# Patient Record
Sex: FEMALE | Race: WHITE | NOT HISPANIC OR LATINO | Employment: FULL TIME | URBAN - METROPOLITAN AREA
[De-identification: names, ages, dates, MRNs, and addresses within clinical notes are randomized per-mention and may not be internally consistent; named-entity substitution may affect disease eponyms.]

---

## 2017-01-24 RX ORDER — OSELTAMIVIR PHOSPHATE 75 MG/1
CAPSULE ORAL
Qty: 10 CAPSULE | Refills: 0 | Status: SHIPPED | OUTPATIENT
Start: 2017-01-24 | End: 2017-04-07

## 2017-01-24 NOTE — PROGRESS NOTES
Patient's here with her daughter  Her daughter has temperature 101 today, positive influenza A  Patient with long history of tobacco abuse worried she may have COPD?  Prophylaxis Tamiflu 75 mg daily ×10 days  If fever chest pain shortness of breath seek urgent treatment    She'll return office soon to discuss chronic dyspnea    Thank You,      James Epley,  NP

## 2017-04-07 ENCOUNTER — OFFICE VISIT (OUTPATIENT)
Dept: FAMILY MEDICINE CLINIC | Facility: CLINIC | Age: 57
End: 2017-04-07

## 2017-04-07 VITALS
HEIGHT: 67 IN | BODY MASS INDEX: 29.98 KG/M2 | OXYGEN SATURATION: 94 % | SYSTOLIC BLOOD PRESSURE: 140 MMHG | HEART RATE: 102 BPM | DIASTOLIC BLOOD PRESSURE: 78 MMHG | WEIGHT: 191 LBS

## 2017-04-07 DIAGNOSIS — R03.0 ELEVATED BLOOD PRESSURE (NOT HYPERTENSION): ICD-10-CM

## 2017-04-07 DIAGNOSIS — F41.9 ANXIETY: ICD-10-CM

## 2017-04-07 DIAGNOSIS — Z13.9 SCREENING: ICD-10-CM

## 2017-04-07 DIAGNOSIS — Z12.11 COLON CANCER SCREENING: ICD-10-CM

## 2017-04-07 DIAGNOSIS — E55.9 VITAMIN D DEFICIENCY: ICD-10-CM

## 2017-04-07 DIAGNOSIS — F41.8 MIXED ANXIETY DEPRESSIVE DISORDER: Primary | ICD-10-CM

## 2017-04-07 PROBLEM — E78.5 HYPERLIPIDEMIA: Status: ACTIVE | Noted: 2017-04-07

## 2017-04-07 PROCEDURE — 99213 OFFICE O/P EST LOW 20 MIN: CPT | Performed by: NURSE PRACTITIONER

## 2017-04-07 RX ORDER — ALPRAZOLAM 1 MG/1
TABLET ORAL
Qty: 30 TABLET | Refills: 0 | Status: SHIPPED | OUTPATIENT
Start: 2017-04-07 | End: 2017-06-26 | Stop reason: SDUPTHER

## 2017-04-07 RX ORDER — DULOXETIN HYDROCHLORIDE 60 MG/1
60 CAPSULE, DELAYED RELEASE ORAL 2 TIMES DAILY
Qty: 180 CAPSULE | Refills: 1 | Status: SHIPPED | OUTPATIENT
Start: 2017-04-07 | End: 2017-09-22 | Stop reason: SDUPTHER

## 2017-04-07 RX ORDER — MIRTAZAPINE 15 MG/1
15 TABLET, FILM COATED ORAL NIGHTLY
Qty: 90 TABLET | Refills: 1 | Status: SHIPPED | OUTPATIENT
Start: 2017-04-07 | End: 2017-09-12 | Stop reason: SDUPTHER

## 2017-04-07 NOTE — PATIENT INSTRUCTIONS
Discharge instructions    Recheck blood pressure weekly ×3 weeks  Call me the numbers    Consider trial decreasing generic Remeron one half tablet at bedtime    Continue appropriate use as needed Xanax with caution    Recheck in 6 months        Return office fasting labs    Recommend a statin, will await your cholesterol first    Thank You,      James Epley,  NP

## 2017-04-07 NOTE — PROGRESS NOTES
Subjective   Ro Perez is a 57 y.o. female.     HPI Comments: Follow-up  Depression    Stable  Cymbalta helps could be better but wants to continue Cymbalta helps her arthritis hips greatly    Hip arthritis controlled doesn't need anything else duloxetine very effective    Remeron has taken for years  Help sleep  She does get up and eat at night  I did discuss risk benefit Remeron  May increase appetite  Discussed discontinuing it slowly  She's been out for a week with poor sleep  Discussed dependency as well as sleep evaluation treatment options    Does not want colonoscopy I discussed risk and benefit  She will reconsider I referred her    Quite a bit of stress with her daughter who has depression and behavior problems  Otherwise doing well    Rarely takes Xanax  The last prescription lasted her 12 months  She is compliant  Appropriate use  Requested updated controlled substance agreement and Raghav  UDS appropriate  No need for confirmation as she rarely takes it would be impossible to obtain accurate results      Mammogram up-to-date    DEXA scan 2015 normal    Blood pressure 140/78 she'll recheck    10 year cardiovascular risk over 10% we discussed as well as statin       The following portions of the patient's history were reviewed and updated as appropriate: allergies, current medications, past family history, past medical history, past surgical history and problem list.    Review of Systems   Musculoskeletal: Positive for arthralgias.   Psychiatric/Behavioral: The patient is nervous/anxious.    All other systems reviewed and are negative.      Objective   Physical Exam   Constitutional: She is oriented to person, place, and time. She appears well-developed and well-nourished.   HENT:   Head: Normocephalic.   Nose: Nose normal.   Mouth/Throat: Oropharynx is clear and moist.   Tm clear laci no mass canal patent without d/c   Eyes: Conjunctivae are normal. Pupils are equal, round, and reactive to light. No  scleral icterus.   Neck: Neck supple. No JVD present. No thyromegaly present.   Cardiovascular: Normal rate, regular rhythm and normal heart sounds.  Exam reveals no gallop and no friction rub.    No murmur heard.  Pulmonary/Chest: Effort normal and breath sounds normal. No stridor. No respiratory distress. She has no wheezes. She has no rales.   Abdominal: Soft. Bowel sounds are normal. She exhibits no distension. There is no tenderness.   No hepatosplenomegaly, no ascites,   Musculoskeletal: She exhibits no edema or tenderness.   Lymphadenopathy:     She has no cervical adenopathy.   Neurological: She is alert and oriented to person, place, and time. She has normal reflexes.   Skin: Skin is warm and dry. No rash noted. No erythema.   Psychiatric: She has a normal mood and affect. Her behavior is normal. Judgment and thought content normal.   Vitals reviewed.      Assessment/Plan   Ro was seen today for follow-up and med refill.    Diagnoses and all orders for this visit:    Mixed anxiety depressive disorder  -     Comprehensive Metabolic Panel  -     CBC & Differential  -     Lipid Panel With LDL / HDL Ratio  -     TSH Rfx On Abnormal To Free T4  -     Vitamin D 25 Hydroxy  -     Urinalysis With / Microscopic If Indicated  -     Occult Blood, Fecal By Immunoassay    Elevated blood pressure (not hypertension)  -     Comprehensive Metabolic Panel  -     CBC & Differential  -     Lipid Panel With LDL / HDL Ratio  -     TSH Rfx On Abnormal To Free T4  -     Vitamin D 25 Hydroxy  -     Urinalysis With / Microscopic If Indicated  -     Occult Blood, Fecal By Immunoassay    Vitamin D deficiency  -     Comprehensive Metabolic Panel  -     CBC & Differential  -     Lipid Panel With LDL / HDL Ratio  -     TSH Rfx On Abnormal To Free T4  -     Vitamin D 25 Hydroxy  -     Urinalysis With / Microscopic If Indicated  -     Occult Blood, Fecal By Immunoassay    Screening  -     Comprehensive Metabolic Panel  -     CBC &  Differential  -     Lipid Panel With LDL / HDL Ratio  -     TSH Rfx On Abnormal To Free T4  -     Vitamin D 25 Hydroxy  -     Urinalysis With / Microscopic If Indicated  -     Occult Blood, Fecal By Immunoassay  -     Vascular Screening (Bundle) CAR    Anxiety  -     Comprehensive Metabolic Panel  -     CBC & Differential  -     Lipid Panel With LDL / HDL Ratio  -     TSH Rfx On Abnormal To Free T4  -     Vitamin D 25 Hydroxy  -     Urinalysis With / Microscopic If Indicated  -     Occult Blood, Fecal By Immunoassay    Colon cancer screening  -     Comprehensive Metabolic Panel  -     CBC & Differential  -     Lipid Panel With LDL / HDL Ratio  -     TSH Rfx On Abnormal To Free T4  -     Vitamin D 25 Hydroxy  -     Urinalysis With / Microscopic If Indicated  -     Occult Blood, Fecal By Immunoassay  -     Ambulatory Referral For Screening Colonoscopy    Other orders  -     ALPRAZolam (XANAX) 1 MG tablet; One half to one tablet daily as needed for anxiety  -     DULoxetine (CYMBALTA) 60 MG capsule; Take 1 capsule by mouth 2 (Two) Times a Day.  -     mirtazapine (REMERON) 15 MG tablet; Take 1 tablet by mouth Every Night.                  Discussed Remeron  Suggest trying to decrease one half tablet for couple months and possibly wean off    Proper use Xanax  Recheck 6 months  Fasting labs  Statin will be recommended discussed risk benefit as well as cardiovascular risk greater than 10% 10 year estimated  Must stop smoking    Recommend vascular screenings    Discussed aspirin next visit as well    Recommend more frequent follow-ups

## 2017-06-20 ENCOUNTER — TRANSCRIBE ORDERS (OUTPATIENT)
Dept: ADMINISTRATIVE | Facility: HOSPITAL | Age: 57
End: 2017-06-20

## 2017-06-20 DIAGNOSIS — Z12.31 VISIT FOR SCREENING MAMMOGRAM: Primary | ICD-10-CM

## 2017-06-29 ENCOUNTER — TELEPHONE (OUTPATIENT)
Dept: FAMILY MEDICINE CLINIC | Facility: CLINIC | Age: 57
End: 2017-06-29

## 2017-06-29 NOTE — TELEPHONE ENCOUNTER
----- Message from Mary Lou Myers MA sent at 6/29/2017  3:56 PM EDT -----  I did not refill this patient's medication, i looked through her chart some more and saw that it took 2 month rather than 12 months on her last prescription. But i will refill now that you have given me the ok

## 2017-06-29 NOTE — TELEPHONE ENCOUNTER
Okay to refill   Please make sure controlled substance agreement in her chart  Please updated Raghav    As well as Xanax is a controlled substance always will need approval from  me prior  Even if she is appropriate.   Is a little confusing  I don't believe these are included on our  Protocol refill  We can clarify when I come back from a trip    Thanks!

## 2017-06-29 NOTE — TELEPHONE ENCOUNTER
----- Message from Mary Lou Myers MA sent at 6/26/2017  2:40 PM EDT -----  A request for refill on patient's XANAX and i read through last office note and it stated no need for confirmation for refills on this medication due to the patient not using it regularly. Sending in refill today 6/26/2017 # 30 no refills

## 2017-06-30 RX ORDER — ALPRAZOLAM 1 MG/1
TABLET ORAL
Qty: 30 TABLET | Refills: 0 | OUTPATIENT
Start: 2017-06-30 | End: 2017-09-15 | Stop reason: SDUPTHER

## 2017-07-14 ENCOUNTER — HOSPITAL ENCOUNTER (OUTPATIENT)
Dept: CARDIOLOGY | Facility: HOSPITAL | Age: 57
Discharge: HOME | End: 2017-07-14

## 2017-07-14 ENCOUNTER — HOSPITAL ENCOUNTER (OUTPATIENT)
Dept: MAMMOGRAPHY | Facility: HOSPITAL | Age: 57
Discharge: HOME | End: 2017-07-14
Admitting: NURSE PRACTITIONER

## 2017-07-14 VITALS
HEART RATE: 90 BPM | BODY MASS INDEX: 31.23 KG/M2 | SYSTOLIC BLOOD PRESSURE: 146 MMHG | DIASTOLIC BLOOD PRESSURE: 78 MMHG | HEIGHT: 67 IN | WEIGHT: 199 LBS

## 2017-07-14 DIAGNOSIS — Z12.31 VISIT FOR SCREENING MAMMOGRAM: ICD-10-CM

## 2017-07-14 LAB
BH CV ECHO MEAS - DIST AO DIAM: 1.58 CM
BH CV VAS BP LEFT ARM: NORMAL MMHG
BH CV VAS BP RIGHT ARM: NORMAL MMHG
BH CV XLRA MEAS - MID AO DIAM: 1.81 CM
BH CV XLRA MEAS - PAD LEFT ABI DP: 1.02
BH CV XLRA MEAS - PAD LEFT ABI PT: 1.06
BH CV XLRA MEAS - PAD LEFT ARM: 145 MMHG
BH CV XLRA MEAS - PAD LEFT LEG DP: 150 MMHG
BH CV XLRA MEAS - PAD LEFT LEG PT: 156 MMHG
BH CV XLRA MEAS - PAD RIGHT ABI DP: 1.01
BH CV XLRA MEAS - PAD RIGHT ABI PT: 1.02
BH CV XLRA MEAS - PAD RIGHT ARM: 146 MMHG
BH CV XLRA MEAS - PAD RIGHT LEG DP: 148 MMHG
BH CV XLRA MEAS - PAD RIGHT LEG PT: 150 MMHG
BH CV XLRA MEAS - PROX AO DIAM: 1.85 CM
BH CV XLRA MEAS LEFT ICA/CCA RATIO: 1.42
BH CV XLRA MEAS LEFT MID CCA PSV: NORMAL CM/SEC
BH CV XLRA MEAS LEFT MID ICA PSV: NORMAL CM/SEC
BH CV XLRA MEAS LEFT PROX ECA PSV: NORMAL CM/SEC
BH CV XLRA MEAS RIGHT ICA/CCA RATIO: 0.96
BH CV XLRA MEAS RIGHT MID CCA PSV: NORMAL CM/SEC
BH CV XLRA MEAS RIGHT MID ICA PSV: NORMAL CM/SEC
BH CV XLRA MEAS RIGHT PROX ECA PSV: NORMAL CM/SEC

## 2017-07-14 PROCEDURE — 93799 UNLISTED CV SVC/PROCEDURE: CPT

## 2017-07-14 PROCEDURE — G0202 SCR MAMMO BI INCL CAD: HCPCS

## 2017-09-14 RX ORDER — MIRTAZAPINE 15 MG/1
TABLET, FILM COATED ORAL
Qty: 90 TABLET | Refills: 0 | Status: SHIPPED | OUTPATIENT
Start: 2017-09-14 | End: 2017-11-26 | Stop reason: SDUPTHER

## 2017-09-21 RX ORDER — ALPRAZOLAM 1 MG/1
TABLET ORAL
Qty: 30 TABLET | Refills: 0 | OUTPATIENT
Start: 2017-09-21 | End: 2018-04-18

## 2017-09-22 RX ORDER — DULOXETIN HYDROCHLORIDE 60 MG/1
CAPSULE, DELAYED RELEASE ORAL
Qty: 180 CAPSULE | Refills: 1 | Status: SHIPPED | OUTPATIENT
Start: 2017-09-22 | End: 2018-04-08 | Stop reason: SDUPTHER

## 2017-11-27 RX ORDER — MIRTAZAPINE 15 MG/1
TABLET, FILM COATED ORAL
Qty: 90 TABLET | Refills: 0 | Status: SHIPPED | OUTPATIENT
Start: 2017-11-27 | End: 2018-02-19 | Stop reason: SDUPTHER

## 2018-02-19 RX ORDER — MIRTAZAPINE 15 MG/1
TABLET, FILM COATED ORAL
Qty: 90 TABLET | Refills: 0 | Status: SHIPPED | OUTPATIENT
Start: 2018-02-19 | End: 2018-04-18 | Stop reason: SDUPTHER

## 2018-04-09 RX ORDER — DULOXETIN HYDROCHLORIDE 60 MG/1
CAPSULE, DELAYED RELEASE ORAL
Qty: 180 CAPSULE | Refills: 1 | Status: SHIPPED | OUTPATIENT
Start: 2018-04-09 | End: 2018-04-18 | Stop reason: SDUPTHER

## 2018-04-18 ENCOUNTER — OFFICE VISIT (OUTPATIENT)
Dept: FAMILY MEDICINE CLINIC | Facility: CLINIC | Age: 58
End: 2018-04-18

## 2018-04-18 ENCOUNTER — APPOINTMENT (OUTPATIENT)
Dept: LAB | Facility: HOSPITAL | Age: 58
End: 2018-04-18

## 2018-04-18 ENCOUNTER — HOSPITAL ENCOUNTER (OUTPATIENT)
Dept: GENERAL RADIOLOGY | Facility: HOSPITAL | Age: 58
Discharge: HOME | End: 2018-04-18
Admitting: NURSE PRACTITIONER

## 2018-04-18 VITALS
HEART RATE: 111 BPM | TEMPERATURE: 98.2 F | SYSTOLIC BLOOD PRESSURE: 142 MMHG | WEIGHT: 202.1 LBS | DIASTOLIC BLOOD PRESSURE: 74 MMHG | BODY MASS INDEX: 32.13 KG/M2 | OXYGEN SATURATION: 93 %

## 2018-04-18 DIAGNOSIS — M54.50 CHRONIC LEFT-SIDED LOW BACK PAIN WITHOUT SCIATICA: ICD-10-CM

## 2018-04-18 DIAGNOSIS — M16.12 PRIMARY OSTEOARTHRITIS OF LEFT HIP: ICD-10-CM

## 2018-04-18 DIAGNOSIS — G89.29 CHRONIC LEFT-SIDED LOW BACK PAIN WITHOUT SCIATICA: ICD-10-CM

## 2018-04-18 DIAGNOSIS — Z79.899 HIGH RISK MEDICATION USE: ICD-10-CM

## 2018-04-18 DIAGNOSIS — R06.09 DYSPNEA ON EXERTION: ICD-10-CM

## 2018-04-18 DIAGNOSIS — F41.8 MIXED ANXIETY DEPRESSIVE DISORDER: Primary | ICD-10-CM

## 2018-04-18 LAB
ALBUMIN SERPL-MCNC: 4.4 G/DL (ref 3.5–5.2)
ALBUMIN/GLOB SERPL: 1.4 G/DL
ALP SERPL-CCNC: 118 U/L (ref 39–117)
ALT SERPL W P-5'-P-CCNC: 17 U/L (ref 1–33)
ANION GAP SERPL CALCULATED.3IONS-SCNC: 13.3 MMOL/L
AST SERPL-CCNC: 17 U/L (ref 1–32)
BASOPHILS # BLD AUTO: 0.09 10*3/MM3 (ref 0–0.2)
BASOPHILS NFR BLD AUTO: 0.8 % (ref 0–1.5)
BILIRUB SERPL-MCNC: 0.2 MG/DL (ref 0.1–1.2)
BUN BLD-MCNC: 11 MG/DL (ref 6–20)
BUN/CREAT SERPL: 13.1 (ref 7–25)
CALCIUM SPEC-SCNC: 9.4 MG/DL (ref 8.6–10.5)
CHLORIDE SERPL-SCNC: 101 MMOL/L (ref 98–107)
CO2 SERPL-SCNC: 26.7 MMOL/L (ref 22–29)
CREAT BLD-MCNC: 0.84 MG/DL (ref 0.57–1)
DEPRECATED RDW RBC AUTO: 45.7 FL (ref 37–54)
EOSINOPHIL # BLD AUTO: 0.15 10*3/MM3 (ref 0–0.7)
EOSINOPHIL NFR BLD AUTO: 1.3 % (ref 0.3–6.2)
ERYTHROCYTE [DISTWIDTH] IN BLOOD BY AUTOMATED COUNT: 13.8 % (ref 11.7–13)
GFR SERPL CREATININE-BSD FRML MDRD: 69 ML/MIN/1.73
GLOBULIN UR ELPH-MCNC: 3.2 GM/DL
GLUCOSE BLD-MCNC: 78 MG/DL (ref 65–99)
HCT VFR BLD AUTO: 44.7 % (ref 35.6–45.5)
HGB BLD-MCNC: 14.5 G/DL (ref 11.9–15.5)
IMM GRANULOCYTES # BLD: 0.06 10*3/MM3 (ref 0–0.03)
IMM GRANULOCYTES NFR BLD: 0.5 % (ref 0–0.5)
LYMPHOCYTES # BLD AUTO: 2.78 10*3/MM3 (ref 0.9–4.8)
LYMPHOCYTES NFR BLD AUTO: 24.8 % (ref 19.6–45.3)
MCH RBC QN AUTO: 29.3 PG (ref 26.9–32)
MCHC RBC AUTO-ENTMCNC: 32.4 G/DL (ref 32.4–36.3)
MCV RBC AUTO: 90.3 FL (ref 80.5–98.2)
MONOCYTES # BLD AUTO: 0.72 10*3/MM3 (ref 0.2–1.2)
MONOCYTES NFR BLD AUTO: 6.4 % (ref 5–12)
NEUTROPHILS # BLD AUTO: 7.42 10*3/MM3 (ref 1.9–8.1)
NEUTROPHILS NFR BLD AUTO: 66.2 % (ref 42.7–76)
NRBC BLD MANUAL-RTO: 0 /100 WBC (ref 0–0)
PLATELET # BLD AUTO: 281 10*3/MM3 (ref 140–500)
PMV BLD AUTO: 10.8 FL (ref 6–12)
POTASSIUM BLD-SCNC: 4.2 MMOL/L (ref 3.5–5.2)
PROT SERPL-MCNC: 7.6 G/DL (ref 6–8.5)
RBC # BLD AUTO: 4.95 10*6/MM3 (ref 3.9–5.2)
SODIUM BLD-SCNC: 141 MMOL/L (ref 136–145)
TSH SERPL DL<=0.05 MIU/L-ACNC: 2.5 MIU/ML (ref 0.27–4.2)
WBC NRBC COR # BLD: 11.22 10*3/MM3 (ref 4.5–10.7)

## 2018-04-18 PROCEDURE — 99213 OFFICE O/P EST LOW 20 MIN: CPT | Performed by: NURSE PRACTITIONER

## 2018-04-18 PROCEDURE — 72100 X-RAY EXAM L-S SPINE 2/3 VWS: CPT

## 2018-04-18 PROCEDURE — 85025 COMPLETE CBC W/AUTO DIFF WBC: CPT | Performed by: NURSE PRACTITIONER

## 2018-04-18 PROCEDURE — 36415 COLL VENOUS BLD VENIPUNCTURE: CPT | Performed by: NURSE PRACTITIONER

## 2018-04-18 PROCEDURE — 84443 ASSAY THYROID STIM HORMONE: CPT | Performed by: NURSE PRACTITIONER

## 2018-04-18 PROCEDURE — 80053 COMPREHEN METABOLIC PANEL: CPT | Performed by: NURSE PRACTITIONER

## 2018-04-18 RX ORDER — METHYLPREDNISOLONE 4 MG/1
TABLET ORAL
Qty: 21 TABLET | Refills: 0 | Status: SHIPPED | OUTPATIENT
Start: 2018-04-18 | End: 2019-05-22

## 2018-04-18 RX ORDER — ALPRAZOLAM 0.5 MG/1
TABLET ORAL
Qty: 30 TABLET | Refills: 0 | Status: SHIPPED | OUTPATIENT
Start: 2018-04-18 | End: 2019-05-28 | Stop reason: SDUPTHER

## 2018-04-18 RX ORDER — DULOXETIN HYDROCHLORIDE 60 MG/1
60 CAPSULE, DELAYED RELEASE ORAL 2 TIMES DAILY
Qty: 180 CAPSULE | Refills: 1 | Status: SHIPPED | OUTPATIENT
Start: 2018-04-18 | End: 2019-05-28 | Stop reason: SDUPTHER

## 2018-04-18 RX ORDER — MIRTAZAPINE 15 MG/1
15 TABLET, FILM COATED ORAL NIGHTLY
Qty: 90 TABLET | Refills: 1 | Status: SHIPPED | OUTPATIENT
Start: 2018-04-18 | End: 2018-10-19 | Stop reason: SDUPTHER

## 2018-04-18 NOTE — PROGRESS NOTES
Subjective   Ro Perez is a 58 y.o. female.     F/u doing well  chol  Daughter feeling better    Chronic anxiety depression more anxiety than depression  Occasional increased anxiety quite uncomfortable rarely takes low-dose Xanax  We discuss Xanax patient rarely takes she is compliant with discussed and reviewed again today risk and benefit        1 prescription last patient over 6 months  She is already taking duloxetine as well as Remeron she is appropriate  And this helps quite a bit anxiety depression as well as chronic pain    We discussed an accident decided to decrease the dose to 0.5 mg to minimize risk  Patient is agreeable and she understands I will not refill this over the next 6 months as well as conditions are prescribing circumstances could change at any time    Remeron at nighttime she does not want to change any medications thinks this is a good combination    He's had quite a bit of problems with her daughter with depression anxiety  She is doing better  Single mom  No drug or alcohol abuse or history requested Raghav reviewed appropriate  Controlled substance agreement on hand  UDS appropriate previously    Chronic hip pain  But actually she showed me her left lower back  Does not radiate no bowel or bladder changes had this for quite a few years  But she does not have a lateral hip or groin pain  She feels much better when taking duloxetine much less stiffness  Sits long hours at her job and works on weekends at [employer]  Chronic back pain improved with her medication but still has daily pain      Patient reschedule colonoscopy  Follow-up GYN    Decreased calories increased vegetables  Highly recommend a standing desk                Hyperlipidemia          The following portions of the patient's history were reviewed and updated as appropriate: allergies, current medications, past medical history, past social history, past surgical history and problem list.    Review of Systems   Musculoskeletal:  Positive for back pain.   Psychiatric/Behavioral: The patient is nervous/anxious.        Objective   Physical Exam   Constitutional: She is oriented to person, place, and time. She appears well-developed and well-nourished.   HENT:   Head: Normocephalic.   Mouth/Throat: Oropharynx is clear and moist.   Eyes: Conjunctivae are normal. Pupils are equal, round, and reactive to light.   Neck: Neck supple.   Cardiovascular: Normal rate, regular rhythm and normal heart sounds.  Exam reveals no friction rub.    No murmur heard.  Pulmonary/Chest: Effort normal and breath sounds normal. No respiratory distress. She has no wheezes.   Musculoskeletal: She exhibits tenderness. She exhibits no edema.   Left low back tenderness  Left sacral  Otherwise no lumbar sacral tenderness  Negative straight leg raise plantar flexion dorsal flexion normal    Left hip normal     Neurological: She is alert and oriented to person, place, and time.   Skin: Skin is warm and dry.   Psychiatric: She has a normal mood and affect. Her behavior is normal. Judgment and thought content normal.   Vitals reviewed.        Assessment/Plan   Ro was seen today for hyperlipidemia.    Diagnoses and all orders for this visit:    Mixed anxiety depressive disorder  -     TSH Rfx On Abnormal To Free T4  -     Comprehensive Metabolic Panel  -     CBC & Differential  -     CBC Auto Differential    Primary osteoarthritis of left hip  -     TSH Rfx On Abnormal To Free T4  -     Comprehensive Metabolic Panel  -     CBC & Differential  -     CBC Auto Differential    Dyspnea on exertion  -     TSH Rfx On Abnormal To Free T4  -     Comprehensive Metabolic Panel  -     CBC & Differential  -     CBC Auto Differential    Chronic left-sided low back pain without sciatica  -     TSH Rfx On Abnormal To Free T4  -     Comprehensive Metabolic Panel  -     CBC & Differential  -     XR Spine Lumbar 2 or 3 View  -     CBC Auto Differential    High risk medication use  -     TSH Rfx  On Abnormal To Free T4  -     Comprehensive Metabolic Panel  -     CBC & Differential  -     CBC Auto Differential    Other orders  -     DULoxetine (CYMBALTA) 60 MG capsule; Take 1 capsule by mouth 2 (Two) Times a Day.  -     mirtazapine (REMERON) 15 MG tablet; Take 1 tablet by mouth Every Night.  -     ALPRAZolam (XANAX) 0.5 MG tablet; One half to one tablet daily as needed for anxiety, caution sedation do not drive.  -     MethylPREDNISolone (MEDROL, MARY,) 4 MG tablet; follow package directions                  Decreased Xanax from 1 mg to 0.5 mg  Risk-benefit try to minimize use  50% reduction  Rare use only  Do not drive caution sedation  Storage disposal  Back exercises daily  Provider supervised 20 minutes at least 5 days a week  Stretching before and after work  Recommend standing desk to alternate  Patient doesn't think for will go for this  Offered to write Rx  Otherwise patient consider generic desk  To move up and down    She does take Aleve 2-3 per day  Lowest effective dose shortest time possible  Reschedule colonoscopy  Follow-up GYN she says she's up-to-date mammogram    Follow-up 6 weeks  She declines physical therapy but see above    MRI of improving    Medrol Dosepak one standby I wanted this available to her if she needs it  Discussed appropriate use    Office visit 30 minutes

## 2018-04-18 NOTE — PATIENT INSTRUCTIONS
Back Pain, Adult  Back pain is very common. The pain often gets better over time. The cause of back pain is usually not dangerous. Most people can learn to manage their back pain on their own.  Follow these instructions at home:  Watch your back pain for any changes. The following actions may help to lessen any pain you are feeling:  · Stay active. Start with short walks on flat ground if you can. Try to walk farther each day.  · Exercise regularly as told by your doctor. Exercise helps your back heal faster. It also helps avoid future injury by keeping your muscles strong and flexible.  · Do not sit, drive, or  one place for more than 30 minutes.  · Do not stay in bed. Resting more than 1-2 days can slow down your recovery.  · Be careful when you bend or lift an object. Use good form when lifting:  ¨ Bend at your knees.  ¨ Keep the object close to your body.  ¨ Do not twist.  · Sleep on a firm mattress. Lie on your side, and bend your knees. If you lie on your back, put a pillow under your knees.  · Take medicines only as told by your doctor.  · Put ice on the injured area.  ¨ Put ice in a plastic bag.  ¨ Place a towel between your skin and the bag.  ¨ Leave the ice on for 20 minutes, 2-3 times a day for the first 2-3 days. After that, you can switch between ice and heat packs.  · Avoid feeling anxious or stressed. Find good ways to deal with stress, such as exercise.  · Maintain a healthy weight. Extra weight puts stress on your back.  Contact a doctor if:  · You have pain that does not go away with rest or medicine.  · You have worsening pain that goes down into your legs or buttocks.  · You have pain that does not get better in one week.  · You have pain at night.  · You lose weight.  · You have a fever or chills.  Get help right away if:  · You cannot control when you poop (bowel movement) or pee (urinate).  · Your arms or legs feel weak.  · Your arms or legs lose feeling (numbness).  · You feel sick to  your stomach (nauseous) or throw up (vomit).  · You have belly (abdominal) pain.  · You feel like you may pass out (faint).  This information is not intended to replace advice given to you by your health care provider. Make sure you discuss any questions you have with your health care provider.  Document Released: 2009 Document Revised: 2017 Document Reviewed: 2015  [email or web address] Interactive Patient Education © 2017 [employer]    Back Exercises  If you have pain in your back, do these exercises 2-3 times each day or as told by your doctor. When the pain goes away, do the exercises once each day, but repeat the steps more times for each exercise (do more repetitions). If you do not have pain in your back, do these exercises once each day or as told by your doctor.  Exercises  Single Knee to Chest     Do these steps 3-5 times in a row for each le. Lie on your back on a firm bed or the floor with your legs stretched out.  2. Bring one knee to your chest.  3. Hold your knee to your chest by grabbing your knee or thigh.  4. Pull on your knee until you feel a gentle stretch in your lower back.  5. Keep doing the stretch for 10-30 seconds.  6. Slowly let go of your leg and straighten it.  Pelvic Tilt     Do these steps 5-10 times in a row:  1. Lie on your back on a firm bed or the floor with your legs stretched out.  2. Bend your knees so they point up to the ceiling. Your feet should be flat on the floor.  3. Tighten your lower belly (abdomen) muscles to press your lower back against the floor. This will make your tailbone point up to the ceiling instead of pointing down to your feet or the floor.  4. Stay in this position for 5-10 seconds while you gently tighten your muscles and breathe evenly.  Cat-Cow     Do these steps until your lower back bends more easily:  1. Get on your hands and knees on a firm surface. Keep your hands under your shoulders, and keep your knees under your hips. You may put  padding under your knees.  2. Let your head hang down, and make your tailbone point down to the floor so your lower back is round like the back of a cat.  3. Stay in this position for 5 seconds.  4. Slowly lift your head and make your tailbone point up to the ceiling so your back hangs low (sags) like the back of a cow.  5. Stay in this position for 5 seconds.  Press-Ups     Do these steps 5-10 times in a row:  1. Lie on your belly (face-down) on the floor.  2. Place your hands near your head, about shoulder-width apart.  3. While you keep your back relaxed and keep your hips on the floor, slowly straighten your arms to raise the top half of your body and lift your shoulders. Do not use your back muscles. To make yourself more comfortable, you may change where you place your hands.  4. Stay in this position for 5 seconds.  5. Slowly return to lying flat on the floor.  Bridges     Do these steps 10 times in a row:  1. Lie on your back on a firm surface.  2. Bend your knees so they point up to the ceiling. Your feet should be flat on the floor.  3. Tighten your butt muscles and lift your butt off of the floor until your waist is almost as high as your knees. If you do not feel the muscles working in your butt and the back of your thighs, slide your feet 1-2 inches farther away from your butt.  4. Stay in this position for 3-5 seconds.  5. Slowly lower your butt to the floor, and let your butt muscles relax.  If this exercise is too easy, try doing it with your arms crossed over your chest.  Belly Crunches     Do these steps 5-10 times in a row:  1. Lie on your back on a firm bed or the floor with your legs stretched out.  2. Bend your knees so they point up to the ceiling. Your feet should be flat on the floor.  3. Cross your arms over your chest.  4. Tip your chin a little bit toward your chest but do not bend your neck.  5. Tighten your belly muscles and slowly raise your chest just enough to lift your shoulder  blades a tiny bit off of the floor.  6. Slowly lower your chest and your head to the floor.  Back Lifts   Do these steps 5-10 times in a row:  1. Lie on your belly (face-down) with your arms at your sides, and rest your forehead on the floor.  2. Tighten the muscles in your legs and your butt.  3. Slowly lift your chest off of the floor while you keep your hips on the floor. Keep the back of your head in line with the curve in your back. Look at the floor while you do this.  4. Stay in this position for 3-5 seconds.  5. Slowly lower your chest and your face to the floor.  Contact a doctor if:  · Your back pain gets a lot worse when you do an exercise.  · Your back pain does not lessen 2 hours after you exercise.  If you have any of these problems, stop doing the exercises. Do not do them again unless your doctor says it is okay.  Get help right away if:  · You have sudden, very bad back pain. If this happens, stop doing the exercises. Do not do them again unless your doctor says it is okay.  This information is not intended to replace advice given to you by your health care provider. Make sure you discuss any questions you have with your health care provider.  Document Released: 01/20/2012 Document Revised: 05/25/2017 Document Reviewed: 02/11/2016  [employer] Interactive Patient Education © 2017 [employer]

## 2018-04-24 ENCOUNTER — TELEPHONE (OUTPATIENT)
Dept: FAMILY MEDICINE CLINIC | Facility: CLINIC | Age: 58
End: 2018-04-24

## 2018-04-24 NOTE — TELEPHONE ENCOUNTER
----- Message from James Epley, APRN sent at 4/24/2018 10:15 AM EDT -----  Please tell patient her x-ray shows some mild scoliosis  Also has severe disc space narrowing L1-L2 and L2-L3  And degenerative changes in her spine    Follow instructions given the office

## 2018-05-07 ENCOUNTER — TELEPHONE (OUTPATIENT)
Dept: FAMILY MEDICINE CLINIC | Facility: CLINIC | Age: 58
End: 2018-05-07

## 2018-05-07 NOTE — TELEPHONE ENCOUNTER
----- Message from James Epley, APRN sent at 5/2/2018  5:40 PM EDT -----  Please call patient  CBC borderline but normal  Not worrisome  White count borderline high 11.2 to  Just should repeat this in 6 months  Liver kidney function normal  Thyroid normal

## 2018-05-31 ENCOUNTER — TRANSCRIBE ORDERS (OUTPATIENT)
Dept: ADMINISTRATIVE | Facility: HOSPITAL | Age: 58
End: 2018-05-31

## 2018-05-31 DIAGNOSIS — Z12.31 VISIT FOR SCREENING MAMMOGRAM: Primary | ICD-10-CM

## 2018-07-18 ENCOUNTER — HOSPITAL ENCOUNTER (OUTPATIENT)
Dept: MAMMOGRAPHY | Facility: HOSPITAL | Age: 58
Discharge: HOME | End: 2018-07-18
Admitting: NURSE PRACTITIONER

## 2018-07-18 DIAGNOSIS — Z12.31 VISIT FOR SCREENING MAMMOGRAM: ICD-10-CM

## 2018-07-18 PROCEDURE — 77067 SCR MAMMO BI INCL CAD: CPT

## 2018-08-07 RX ORDER — ALPRAZOLAM 0.5 MG/1
TABLET ORAL
Qty: 30 TABLET | Refills: 0 | OUTPATIENT
Start: 2018-08-07

## 2018-08-07 NOTE — TELEPHONE ENCOUNTER
Please tell patient  To soon to refill as we discussed    If her anxiety is uncontrolled we can consider as needed BuSpar  Let me know her situation  Thank you

## 2018-10-19 RX ORDER — MIRTAZAPINE 15 MG/1
15 TABLET, FILM COATED ORAL NIGHTLY
Qty: 90 TABLET | Refills: 0 | Status: SHIPPED | OUTPATIENT
Start: 2018-10-19 | End: 2019-05-28 | Stop reason: SDUPTHER

## 2019-05-22 ENCOUNTER — OFFICE VISIT (OUTPATIENT)
Dept: FAMILY MEDICINE CLINIC | Facility: CLINIC | Age: 59
End: 2019-05-22

## 2019-05-22 VITALS
OXYGEN SATURATION: 95 % | WEIGHT: 189 LBS | HEIGHT: 67 IN | DIASTOLIC BLOOD PRESSURE: 86 MMHG | HEART RATE: 89 BPM | BODY MASS INDEX: 29.66 KG/M2 | SYSTOLIC BLOOD PRESSURE: 140 MMHG

## 2019-05-22 DIAGNOSIS — G89.29 CHRONIC RIGHT SHOULDER PAIN: ICD-10-CM

## 2019-05-22 DIAGNOSIS — Z12.11 SCREEN FOR COLON CANCER: ICD-10-CM

## 2019-05-22 DIAGNOSIS — F41.8 MIXED ANXIETY DEPRESSIVE DISORDER: ICD-10-CM

## 2019-05-22 DIAGNOSIS — F41.9 ANXIETY: Primary | ICD-10-CM

## 2019-05-22 DIAGNOSIS — M25.511 CHRONIC RIGHT SHOULDER PAIN: ICD-10-CM

## 2019-05-22 DIAGNOSIS — E78.2 MIXED HYPERLIPIDEMIA: ICD-10-CM

## 2019-05-22 PROCEDURE — 99213 OFFICE O/P EST LOW 20 MIN: CPT | Performed by: NURSE PRACTITIONER

## 2019-05-22 NOTE — PROGRESS NOTES
Fell on rt shoulder 18 mos ago pain hs    cscop      duloxatine      Doing well          Quit smoking 2 mos      Needs outpt lasting

## 2019-05-28 RX ORDER — ALPRAZOLAM 0.5 MG/1
TABLET ORAL
Qty: 30 TABLET | Refills: 0 | Status: SHIPPED | OUTPATIENT
Start: 2019-05-28 | End: 2019-05-28 | Stop reason: SDUPTHER

## 2019-05-28 RX ORDER — DULOXETIN HYDROCHLORIDE 60 MG/1
60 CAPSULE, DELAYED RELEASE ORAL 2 TIMES DAILY
Qty: 180 CAPSULE | Refills: 1 | Status: SHIPPED | OUTPATIENT
Start: 2019-05-28 | End: 2019-11-21 | Stop reason: SDUPTHER

## 2019-05-28 RX ORDER — MIRTAZAPINE 15 MG/1
15 TABLET, FILM COATED ORAL NIGHTLY
Qty: 90 TABLET | Refills: 1 | Status: SHIPPED | OUTPATIENT
Start: 2019-05-28 | End: 2019-11-21 | Stop reason: SDUPTHER

## 2019-05-28 RX ORDER — ALPRAZOLAM 0.5 MG/1
TABLET ORAL
Qty: 30 TABLET | Refills: 0 | Status: SHIPPED | OUTPATIENT
Start: 2019-05-28 | End: 2020-05-18 | Stop reason: SDUPTHER

## 2019-05-31 NOTE — PROGRESS NOTES
Subjective   Ro Perez is a 59 y.o. female.       Here for follow-up chronic anxiety takes Cymbalta helps depression controlled  Rarely takes Xanax for breakthrough anxiety  Last prescription Xanax over 1 year ago and takes for acute exacerbation only  Chronic anxiety controlled by Cymbalta  Controlled substance agreement in place requested updated Raghav  Previous urine drug screen appropriate  Reviewed appropriate storage disposal appropriate use cautions      Fell on rt shoulder 18 mos ago pain hs  Increased at night has not had any work-up  Would like to see Ortho  No weakness range of motion with pain    cscop  Needs screening  No abdominal complaints            Doing well          Quit smoking 2 mos      Needs outpt lasting        Anxiety   Symptoms include nervous/anxious behavior. Patient reports no chest pain, confusion, dizziness, palpitations or shortness of breath.            The following portions of the patient's history were reviewed and updated as appropriate: allergies, current medications, past family history, past medical history, past social history, past surgical history and problem list.    Review of Systems   Constitutional: Negative for chills, fatigue, fever and unexpected weight loss.   HENT: Negative.  Negative for trouble swallowing.    Eyes: Negative.    Respiratory: Negative for cough and shortness of breath.    Cardiovascular: Negative for chest pain, palpitations and leg swelling.   Gastrointestinal: Negative for abdominal pain and blood in stool.   Genitourinary: Negative.  Negative for pelvic pain.   Musculoskeletal: Negative.    Skin: Negative.    Neurological: Negative.  Negative for dizziness, speech difficulty, weakness and confusion.   Psychiatric/Behavioral: The patient is nervous/anxious.    All other systems reviewed and are negative.      Objective   Physical Exam   Constitutional: She is oriented to person, place, and time. She appears well-developed and well-nourished.    HENT:   Head: Normocephalic.   Mouth/Throat: Oropharynx is clear and moist. No oropharyngeal exudate.   Eyes: Conjunctivae are normal. Pupils are equal, round, and reactive to light.   Neck: Neck supple. No JVD present.   Cardiovascular: Normal rate, regular rhythm and normal heart sounds. Exam reveals no friction rub.   No murmur heard.  Carotids clear   Pulmonary/Chest: Effort normal and breath sounds normal. No respiratory distress. She has no wheezes.   Abdominal: Soft. Bowel sounds are normal. She exhibits no distension and no mass. There is no tenderness. There is no guarding. No hernia.   Musculoskeletal: She exhibits no edema or tenderness.   Lymphadenopathy:     She has no cervical adenopathy.   Neurological: She is alert and oriented to person, place, and time.   Skin: Skin is warm and dry.   Psychiatric: She has a normal mood and affect. Her behavior is normal. Judgment and thought content normal.   Vitals reviewed.         Assessment/Plan   Ro was seen today for anxiety.    Diagnoses and all orders for this visit:    Chronic right shoulder pain  -     Ambulatory Referral to Orthopedic Surgery    Screen for colon cancer  -     Ambulatory Referral For Screening Colonoscopy    Mixed hyperlipidemia    Mixed anxiety depressive disorder    Anxiety      Continue present medication diet exercise  Daily stretching exercise and see Ortho to evaluate shoulder  Continue duloxetine  Recheck in 6 months  Reviewed Raghav  Continue mirtazapine discussed risk and benefit patient wants to continue  Continue follow-up Pap smear mammograms  Preventative medicine decreased caloric intake              Patient Instructions   [email or web address]

## 2019-06-19 ENCOUNTER — OFFICE VISIT (OUTPATIENT)
Dept: ORTHOPEDIC SURGERY | Facility: CLINIC | Age: 59
End: 2019-06-19

## 2019-06-19 DIAGNOSIS — IMO0002 BURSITIS/TENDONITIS, SHOULDER: ICD-10-CM

## 2019-06-19 DIAGNOSIS — M25.511 ACUTE PAIN OF RIGHT SHOULDER: Primary | ICD-10-CM

## 2019-06-19 PROCEDURE — 73030 X-RAY EXAM OF SHOULDER: CPT | Performed by: NURSE PRACTITIONER

## 2019-06-19 PROCEDURE — 99203 OFFICE O/P NEW LOW 30 MIN: CPT | Performed by: NURSE PRACTITIONER

## 2019-06-19 PROCEDURE — 20610 DRAIN/INJ JOINT/BURSA W/O US: CPT | Performed by: NURSE PRACTITIONER

## 2019-06-19 RX ORDER — NAPROXEN SODIUM 220 MG
220 TABLET ORAL 2 TIMES DAILY PRN
COMMUNITY

## 2019-06-19 RX ADMIN — METHYLPREDNISOLONE ACETATE 80 MG: 80 INJECTION, SUSPENSION INTRA-ARTICULAR; INTRALESIONAL; INTRAMUSCULAR; SOFT TISSUE at 15:18

## 2019-06-19 NOTE — PROGRESS NOTES
Patient: Ro Perez    YOB: 1960    Medical Record Number: [id number]    Chief Complaints: Right shoulder pain    History of Present Illness:     59 y.o. female patient who presents for her right shoulder.  She fell about 15 months ago and has had problems with the shoulder ever since.  She reports lateral and anterior pain.  She also gets some occasional pain in the back of her scapula.  She describes the pain is a moderate, constant dull ache.  She reports intermittent stabbing pains with certain movements.  Pain is worse with sitting, standing, driving and especially with laying down.  Rest, ice and anti-inflammatories have all helped modestly.  Denies any mechanical symptoms or instability.  Denies any radicular symptoms down the arm.    Allergies:   Allergies   Allergen Reactions   • Codeine Hives   • Sulfa Antibiotics        Home Medications:      Current Outpatient Medications:   •  ALPRAZolam (XANAX) 0.5 MG tablet, One half to one tablet daily as needed for anxiety, caution sedation do not drive., Disp: 30 tablet, Rfl: 0  •  DULoxetine (CYMBALTA) 60 MG capsule, Take 1 capsule by mouth 2 (Two) Times a Day., Disp: 180 capsule, Rfl: 1  •  mirtazapine (REMERON) 15 MG tablet, Take 1 tablet by mouth Every Night., Disp: 90 tablet, Rfl: 1  •  naproxen sodium (ALEVE) 220 MG tablet, Take 220 mg by mouth 2 (Two) Times a Day As Needed., Disp: , Rfl:   •  Cholecalciferol (VITAMIN D) 1000 UNITS tablet, Take  by mouth., Disp: , Rfl:     Past Medical History:   Diagnosis Date   • Anemia    • Benign breast lumps    • Bloating    • Depression    • Fatigue    • Hyperlipidemia    • Insomnia    • Shortness of breath    • Urgency incontinence    • Weight gain        Past Surgical History:   Procedure Laterality Date   •  SECTION     • LASER ABLATION         Social History     Occupational History   • Not on file   Tobacco Use   • Smoking status: Current Every Day Smoker     Packs/day: 0.50     Years:  20.00     Pack years: 10.00     Types: Cigarettes   • Smokeless tobacco: Never Used   Substance and Sexual Activity   • Alcohol use: No   • Drug use: No   • Sexual activity: No      Social History     Social History Narrative   • Not on file   She is right-hand dominant.  Works as a .    Family History   Problem Relation Age of Onset   • Osteoporosis Mother    • Osteoporosis Maternal Grandmother    • No Known Problems Father    • No Known Problems Sister    • No Known Problems Daughter    • No Known Problems Daughter        Review of Systems:      Constitutional: Denies fever, shaking or chills   Eyes: Denies change in visual acuity   HEENT: Denies nasal congestion or sore throat   Respiratory: Denies cough or shortness of breath   Cardiovascular: Denies chest pain or edema  Endocrine: Denies tremors, palpitations, intolerance of heat or cold, polyuria, polydipsia.  GI: Denies abdominal pain, nausea, vomiting, bloody stools or diarrhea  : Denies frequency, urgency, incontinence, retention, or nocturia.  Musculoskeletal: Denies numbness, tingling or loss of motor function except as above  Integument: Denies rash, lesion or ulceration   Neurologic: Denies headache or focal weakness, deficits  Heme: Denies spontaneous or excessive bleeding, epistaxis, hematuria, melena, fatigue, enlarged or tender lymph nodes.      All other pertinent positives and negatives as noted above in HPI.    Physical Exam: 59 y.o. female    There were no vitals filed for this visit.    General:  Patient is awake and alert.  Appears in no acute distress or discomfort.    Psych:  Affect and demeanor are appropriate.    Eyes:  Conjunctiva and sclera appear grossly normal.  Eyes track well and EOM seem to be intact.    Ears:  No gross abnormalities.  Hearing adequate for the exam.    Cardiovascular:  Regular rate and rhythm.    Lungs:  Good chest expansion.  Breathing unlabored.    Lymph:  No palpable masses or adenopathy  in the affected extremity    Neck: Skin is benign.  No tenderness.  No step-offs.  Good motion.  Spurling's does reproduce some upper trapezius pain but no shoulder or arm symptoms.    Extremities:  Right shoulder is examined.  Skin is benign.  No gross abnormalities on inspection including any atrophy, swellings, or masses.  No palpable masses or adenopathy. No focal tenderness.  Full shoulder motion.  No evident instability or apprehension.  Positive Neer and Little manuevers.  Negative active compression maneuver.  Negative Speeds and Yergasons manuevers.  Rotator cuff strength is difficult to assess due to discomfort but she has at least 5- out of 5 strength with elevation in the scapular plane.  Internal and external rotation are both 5 out of 5.  5 out of 5 strength with abduction as well.  Biceps, triceps, and .  Intact sensation throughout the arm.  Palpable radial pulse.  Good skin turgor.  Brisk cap refill.         Radiology:   AP, scapular Y, and axillary views of the right shoulder are ordered by myself and reviewed to evaluate the patient's complaint.  No comparison films are immediately available.  The x-rays show no obvious acute abnormalities, lesions, masses, significant degenerative changes, or other concerning findings.  The acromiohumeral interval is normal.  Glenoid version appears normal as well.    Assessment/Plan: Right shoulder subacromial/subdeltoid bursitis, possible small or partial thickness rotator cuff tear    We discussed options for her.  I recommend that we try managing this conservatively.  I suggested an injection and some therapy.  The risk, benefits and alternatives to the injection were discussed.  She consented.  The injection was performed as described below.  I have given her a referral to physical therapy.  Want to see her back in 4 to 6 weeks if no better.    Blayne Miguel MD    06/19/2019    CC to Epley, James, APRN    Large Joint Arthrocentesis: R subacromial  bursa  Date/Time: 6/19/2019 3:18 PM  Consent given by: patient  Site marked: site marked  Timeout: Immediately prior to procedure a time out was called to verify the correct patient, procedure, equipment, support staff and site/side marked as required   Supporting Documentation  Indications: pain   Procedure Details  Location: shoulder - R subacromial bursa  Preparation: Patient was prepped and draped in the usual sterile fashion  Needle gauge: 21 G.  Approach: posterior  Medications administered: 80 mg methylPREDNISolone acetate 80 MG/ML (50 ml/MD-2 CC 2% Lidocaine HCI NDC 1453-3693-12 lot -DK ex 09/01/2020)  Patient tolerance: patient tolerated the procedure well with no immediate complications

## 2019-06-20 RX ORDER — METHYLPREDNISOLONE ACETATE 80 MG/ML
80 INJECTION, SUSPENSION INTRA-ARTICULAR; INTRALESIONAL; INTRAMUSCULAR; SOFT TISSUE
Status: COMPLETED | OUTPATIENT
Start: 2019-06-19 | End: 2019-06-19

## 2019-07-05 ENCOUNTER — HOSPITAL ENCOUNTER (OUTPATIENT)
Dept: PHYSICAL THERAPY | Facility: HOSPITAL | Age: 59
Setting detail: THERAPIES SERIES
Discharge: HOME | End: 2019-07-05

## 2019-07-05 DIAGNOSIS — M25.511 CHRONIC RIGHT SHOULDER PAIN: Primary | ICD-10-CM

## 2019-07-05 DIAGNOSIS — R29.898 WEAKNESS OF SHOULDER: ICD-10-CM

## 2019-07-05 DIAGNOSIS — G89.29 CHRONIC RIGHT SHOULDER PAIN: Primary | ICD-10-CM

## 2019-07-05 DIAGNOSIS — M75.51 BURSITIS OF RIGHT SHOULDER: ICD-10-CM

## 2019-07-05 PROCEDURE — 97110 THERAPEUTIC EXERCISES: CPT

## 2019-07-05 PROCEDURE — 97161 PT EVAL LOW COMPLEX 20 MIN: CPT

## 2019-07-05 NOTE — THERAPY EVALUATION
Outpatient Physical Therapy Ortho Initial Evaluation  [facility]     Patient Name: Ro Perez  : 1960  MRN: [id number]  Today's Date: 2019      Visit Date: 2019    Patient Active Problem List   Diagnosis   • Anxiety   • Abnormal finding on mammography   • Mixed anxiety depressive disorder   • Osteoarthritis of hip   • Osteopenia   • Vitamin D deficiency   • Hyperlipidemia   • Elevated blood pressure (not hypertension)   • Screening   • Chronic left-sided low back pain without sciatica   • High risk medication use        Past Medical History:   Diagnosis Date   • Anemia    • Benign breast lumps    • Bloating    • Depression    • Fatigue    • Hyperlipidemia    • Insomnia    • Shortness of breath    • Urgency incontinence    • Weight gain         Past Surgical History:   Procedure Laterality Date   •  SECTION     • LASER ABLATION         Visit Dx:     ICD-10-CM ICD-9-CM   1. Chronic right shoulder pain M25.511 719.41    G89.29 338.29   2. Bursitis of right shoulder M75.51 726.10   3. Weakness of shoulder R29.898 719.61         Patient History     Row Name 19 1400             History    Chief Complaint  Difficulty with daily activities;Muscle weakness;Pain  -CJ      Type of Pain  Shoulder pain  -CJ      Date Current Problem(s) Began  19  -RS      Brief Description of Current Complaint  The pt reports she fell in 2018 which led to R shoulder pain. She reports her pain was constant in her RUE, specifically driving, reaching away from her body, and sleeping. She reports they did x-rays and said she had an inflamed tendon and possibly a pinched nerve in her neck. Since receiving a cortisone injection on 19 and currently has had no pain. 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 She denies numbness or tingling.  -RS      Previous treatment for THIS PROBLEM  Injections  -RS       Patient/Caregiver Goals  Relieve pain;Return to prior level of function;Improve strength  -RS      Hand Dominance  right-handed  -RS         Pain     Pain Location  Shoulder  -RS      Pain at Present  0  -RS      Pain at Best  0  -RS      Pain at Worst  9  -RS      Is your sleep disturbed?  No  -RS         Daily Activities    Primary Language  English  -RS      How does patient learn best?  Listening;Reading  -RS      Pt Participated in POC and Goals  Yes  -RS         Safety    Are you being hurt, hit, or frightened by anyone at home or in your life?  No  -RS      Are you being neglected by a caregiver  No  -RS        User Key  (r) = Recorded By, (t) = Taken By, (c) = Cosigned By    Initials Name Provider Type    Jerry Soto, PT Physical Therapist    RS Samina Finney PT Physical Therapist          PT Ortho     Row Name 07/05/19 1500       Posture/Observations    Alignment Options  Rounded shoulders  -RS    Rounded Shoulders  Moderate  -RS       Special Tests/Palpation    Special Tests/Palpation  Shoulder  -RS       Shoulder Impingement/Rotator Cuff Special Tests    Little-Cresencio Test (RC Lesion vs. Bursitis)  Right:;Positive slight pain  -RS    Neer Impingement Test (RC Lesion vs. Bursitis)  Right:;Negative  -RS       Shoulder Girdle Palpation    Shoulder Girdle Palpation?  Yes  -RS    Supraspinatus Insertion  Right:;Tender  -RS    Upper Trap  Right:;Tender  -RS    Levator Scapula  Right:;Tender  -RS       General ROM    RT Upper Ext  Rt Shoulder ABduction;Rt Shoulder Flexion;Rt Shoulder External Rotation;Rt Shoulder Internal Rotation  -RS       Right Upper Ext    Rt Shoulder Abduction AROM  0-170  -RS    Rt Shoulder Flexion AROM  0-170  -RS    Rt Shoulder External Rotation AROM  0-90  -RS    Rt Shoulder Internal Rotation AROM  0-65  -RS       MMT (Manual Muscle Testing)    Rt Upper Ext  Rt Shoulder Flexion;Rt Shoulder ABduction;Rt Shoulder Internal Rotation;Rt Shoulder External Rotation;Rt  Elbow/Forearm WNL;Rt Elbow/Forearm WFL  -RS    Lt Upper Ext  Lt Elbow/Forearm WNL;Lt Shoulder Flexion;Lt Shoulder ABduction;Lt Shoulder Internal Rotation;Lt Shoulder External Rotation  -RS       MMT Right Upper Ext    Rt Shoulder Flexion MMT, Gross Movement  (4/5) good  -RS    Rt Shoulder ABduction MMT, Gross Movement  (4-/5) good minus  -RS    Rt Shoulder Internal Rotation MMT, Gross Movement  (4/5) good  -RS    Rt Shoulder External Rotation MMT, Gross Movement  (4-/5) good minus  -RS       MMT Left Upper Ext    Lt Shoulder Flexion MMT, Gross Movement  (4+/5) good plus  -RS    Lt Shoulder ABduction MMT, Gross Movement  (4/5) good  -RS    Lt Shoulder Internal Rotation MMT, Gross Movement  (4+/5) good plus  -RS    Lt Shoulder External Rotation MMT, Gross Movement  (4/5) good  -RS       Sensation    Sensation WNL?  WNL  -RS      User Key  (r) = Recorded By, (t) = Taken By, (c) = Cosigned By    Initials Name Provider Type    RS Samina Finney, PT Physical Therapist                      Therapy Education  Education Details: Role of outpatient PT, POC, differential diagnosis, initial HEP, expectations, goals, RC anatomy  Given: HEP  Program: New  How Provided: Verbal, Demonstration, Written  Provided to: Patient  Level of Understanding: Verbalized     PT OP Goals     Row Name 07/05/19 1500          PT Short Term Goals    STG Date to Achieve  07/20/19  -RS     STG 1  Patient will be independent with initial HEP.  -RS     STG 1 Progress  New  -RS     STG 1 Progress Comments  The pt will demonstrate good understanding of proper body mechanics while at work (desk job) for long term condition management.  -RS     STG 2  ne  -RS        Long Term Goals    LTG Date to Achieve  08/29/19  -RS     LTG 1  Patient will be independent with progressive HEP for long term management of current condition.  -RS     LTG 1 Progress  New  -RS     LTG 2  Patient will score </=30% disability on the DASH to indicate improved perceived  performance with ADLs.  -RS     LTG 2 Progress  New  -RS     LTG 3  Patient will demonstrates improved strength of R shoulder (RC) to at least 4+/5 for improved functional strength.  -RS     LTG 3 Progress  New  -RS        Time Calculation    PT Goal Re-Cert Due Date  10/03/19  -RS       User Key  (r) = Recorded By, (t) = Taken By, (c) = Cosigned By    Initials Name Provider Type    RS Samina Finney, PT Physical Therapist          PT Assessment/Plan     Row Name 07/05/19 1500          PT Assessment    Functional Limitations  Performance in work activities;Performance in self-care ADL;Performance in leisure activities  -RS     Impairments  Posture;Poor body mechanics;Pain;Muscle strength  -RS     Assessment Comments  Ro Perez is a 59 y.o. female referred to physical therapy for R shoulder pain. She presents with a stable clinical presentation, along with no remarkable comorbidities and personal factors of chronicity of condition that may impact her progress in the plan of care. Pt presents today with decreased R rotator cuff strength,  Impaired scapulothoracic mobility, and increased tone R upper trap. . her signs and symptoms are consistent with referring diagnosis. The previous impairments limit her ability to perform household chores, left, reach. She scores 42.5 on the DASH. Pt will benefit from skilled PT to address the previous impairments and return to PLOF.  -RS     Please refer to paper survey for additional self-reported information  Yes  -RS     Rehab Potential  Excellent  -RS     Patient/caregiver participated in establishment of treatment plan and goals  Yes  -RS     Patient would benefit from skilled therapy intervention  Yes  -RS        PT Plan    PT Frequency  2x/week;1x/week  -RS     Predicted Duration of Therapy Intervention (Therapy Eval)  6 weeks  -RS     Planned CPT's?  PT EVAL LOW COMPLEXITY: 51060;PT RE-EVAL: 13254;PT THER PROC EA 15 MIN: 42903;PT THER ACT EA 15 MIN: 46464;PT MANUAL THERAPY  EA 15 MIN: 37735;PT NEUROMUSC RE-EDUCATION EA 15 MIN: 79093;PT SELF CARE/HOME MGMT/TRAIN EA 15: 44661;PT HOT OR COLD PACK TREAT MCARE;PT ELECTRICAL STIM UNATTEND: ;PT ULTRASOUND EA 15 MIN: 41022  -RS     Physical Therapy Interventions (Optional Details)  stretching;strengthening;patient/family education;postural re-education;modalities;manual therapy techniques  -RS     PT Plan Comments  Assess tolerance for initial HEP,  progress as tolerated focused on improved rotator cuff strength and improved scapulothoracic mechanics.  -RS       User Key  (r) = Recorded By, (t) = Taken By, (c) = Cosigned By    Initials Name Provider Type    RS Samina Finney PT Physical Therapist            Exercises     Row Name 07/05/19 1400             Total Minutes    20600 - PT Therapeutic Exercise Minutes  12  -RS         Exercise 1    Exercise Name 1  sidelying R shoulder ER  -RS      Cueing 1  Verbal  -RS      Sets 1  2  -RS      Reps 1  15  -RS         Exercise 2    Exercise Name 2  seated upper trap stretch  -RS      Cueing 2  Verbal;Demo  -RS      Reps 2  3  -RS      Time 2  20s  -RS         Exercise 3    Exercise Name 3  supine HA  -RS      Cueing 3  Verbal  -RS      Sets 3  2  -RS      Reps 3  10  -RS      Additional Comments  RTb  -RS         Exercise 4    Exercise Name 4  supine B soulder ER  -RS      Cueing 4  Verbal  -RS      Sets 4  2  -RS      Reps 4  10  -RS      Additional Comments  RTB  -RS         Exercise 5    Exercise Name 5  standing row  -RS      Cueing 5  Verbal;Demo  -RS      Sets 5  2  -RS      Reps 5  10  -RS         Exercise 6    Exercise Name 6  consider addition of arm bike (posterior), PNF star in supine, shoulder extension, serratus punch as tolerated  -RS        User Key  (r) = Recorded By, (t) = Taken By, (c) = Cosigned By    Initials Name Provider Type    RS Samina Finney, LEONIE Physical Therapist                        Outcome Measure Options: Disabilities of the Arm, Shoulder, and Hand  (DASH)  DASH  Open a tight or new jar.: Mild Difficulty  Write: No Difficulty  Turn a key: No Difficulty  Prepare a meal: Moderate Difficulty  Push open a heavy door: Moderate Difficulty  Place an object on a shelf above your head: Moderate Difficulty  Do heavy household chores (e.g., wash walls, wash floors): Unable  Garden or do yard work: Moderate Difficulty  Make a bed: No Difficulty  Carry a shopping bag or briefcase: Severe Difficulty  Carry a heavy object (over 10 lbs): Severe Difficulty  Change a lightbulb overhead: Moderate Difficulty  Wash or blow dry your hair: Severe Difficulty  Wash your back: Severe Difficulty  Put on a pullover sweater: Mild Difficulty  Use a knife to cut food: Mild Difficulty  Recreational activities in which require little effort (e.g., cardplaying, knitting, etc.): No Difficulty  Recreational activities in which you take some force or impact through your arm, should or hand (e.g. golf, hammering, tennis, etc.): Severe Difficulty  Recreational Activities in which you move your arm freely (e.g., frisbee, badminton, etc.): Moderate Difficulty  Manage transportation needs (getting from one place to another): No Difficulty  Sexual Activities: No Difficulty  During the past week, to what extent has your arm, shoulder, or hand problem interfered with your normal social activites with family, friends, neighbors or groups?: Not at all  During the past week, were you limited in your work or other regular daily activities as a result of your arm, shoulder or hand problem?: Very limited  Arm, Shoulder, or hand pain: Moderate  Arm, shoulder or hand pain when you performed any specific activity: Moderate  Tingling (pins and needles) in your arm, shoulder, or hand: None  Weakness in your arm, shoulder or hand: Moderate  Stiffness in your arm, shoulder or hand: Moderate  During the past week, how much difficulty have you had sleeping because of the pain in your arm, shoulder or hand?: Moderate  Difficiculty  I feel less capable, less confident or less useful because of my arm, shoulder or hand problem: Strongly Agree  DASH Sum : 81  Number of Questions Answered: 30  DASH Score: 42.5         Time Calculation:     Start Time: 1415  Stop Time: 1445  Time Calculation (min): 30 min     Therapy Charges for Today     Code Description Service Date Service Provider Modifiers Qty    94841300198 HC PT THER PROC EA 15 MIN 7/5/2019 Samina Finney, PT GP 1    [id number]  PT EVAL LOW COMPLEXITY 1 7/5/2019 Samina Finney, PT GP 1          PT G-Codes  Outcome Measure Options: Disabilities of the Arm, Shoulder, and Hand (DASH)         Samina Finney, LEONIE  7/5/2019

## 2019-07-12 ENCOUNTER — HOSPITAL ENCOUNTER (OUTPATIENT)
Dept: PHYSICAL THERAPY | Facility: HOSPITAL | Age: 59
Setting detail: THERAPIES SERIES
Discharge: HOME | End: 2019-07-12

## 2019-07-12 DIAGNOSIS — M75.51 BURSITIS OF RIGHT SHOULDER: ICD-10-CM

## 2019-07-12 DIAGNOSIS — M25.511 CHRONIC RIGHT SHOULDER PAIN: Primary | ICD-10-CM

## 2019-07-12 DIAGNOSIS — G89.29 CHRONIC RIGHT SHOULDER PAIN: Primary | ICD-10-CM

## 2019-07-12 DIAGNOSIS — R29.898 WEAKNESS OF SHOULDER: ICD-10-CM

## 2019-07-12 PROCEDURE — 97110 THERAPEUTIC EXERCISES: CPT

## 2019-07-12 NOTE — THERAPY TREATMENT NOTE
Outpatient Physical Therapy Ortho Treatment Note  [facility]     Patient Name: Ro Perez  : 1960  MRN: [id number]  Today's Date: 2019      Visit Date: 2019    Visit Dx:    ICD-10-CM ICD-9-CM   1. Chronic right shoulder pain M25.511 719.41    G89.29 338.29   2. Bursitis of right shoulder M75.51 726.10   3. Weakness of shoulder R29.898 719.61       Patient Active Problem List   Diagnosis   • Anxiety   • Abnormal finding on mammography   • Mixed anxiety depressive disorder   • Osteoarthritis of hip   • Osteopenia   • Vitamin D deficiency   • Hyperlipidemia   • Elevated blood pressure (not hypertension)   • Screening   • Chronic left-sided low back pain without sciatica   • High risk medication use        Past Medical History:   Diagnosis Date   • Anemia    • Benign breast lumps    • Bloating    • Depression    • Fatigue    • Hyperlipidemia    • Insomnia    • Shortness of breath    • Urgency incontinence    • Weight gain         Past Surgical History:   Procedure Laterality Date   •  SECTION     • LASER ABLATION                         PT Assessment/Plan     Row Name 19 1500          PT Assessment    Assessment Comments  Ms. Perez returns for first follow up after initial eval with reports of  no increased pain. She tolerates therex progression focused on improved rotator cuff strength and stability without reports of increased pain. She continues to be a good candidate for skilled PT.  -RS        PT Plan    PT Plan Comments  Assess tolerance for therex progression, update HEP, consider standing shoulder ER/ HA, row and press at tuff stuff  -RS       User Key  (r) = Recorded By, (t) = Taken By, (c) = Cosigned By    Initials Name Provider Type    Samina Ta, PT Physical Therapist            Exercises     Row Name 19 1400             Subjective Comments    Subjective Comments  Having no pain since that shot  -RS         Subjective Pain    Able to rate subjective  pain?  yes  -RS      Pre-Treatment Pain Level  0  -RS         Total Minutes    54867 - PT Therapeutic Exercise Minutes  38  -RS         Exercise 1    Exercise Name 1  sidelying R shoulder ER  -RS      Cueing 1  Verbal  -RS      Sets 1  3  -RS      Reps 1  10  -RS      Additional Comments  2#  -RS         Exercise 2    Exercise Name 2  seated upper trap stretch  -RS      Cueing 2  Verbal;Demo  -RS      Reps 2  3  -RS      Time 2  20s  -RS         Exercise 3    Exercise Name 3  supine HA  -RS      Cueing 3  Verbal  -RS      Sets 3  2  -RS      Reps 3  10  -RS      Additional Comments  RTB  -RS         Exercise 4    Exercise Name 4  supine B soulder ER  -RS      Cueing 4  Verbal  -RS      Sets 4  2  -RS      Reps 4  10  -RS      Additional Comments  RTB  -RS         Exercise 5    Exercise Name 5  standing row  -RS      Cueing 5  Verbal;Demo  -RS      Sets 5  2  -RS      Reps 5  10  -RS      Additional Comments  GTB  -RS         Exercise 6    Exercise Name 6  reverse arm bike  -RS      Cueing 6  Verbal  -RS      Time 6  4min  -RS         Exercise 7    Exercise Name 7  shoulder extension with TB  -RS      Cueing 7  Verbal  -RS      Sets 7  2  -RS      Reps 7  10  -RS      Time 7  GTB  -RS         Exercise 8    Exercise Name 8  supine serratus punch  -RS      Cueing 8  Verbal  -RS      Sets 8  2  -RS      Reps 8  10  -RS      Additional Comments  2#  -RS         Exercise 9    Exercise Name 9  prone I, T  -RS      Cueing 9  Verbal;Tactile  -RS      Reps 9  10 ea  -RS         Exercise 10    Exercise Name 10  supine PNF star- D@ flex/ext, D1 flex/ext  -RS      Cueing 10  Verbal  -RS      Reps 10  10 each way  -RS      Additional Comments  therapist assist with TB  -RS         Exercise 11    Exercise Name 11  ball roll up wall with small lift each UE at top  -RS      Cueing 11  Verbal;Demo  -RS      Reps 11  10  -RS      Additional Comments  for inf trap  -RS         Exercise 12    Exercise Name 12  PNF rhythmic stab at 90  supine  -RS      Cueing 12  Tactile  -RS      Reps 12  2  -RS      Time 12  30s  -RS      Additional Comments  rhythmic stab  -RS        User Key  (r) = Recorded By, (t) = Taken By, (c) = Cosigned By    Initials Name Provider Type    Samina Ta PT Physical Therapist                       PT OP Goals     Row Name 07/12/19 1400          PT Short Term Goals    STG Date to Achieve  07/20/19  -RS     STG 1  Patient will be independent with initial HEP.  -RS     STG 1 Progress  Ongoing  -RS        Long Term Goals    LTG Date to Achieve  08/29/19  -RS     LTG 1  Patient will be independent with progressive HEP for long term management of current condition.  -RS     LTG 1 Progress  Ongoing  -RS     LTG 2  Patient will score </=30% disability on the DASH to indicate improved perceived performance with ADLs.  -RS     LTG 2 Progress  Ongoing  -RS     LTG 3  Patient will demonstrates improved strength of R shoulder (RC) to at least 4+/5 for improved functional strength.  -RS     LTG 3 Progress  Ongoing  -RS       User Key  (r) = Recorded By, (t) = Taken By, (c) = Cosigned By    Initials Name Provider Type    Samina Ta PT Physical Therapist                         Time Calculation:   Start Time: 1442  Stop Time: 1522  Time Calculation (min): 40 min  Therapy Charges for Today     Code Description Service Date Service Provider Modifiers Qty    72023771866 HC PT THER PROC EA 15 MIN 7/12/2019 Samina Finney, PT GP 3                    Samina Finney PT  7/12/2019

## 2019-07-19 ENCOUNTER — HOSPITAL ENCOUNTER (OUTPATIENT)
Dept: PHYSICAL THERAPY | Facility: HOSPITAL | Age: 59
Setting detail: THERAPIES SERIES
Discharge: HOME | End: 2019-07-19

## 2019-07-19 DIAGNOSIS — M25.511 CHRONIC RIGHT SHOULDER PAIN: Primary | ICD-10-CM

## 2019-07-19 DIAGNOSIS — G89.29 CHRONIC RIGHT SHOULDER PAIN: Primary | ICD-10-CM

## 2019-07-19 DIAGNOSIS — R29.898 WEAKNESS OF SHOULDER: ICD-10-CM

## 2019-07-19 DIAGNOSIS — M75.51 BURSITIS OF RIGHT SHOULDER: ICD-10-CM

## 2019-07-19 PROCEDURE — 97110 THERAPEUTIC EXERCISES: CPT

## 2019-07-19 NOTE — THERAPY TREATMENT NOTE
Outpatient Physical Therapy Ortho Treatment Note  [facility]     Patient Name: Ro Perez  : 1960  MRN: [id number]  Today's Date: 2019      Visit Date: 2019    Visit Dx:    ICD-10-CM ICD-9-CM   1. Chronic right shoulder pain M25.511 719.41    G89.29 338.29   2. Bursitis of right shoulder M75.51 726.10   3. Weakness of shoulder R29.898 719.61       Patient Active Problem List   Diagnosis   • Anxiety   • Abnormal finding on mammography   • Mixed anxiety depressive disorder   • Osteoarthritis of hip   • Osteopenia   • Vitamin D deficiency   • Hyperlipidemia   • Elevated blood pressure (not hypertension)   • Screening   • Chronic left-sided low back pain without sciatica   • High risk medication use        Past Medical History:   Diagnosis Date   • Anemia    • Benign breast lumps    • Bloating    • Depression    • Fatigue    • Hyperlipidemia    • Insomnia    • Shortness of breath    • Urgency incontinence    • Weight gain         Past Surgical History:   Procedure Laterality Date   •  SECTION     • LASER ABLATION                         PT Assessment/Plan     Row Name 19 1500          PT Assessment    Assessment Comments  Increased resistance during supine/sidelying rotator cuff strengthening this date with good tolerance. Added press at tuff stuff with good tolerance. She continues to demonstrate decreased rotator cuff strength and requires tactile cues for provpper scap thoracic activation.  -RS        PT Plan    PT Plan Comments  Assess tolerance for therex progression, cnsider standing shoulder ER/CHESTER, prone Y.  -RS       User Key  (r) = Recorded By, (t) = Taken By, (c) = Cosigned By    Initials Name Provider Type    RS Samina Finney, PT Physical Therapist            Exercises     Row Name 19 1400             Subjective Comments    Subjective Comments  No pain, no soreness.  -RS         Subjective Pain    Able to rate subjective pain?  yes  -RS      Pre-Treatment  Pain Level  0  -RS         Total Minutes    18675 - PT Therapeutic Exercise Minutes  40  -RS         Exercise 1    Exercise Name 1  sidelying R shoulder ER  -RS      Cueing 1  Verbal  -RS      Sets 1  3  -RS      Reps 1  10  -RS      Additional Comments  3#  -RS         Exercise 2    Exercise Name 2  seated upper trap stretch  -RS      Cueing 2  Verbal;Demo  -RS      Reps 2  3  -RS      Time 2  20s  -RS         Exercise 3    Exercise Name 3  supine HA  -RS      Cueing 3  Verbal  -RS      Sets 3  2  -RS      Reps 3  10  -RS      Additional Comments  GTB  -RS         Exercise 4    Exercise Name 4  supine B soulder ER  -RS      Cueing 4  Verbal  -RS      Sets 4  2  -RS      Reps 4  10  -RS      Additional Comments  GTB  -RS         Exercise 5    Exercise Name 5  standing row  -RS      Cueing 5  Verbal;Demo  -RS      Sets 5  2  -RS      Reps 5  10  -RS      Additional Comments  GTB  -RS         Exercise 6    Exercise Name 6  reverse arm bike  -RS      Cueing 6  Verbal  -RS      Time 6  4min  -RS         Exercise 7    Exercise Name 7  shoulder extension with TB  -RS      Cueing 7  Verbal  -RS      Sets 7  2  -RS      Reps 7  10  -RS      Time 7  GTB  -RS         Exercise 8    Exercise Name 8  supine serratus punch  -RS      Cueing 8  Verbal  -RS      Sets 8  2  -RS      Reps 8  10  -RS      Additional Comments  3#  -RS         Exercise 9    Exercise Name 9  prone I, T  -RS      Cueing 9  Verbal;Tactile  -RS      Reps 9  10 ea  -RS      Additional Comments  cues for scap activation  -RS         Exercise 10    Exercise Name 10  supine PNF star- D@ flex/ext, D1 flex/ext  -RS      Cueing 10  Verbal  -RS      Reps 10  10 each way  -RS      Time 10  RTB  -RS      Additional Comments  therapist assist with TB  -RS         Exercise 11    Exercise Name 11  ball roll up wall with small lift each UE at top  -RS      Cueing 11  Verbal;Demo  -RS      Reps 11  10  -RS      Additional Comments  for inf trap  -RS         Exercise 12     Exercise Name 12  PNF rhythmic stab at 90 supine  -RS      Cueing 12  Tactile  -RS      Reps 12  2  -RS      Time 12  30s  -RS      Additional Comments  rhythmic stab  -RS         Exercise 13    Exercise Name 13  press with RUE at [facility]  -RS      Cueing 13  Verbal;Demo  -RS      Reps 13  15  -RS      Time 13  20#  -RS         Exercise 14    Exercise Name 14  ER/IR isometric walkout at [facility]  -RS      Cueing 14  Verbal;Demo  -RS      Reps 14  5 laps ea  -RS      Additional Comments  10#, towel under elbow  -RS        User Key  (r) = Recorded By, (t) = Taken By, (c) = Cosigned By    Initials Name Provider Type    RS Samina Finney, PT Physical Therapist                                          Time Calculation:   Start Time: 1440  Stop Time: 1522  Time Calculation (min): 42 min  Therapy Charges for Today     Code Description Service Date Service Provider Modifiers Qty    45641267751 HC PT THER PROC EA 15 MIN 7/19/2019 Samina Finney, PT GP 3                    Samina Finney PT  7/19/2019

## 2019-07-24 ENCOUNTER — HOSPITAL ENCOUNTER (OUTPATIENT)
Dept: PHYSICAL THERAPY | Facility: HOSPITAL | Age: 59
Setting detail: THERAPIES SERIES
Discharge: HOME | End: 2019-07-24

## 2019-07-24 DIAGNOSIS — M75.51 BURSITIS OF RIGHT SHOULDER: ICD-10-CM

## 2019-07-24 DIAGNOSIS — G89.29 CHRONIC RIGHT SHOULDER PAIN: Primary | ICD-10-CM

## 2019-07-24 DIAGNOSIS — R29.898 WEAKNESS OF SHOULDER: ICD-10-CM

## 2019-07-24 DIAGNOSIS — M25.511 CHRONIC RIGHT SHOULDER PAIN: Primary | ICD-10-CM

## 2019-07-24 PROCEDURE — 97110 THERAPEUTIC EXERCISES: CPT

## 2019-07-24 NOTE — THERAPY TREATMENT NOTE
Outpatient Physical Therapy Ortho Treatment Note  [facility]     Patient Name: Ro Perez  : 1960  MRN: [id number]  Today's Date: 2019      Visit Date: 2019    Visit Dx:    ICD-10-CM ICD-9-CM   1. Chronic right shoulder pain M25.511 719.41    G89.29 338.29   2. Bursitis of right shoulder M75.51 726.10   3. Weakness of shoulder R29.898 719.61       Patient Active Problem List   Diagnosis   • Anxiety   • Abnormal finding on mammography   • Mixed anxiety depressive disorder   • Osteoarthritis of hip   • Osteopenia   • Vitamin D deficiency   • Hyperlipidemia   • Elevated blood pressure (not hypertension)   • Screening   • Chronic left-sided low back pain without sciatica   • High risk medication use        Past Medical History:   Diagnosis Date   • Anemia    • Benign breast lumps    • Bloating    • Depression    • Fatigue    • Hyperlipidemia    • Insomnia    • Shortness of breath    • Urgency incontinence    • Weight gain         Past Surgical History:   Procedure Laterality Date   •  SECTION     • LASER ABLATION                         PT Assessment/Plan     Row Name 19 1600          PT Assessment    Assessment Comments  The pt tolerates therex progression to include increased rotator cuff strengthening, shoulder extension with dowel, alphabet at wall, prone Y/W, wal slide with band, well with reports of fatigue, no pain. Provided updated HEP and pt demonstrates good understanding. Progressing well toward goals.  -RS        PT Plan    PT Plan Comments  Assess tolerancec for therex and HEP progression, conntinue to progress RC strength as tolerated.  -RS       User Key  (r) = Recorded By, (t) = Taken By, (c) = Cosigned By    Initials Name Provider Type    RS Samina Finney, PT Physical Therapist            Exercises     Row Name 19 1600             Subjective Comments    Subjective Comments  I was a little sore but it went away after a couple of minutes.  -RS          Subjective Pain    Able to rate subjective pain?  yes  -RS      Pre-Treatment Pain Level  0  -RS         Total Minutes    08077 - PT Therapeutic Exercise Minutes  42  -RS         Exercise 1    Exercise Name 1  sidelying R shoulder ER  -RS      Cueing 1  Verbal  -RS      Sets 1  3  -RS      Reps 1  10  -RS      Additional Comments  3#  -RS         Exercise 2    Exercise Name 2  seated upper trap stretch  -RS      Cueing 2  Verbal;Demo  -RS      Reps 2  3  -RS      Time 2  20s  -RS         Exercise 3    Exercise Name 3  seated HA  -RS      Cueing 3  Verbal  -RS      Sets 3  2  -RS      Reps 3  10  -RS      Additional Comments  GTB  -RS         Exercise 4    Exercise Name 4  seated B soulder ER  -RS      Cueing 4  Verbal  -RS      Sets 4  2  -RS      Reps 4  10  -RS      Additional Comments  GTB  -RS         Exercise 5    Exercise Name 5  single arm row  -RS      Cueing 5  Verbal;Demo  -RS      Sets 5  2  -RS      Reps 5  10  -RS      Additional Comments  40#  -RS         Exercise 6    Exercise Name 6  reverse arm bike  -RS      Cueing 6  Verbal  -RS      Time 6  4min  -RS         Exercise 7    Exercise Name 7  shoulder extension with TB  -RS      Cueing 7  Verbal  -RS      Sets 7  2  -RS      Reps 7  10  -RS      Time 7  GTB  -RS         Exercise 8    Exercise Name 8  supine serratus punch  -RS      Cueing 8  Verbal  -RS      Sets 8  2  -RS      Reps 8  10  -RS      Additional Comments  3#  -RS         Exercise 9    Exercise Name 9  prone I, T  -RS      Cueing 9  Verbal;Tactile  -RS      Reps 9  10 ea  -RS      Additional Comments  1#  -RS         Exercise 10    Exercise Name 10  supine PNF star- D@ flex/ext, D1 flex/ext  -RS      Cueing 10  Verbal  -RS      Reps 10  10 each way  -RS      Time 10  RTB  -RS      Additional Comments  therapist assist with TB  -RS         Exercise 11    Exercise Name 11  wall slide with TB+ lift off  -RS      Cueing 11  Verbal;Demo  -RS      Reps 11  10  -RS      Time 11  RTB  -RS       Additional Comments  for inf trap  -RS         Exercise 12    Exercise Name 12  PNF rhythmic stab at 90 supine  -RS      Cueing 12  Tactile  -RS      Reps 12  2  -RS      Time 12  30s  -RS      Additional Comments  rhythmic stab  -RS         Exercise 13    Exercise Name 13  press with RUE at [facility] stuff  -RS      Cueing 13  Verbal;Demo  -RS      Reps 13  15  -RS      Time 13  30#  -RS         Exercise 14    Exercise Name 14  ER/IR isometric walkout at [facility] stuff  -RS      Cueing 14  Verbal;Demo  -RS      Reps 14  5 laps ea  -RS      Additional Comments  10#, towel under elbow  -RS         Exercise 15    Exercise Name 15  wall alphabet  -RS      Cueing 15  Verbal  -RS      Reps 15  1xA-Z  -RS      Additional Comments  level 1 med ball  -RS         Exercise 16    Exercise Name 16  prone W, Y  -RS      Cueing 16  Demo  -RS      Reps 16  10ea  -RS      Additional Comments  no weight  -RS         Exercise 17    Exercise Name 17  shoulder extension with dowel  -RS      Cueing 17  Verbal  -RS      Reps 17  10  -RS      Time 17  3s  -RS        User Key  (r) = Recorded By, (t) = Taken By, (c) = Cosigned By    Initials Name Provider Type    RS Samina Finney, PT Physical Therapist                       PT OP Goals     Row Name 07/24/19 1600          PT Short Term Goals    STG Date to Achieve  07/20/19  -RS     STG 1  Patient will be independent with initial HEP.  -RS     STG 1 Progress  Met  -RS        Long Term Goals    LTG Date to Achieve  08/29/19  -RS     LTG 1  Patient will be independent with progressive HEP for long term management of current condition.  -RS     LTG 1 Progress  Progressing  -RS     LTG 2  Patient will score </=30% disability on the DASH to indicate improved perceived performance with ADLs.  -RS     LTG 2 Progress  Ongoing  -RS     LTG 3  Patient will demonstrates improved strength of R shoulder (RC) to at least 4+/5 for improved functional strength.  -RS     LTG 3 Progress  Progressing  -RS        User Key  (r) = Recorded By, (t) = Taken By, (c) = Cosigned By    Initials Name Provider Type    RS Samina Finney, PT Physical Therapist                         Time Calculation:   Start Time: 1605  Stop Time: 1650  Time Calculation (min): 45 min  Therapy Charges for Today     Code Description Service Date Service Provider Modifiers Qty    32812392030  PT THER PROC EA 15 MIN 7/24/2019 Samina Finney, PT GP 3                    Samina Finney PT  7/24/2019

## 2019-07-31 ENCOUNTER — HOSPITAL ENCOUNTER (OUTPATIENT)
Dept: PHYSICAL THERAPY | Facility: HOSPITAL | Age: 59
Setting detail: THERAPIES SERIES
Discharge: HOME | End: 2019-07-31

## 2019-07-31 DIAGNOSIS — R29.898 WEAKNESS OF SHOULDER: ICD-10-CM

## 2019-07-31 DIAGNOSIS — G89.29 CHRONIC RIGHT SHOULDER PAIN: Primary | ICD-10-CM

## 2019-07-31 DIAGNOSIS — M25.511 CHRONIC RIGHT SHOULDER PAIN: Primary | ICD-10-CM

## 2019-07-31 DIAGNOSIS — M75.51 BURSITIS OF RIGHT SHOULDER: ICD-10-CM

## 2019-07-31 PROCEDURE — 97110 THERAPEUTIC EXERCISES: CPT

## 2019-08-02 ENCOUNTER — APPOINTMENT (OUTPATIENT)
Dept: PHYSICAL THERAPY | Facility: HOSPITAL | Age: 59
End: 2019-08-02

## 2019-08-06 ENCOUNTER — APPOINTMENT (OUTPATIENT)
Dept: PHYSICAL THERAPY | Facility: HOSPITAL | Age: 59
End: 2019-08-06

## 2019-08-06 ENCOUNTER — TELEPHONE (OUTPATIENT)
Dept: PHYSICAL THERAPY | Facility: HOSPITAL | Age: 59
End: 2019-08-06

## 2019-08-09 ENCOUNTER — APPOINTMENT (OUTPATIENT)
Dept: PHYSICAL THERAPY | Facility: HOSPITAL | Age: 59
End: 2019-08-09

## 2019-11-04 ENCOUNTER — DOCUMENTATION (OUTPATIENT)
Dept: PHYSICAL THERAPY | Facility: HOSPITAL | Age: 59
End: 2019-11-04

## 2019-11-04 NOTE — THERAPY DISCHARGE NOTE
Outpatient Physical Therapy Discharge Summary         Patient Name: Ro Perez  : 1960  MRN: [id number]    Today's Date: 2019    Visit Dx:  No diagnosis found.    PT OP Goals     Row Name 19 1500          PT Short Term Goals    STG Date to Achieve  19  -LB     STG 1  Patient will be independent with initial HEP.  -LB     STG 1 Progress  Met  -LB        Long Term Goals    LTG Date to Achieve  19  -LB     LTG 1  Patient will be independent with progressive HEP for long term management of current condition.  -LB     LTG 1 Progress  Not Met  -LB     LTG 2  Patient will score </=30% disability on the DASH to indicate improved perceived performance with ADLs.  -LB     LTG 2 Progress  Not Met  -LB     LTG 3  Patient will demonstrates improved strength of R shoulder (RC) to at least 4+/5 for improved functional strength.  -LB     LTG 3 Progress  Not Met  -LB       User Key  (r) = Recorded By, (t) = Taken By, (c) = Cosigned By    Initials Name Provider Type    Brenda Duff PT Physical Therapist          OP PT Discharge Summary  Date of Discharge: 10/05/19  Reason for Discharge: Non-compliant  Outcomes Achieved: Patient able to partially acheive established goals  Discharge Destination: Home with home program  Discharge Instructions/Additional Comments: Pt did not return after last attended visit. Unable to formally reassess progress.       Time Calculation:                    Brenda Burgos PT  2019

## 2019-11-21 RX ORDER — DULOXETIN HYDROCHLORIDE 60 MG/1
CAPSULE, DELAYED RELEASE ORAL
Qty: 180 CAPSULE | Refills: 1 | Status: SHIPPED | OUTPATIENT
Start: 2019-11-21 | End: 2020-05-15

## 2019-11-21 RX ORDER — MIRTAZAPINE 15 MG/1
TABLET, FILM COATED ORAL
Qty: 90 TABLET | Refills: 1 | Status: SHIPPED | OUTPATIENT
Start: 2019-11-21 | End: 2020-05-15

## 2020-02-07 DIAGNOSIS — Z12.31 ENCOUNTER FOR SCREENING MAMMOGRAM FOR BREAST CANCER: Primary | ICD-10-CM

## 2020-02-10 ENCOUNTER — TRANSCRIBE ORDERS (OUTPATIENT)
Dept: ADMINISTRATIVE | Facility: HOSPITAL | Age: 60
End: 2020-02-10

## 2020-02-10 DIAGNOSIS — Z12.31 VISIT FOR SCREENING MAMMOGRAM: Primary | ICD-10-CM

## 2020-02-17 ENCOUNTER — HOSPITAL ENCOUNTER (OUTPATIENT)
Dept: MAMMOGRAPHY | Facility: HOSPITAL | Age: 60
Discharge: HOME | End: 2020-02-17
Admitting: NURSE PRACTITIONER

## 2020-02-17 DIAGNOSIS — Z12.31 VISIT FOR SCREENING MAMMOGRAM: ICD-10-CM

## 2020-02-17 PROCEDURE — 77063 BREAST TOMOSYNTHESIS BI: CPT

## 2020-02-17 PROCEDURE — 77067 SCR MAMMO BI INCL CAD: CPT

## 2020-05-15 RX ORDER — DULOXETIN HYDROCHLORIDE 60 MG/1
CAPSULE, DELAYED RELEASE ORAL
Qty: 60 CAPSULE | Refills: 0 | Status: SHIPPED | OUTPATIENT
Start: 2020-05-15 | End: 2020-05-18 | Stop reason: SDUPTHER

## 2020-05-15 RX ORDER — MIRTAZAPINE 15 MG/1
TABLET, FILM COATED ORAL
Qty: 30 TABLET | Refills: 0 | Status: SHIPPED | OUTPATIENT
Start: 2020-05-15 | End: 2020-05-18 | Stop reason: SDUPTHER

## 2020-05-18 ENCOUNTER — TELEMEDICINE (OUTPATIENT)
Dept: FAMILY MEDICINE CLINIC | Facility: CLINIC | Age: 60
End: 2020-05-18

## 2020-05-18 DIAGNOSIS — R06.02 SHORTNESS OF BREATH: ICD-10-CM

## 2020-05-18 DIAGNOSIS — E78.2 MIXED HYPERLIPIDEMIA: ICD-10-CM

## 2020-05-18 DIAGNOSIS — F41.9 ANXIETY: Primary | ICD-10-CM

## 2020-05-18 DIAGNOSIS — M85.80 OSTEOPENIA, UNSPECIFIED LOCATION: ICD-10-CM

## 2020-05-18 DIAGNOSIS — E55.9 VITAMIN D DEFICIENCY: ICD-10-CM

## 2020-05-18 DIAGNOSIS — Z79.899 HIGH RISK MEDICATION USE: ICD-10-CM

## 2020-05-18 DIAGNOSIS — Z12.11 SCREEN FOR COLON CANCER: ICD-10-CM

## 2020-05-18 PROBLEM — Z80.3 FAMILY HISTORY OF BREAST CANCER: Status: ACTIVE | Noted: 2020-05-18

## 2020-05-18 PROCEDURE — 99213 OFFICE O/P EST LOW 20 MIN: CPT | Performed by: NURSE PRACTITIONER

## 2020-05-18 RX ORDER — DULOXETIN HYDROCHLORIDE 60 MG/1
60 CAPSULE, DELAYED RELEASE ORAL 2 TIMES DAILY
Qty: 60 CAPSULE | Refills: 6 | Status: SHIPPED | OUTPATIENT
Start: 2020-05-18 | End: 2020-07-09 | Stop reason: SDUPTHER

## 2020-05-18 RX ORDER — ALPRAZOLAM 0.5 MG/1
TABLET ORAL
Qty: 30 TABLET | Refills: 0 | Status: SHIPPED | OUTPATIENT
Start: 2020-05-18 | End: 2021-05-19 | Stop reason: SDUPTHER

## 2020-05-18 RX ORDER — MIRTAZAPINE 15 MG/1
15 TABLET, FILM COATED ORAL
Qty: 30 TABLET | Refills: 6 | Status: SHIPPED | OUTPATIENT
Start: 2020-05-18 | End: 2020-06-10 | Stop reason: SDUPTHER

## 2020-05-18 RX ORDER — ALBUTEROL SULFATE 90 UG/1
2 AEROSOL, METERED RESPIRATORY (INHALATION) EVERY 4 HOURS PRN
Qty: 1 INHALER | Refills: 5 | Status: SHIPPED | OUTPATIENT
Start: 2020-05-18 | End: 2021-05-14

## 2020-05-18 NOTE — PROGRESS NOTES
Subjective   Ro Perez is a 60 y.o. female.     Follows up depression anxiety stable patient is taking Cymbalta as well as mirtazapine 15 mg at bedtime which helps sleep for insomnia  Rarely takes low-dose benzodiazepine Xanax  Once months prescription will last her entire year takes it for increased anxiety despite conservative measures and appropriate medications  She is had no problems with Xanax previous Raghav's have been appropriate  Controlled steps agreement has been intact    Appropriate safety measures    She has no history of hypertension no diabetes  She does have mixed hyperlipidemia  Controls with diet    Pap up-to-date  Mammogram up-to-date  Needs colonoscopy is never had 1 willing to get  Discussed Cologuard colonoscopy        obgyn oct       There were no vitals taken for this visit.      The following portions of the patient's history were reviewed and updated as appropriate: allergies, current medications, past family history, past medical history, past social history, past surgical history and problem list.    Review of Systems   Constitutional: Negative for chills, fatigue, fever and unexpected weight loss.   HENT: Negative.  Negative for trouble swallowing.    Eyes: Negative.    Respiratory: Negative for cough and shortness of breath.    Cardiovascular: Negative for chest pain, palpitations and leg swelling.   Gastrointestinal: Negative for abdominal pain and blood in stool.   Genitourinary: Negative.  Negative for pelvic pain.   Musculoskeletal: Negative.    Skin: Negative.    Neurological: Negative.  Negative for dizziness, speech difficulty, weakness and confusion.   Psychiatric/Behavioral: Negative.        Objective   Physical Exam   Constitutional: She is oriented to person, place, and time. She appears well-developed and well-nourished. No distress.   Pleasant appears well   HENT:   Head: Normocephalic.   Eyes: Pupils are equal, round, and reactive to light. Conjunctivae are normal.    Neck: Neck supple.   Cardiovascular: Exam reveals no friction rub.   No murmur heard.  Pulmonary/Chest: Effort normal. No respiratory distress. She has no wheezes.   Unlabored no distress   Musculoskeletal: She exhibits no edema.   Neurological: She is alert and oriented to person, place, and time.   Skin: Skin is warm and dry.   Psychiatric: She has a normal mood and affect. Her behavior is normal. Judgment and thought content normal.   Vitals reviewed.        Assessment/Plan   Diagnoses and all orders for this visit:    Anxiety  -     CBC & Differential; Future  -     Comprehensive Metabolic Panel; Future  -     Lipid Panel With LDL / HDL Ratio; Future  -     TSH Rfx On Abnormal To Free T4; Future  -     Urinalysis With Microscopic If Indicated (No Culture) - Urine, Clean Catch; Future  -     Vitamin D 25 Hydroxy; Future    Osteopenia, unspecified location  -     CBC & Differential; Future  -     Comprehensive Metabolic Panel; Future  -     Lipid Panel With LDL / HDL Ratio; Future  -     TSH Rfx On Abnormal To Free T4; Future  -     Urinalysis With Microscopic If Indicated (No Culture) - Urine, Clean Catch; Future  -     Vitamin D 25 Hydroxy; Future    Vitamin D deficiency  -     CBC & Differential; Future  -     Comprehensive Metabolic Panel; Future  -     Lipid Panel With LDL / HDL Ratio; Future  -     TSH Rfx On Abnormal To Free T4; Future  -     Urinalysis With Microscopic If Indicated (No Culture) - Urine, Clean Catch; Future  -     Vitamin D 25 Hydroxy; Future    Mixed hyperlipidemia  -     CBC & Differential; Future  -     Comprehensive Metabolic Panel; Future  -     Lipid Panel With LDL / HDL Ratio; Future  -     TSH Rfx On Abnormal To Free T4; Future  -     Urinalysis With Microscopic If Indicated (No Culture) - Urine, Clean Catch; Future  -     Vitamin D 25 Hydroxy; Future    High risk medication use  -     CBC & Differential; Future  -     Comprehensive Metabolic Panel; Future  -     Lipid Panel With  LDL / HDL Ratio; Future  -     TSH Rfx On Abnormal To Free T4; Future  -     Urinalysis With Microscopic If Indicated (No Culture) - Urine, Clean Catch; Future  -     Vitamin D 25 Hydroxy; Future    Screen for colon cancer  -     Ambulatory Referral For Screening Colonoscopy    Shortness of breath  -     XR Chest PA & Lateral    Other orders  -     mirtazapine (REMERON) 15 MG tablet; Take 1 tablet by mouth every night at bedtime.  -     DULoxetine (CYMBALTA) 60 MG capsule; Take 1 capsule by mouth 2 (Two) Times a Day.  -     albuterol sulfate  (90 Base) MCG/ACT inhaler; Inhale 2 puffs Every 4 (Four) Hours As Needed for Wheezing.  -     ALPRAZolam (Xanax) 0.5 MG tablet; One half to one tablet daily as needed for anxiety, caution sedation do not drive.      Outpatient lab  Continue present medication  She wants to continue present doses duloxetine mirtazapine  She could try decreasing mirtazapine by 50%  Generally upper for every 6-month follow-up patient prefers yearly  She has been stable for quite some time is difficult time in the office  Any changes in her mood promptly urgent recheck    Alprazolam I would need to see her for any refills and this is a rare refill only  Precautions alternatives potential sedation  Do not drive with this medication never mix with alcohol only as prescribed    Update controlled substance agreement next in person office visit    Telehealth visit video  20-minute grade 50% counseling            There are no Patient Instructions on file for this visit.

## 2020-06-09 ENCOUNTER — TELEPHONE (OUTPATIENT)
Dept: GASTROENTEROLOGY | Facility: CLINIC | Age: 60
End: 2020-06-09

## 2020-06-10 ENCOUNTER — RESULTS ENCOUNTER (OUTPATIENT)
Dept: FAMILY MEDICINE CLINIC | Facility: CLINIC | Age: 60
End: 2020-06-10

## 2020-06-10 DIAGNOSIS — Z79.899 HIGH RISK MEDICATION USE: ICD-10-CM

## 2020-06-10 DIAGNOSIS — E55.9 VITAMIN D DEFICIENCY: ICD-10-CM

## 2020-06-10 DIAGNOSIS — M85.80 OSTEOPENIA, UNSPECIFIED LOCATION: ICD-10-CM

## 2020-06-10 DIAGNOSIS — E78.2 MIXED HYPERLIPIDEMIA: ICD-10-CM

## 2020-06-10 DIAGNOSIS — F41.9 ANXIETY: ICD-10-CM

## 2020-06-10 RX ORDER — MIRTAZAPINE 15 MG/1
15 TABLET, FILM COATED ORAL
Qty: 90 TABLET | Refills: 1 | Status: SHIPPED | OUTPATIENT
Start: 2020-06-10 | End: 2020-11-30

## 2020-06-12 DIAGNOSIS — Z83.71 FAMILY HISTORY OF POLYPS IN THE COLON: Primary | ICD-10-CM

## 2020-06-12 RX ORDER — SODIUM CHLORIDE, SODIUM LACTATE, POTASSIUM CHLORIDE, CALCIUM CHLORIDE 600; 310; 30; 20 MG/100ML; MG/100ML; MG/100ML; MG/100ML
30 INJECTION, SOLUTION INTRAVENOUS CONTINUOUS
Status: CANCELLED | OUTPATIENT
Start: 2020-07-20

## 2020-06-17 PROBLEM — Z83.71 FAMILY HISTORY OF POLYPS IN THE COLON: Status: ACTIVE | Noted: 2020-06-17

## 2020-06-17 PROBLEM — Z83.719 FAMILY HISTORY OF POLYPS IN THE COLON: Status: ACTIVE | Noted: 2020-06-17

## 2020-07-09 RX ORDER — DULOXETIN HYDROCHLORIDE 60 MG/1
60 CAPSULE, DELAYED RELEASE ORAL 2 TIMES DAILY
Qty: 60 CAPSULE | Refills: 5 | Status: SHIPPED | OUTPATIENT
Start: 2020-07-09 | End: 2021-02-02

## 2020-07-13 ENCOUNTER — TRANSCRIBE ORDERS (OUTPATIENT)
Dept: SLEEP MEDICINE | Facility: HOSPITAL | Age: 60
End: 2020-07-13

## 2020-07-13 DIAGNOSIS — Z01.818 OTHER SPECIFIED PRE-OPERATIVE EXAMINATION: Primary | ICD-10-CM

## 2020-07-17 ENCOUNTER — LAB (OUTPATIENT)
Dept: LAB | Facility: HOSPITAL | Age: 60
End: 2020-07-17

## 2020-07-17 DIAGNOSIS — Z01.818 OTHER SPECIFIED PRE-OPERATIVE EXAMINATION: ICD-10-CM

## 2020-07-17 PROCEDURE — U0004 COV-19 TEST NON-CDC HGH THRU: HCPCS

## 2020-07-17 PROCEDURE — C9803 HOPD COVID-19 SPEC COLLECT: HCPCS

## 2020-07-18 LAB
REF LAB TEST METHOD: NORMAL
SARS-COV-2 RNA RESP QL NAA+PROBE: NOT DETECTED

## 2020-07-20 ENCOUNTER — HOSPITAL ENCOUNTER (OUTPATIENT)
Facility: HOSPITAL | Age: 60
Setting detail: HOSPITAL OUTPATIENT SURGERY
Discharge: HOME | End: 2020-07-20
Attending: INTERNAL MEDICINE | Admitting: INTERNAL MEDICINE

## 2020-07-20 ENCOUNTER — ANESTHESIA EVENT (OUTPATIENT)
Dept: GASTROENTEROLOGY | Facility: HOSPITAL | Age: 60
End: 2020-07-20

## 2020-07-20 ENCOUNTER — ANESTHESIA (OUTPATIENT)
Dept: GASTROENTEROLOGY | Facility: HOSPITAL | Age: 60
End: 2020-07-20

## 2020-07-20 VITALS
WEIGHT: 209 LBS | DIASTOLIC BLOOD PRESSURE: 83 MMHG | BODY MASS INDEX: 33.59 KG/M2 | HEIGHT: 66 IN | OXYGEN SATURATION: 97 % | RESPIRATION RATE: 17 BRPM | SYSTOLIC BLOOD PRESSURE: 151 MMHG | HEART RATE: 78 BPM | TEMPERATURE: 98.2 F

## 2020-07-20 DIAGNOSIS — K62.9 ANAL LESION: Primary | ICD-10-CM

## 2020-07-20 DIAGNOSIS — Z83.71 FAMILY HISTORY OF POLYPS IN THE COLON: ICD-10-CM

## 2020-07-20 PROCEDURE — 45380 COLONOSCOPY AND BIOPSY: CPT | Performed by: INTERNAL MEDICINE

## 2020-07-20 PROCEDURE — 25010000002 PROPOFOL 10 MG/ML EMULSION: Performed by: ANESTHESIOLOGY

## 2020-07-20 PROCEDURE — S0260 H&P FOR SURGERY: HCPCS | Performed by: INTERNAL MEDICINE

## 2020-07-20 PROCEDURE — 88305 TISSUE EXAM BY PATHOLOGIST: CPT | Performed by: INTERNAL MEDICINE

## 2020-07-20 RX ORDER — ONDANSETRON 2 MG/ML
4 INJECTION INTRAMUSCULAR; INTRAVENOUS ONCE AS NEEDED
Status: DISCONTINUED | OUTPATIENT
Start: 2020-07-20 | End: 2020-07-20 | Stop reason: HOSPADM

## 2020-07-20 RX ORDER — SODIUM CHLORIDE, SODIUM LACTATE, POTASSIUM CHLORIDE, CALCIUM CHLORIDE 600; 310; 30; 20 MG/100ML; MG/100ML; MG/100ML; MG/100ML
INJECTION, SOLUTION INTRAVENOUS CONTINUOUS PRN
Status: DISCONTINUED | OUTPATIENT
Start: 2020-07-20 | End: 2020-07-20

## 2020-07-20 RX ORDER — PROPOFOL 10 MG/ML
VIAL (ML) INTRAVENOUS AS NEEDED
Status: DISCONTINUED | OUTPATIENT
Start: 2020-07-20 | End: 2020-07-20 | Stop reason: SURG

## 2020-07-20 RX ORDER — PROPOFOL 10 MG/ML
VIAL (ML) INTRAVENOUS CONTINUOUS PRN
Status: DISCONTINUED | OUTPATIENT
Start: 2020-07-20 | End: 2020-07-20 | Stop reason: SURG

## 2020-07-20 RX ORDER — SODIUM CHLORIDE, SODIUM LACTATE, POTASSIUM CHLORIDE, CALCIUM CHLORIDE 600; 310; 30; 20 MG/100ML; MG/100ML; MG/100ML; MG/100ML
INJECTION, SOLUTION INTRAVENOUS CONTINUOUS PRN
Status: DISCONTINUED | OUTPATIENT
Start: 2020-07-20 | End: 2020-07-20 | Stop reason: SURG

## 2020-07-20 RX ORDER — LIDOCAINE HYDROCHLORIDE 20 MG/ML
INJECTION, SOLUTION INFILTRATION; PERINEURAL AS NEEDED
Status: DISCONTINUED | OUTPATIENT
Start: 2020-07-20 | End: 2020-07-20 | Stop reason: SURG

## 2020-07-20 RX ADMIN — PROPOFOL 100 MG: 10 INJECTION, EMULSION INTRAVENOUS at 14:46

## 2020-07-20 RX ADMIN — PROPOFOL 200 MCG/KG/MIN: 10 INJECTION, EMULSION INTRAVENOUS at 14:46

## 2020-07-20 RX ADMIN — SODIUM CHLORIDE, POTASSIUM CHLORIDE, SODIUM LACTATE AND CALCIUM CHLORIDE: 600; 310; 30; 20 INJECTION, SOLUTION INTRAVENOUS at 14:08

## 2020-07-20 RX ADMIN — LIDOCAINE HYDROCHLORIDE 60 MG: 20 INJECTION, SOLUTION INFILTRATION; PERINEURAL at 14:45

## 2020-07-20 NOTE — H&P
[facility], [facility]  Pre Procedure History & Physical    Chief Complaint: Colon cancer screening, family history of colon polyps      HPI: 61yo W with PMH as below here for screening colonoscopy.  No family history of GI malignancies, her mother had colon polyps.  Denies change in bowel habits, abdominal pain.  Otherwise feels well.      Past Medical History:   Past Medical History:   Diagnosis Date   • Anemia    • Benign breast lumps    • Bloating    • Depression    • Fatigue    • Hyperlipidemia    • Insomnia    • Shortness of breath    • Urgency incontinence    • Weight gain        Family History:  Family History   Problem Relation Age of Onset   • Osteoporosis Mother    • Cancer Mother    • Osteoporosis Maternal Grandmother    • No Known Problems Father    • No Known Problems Sister    • No Known Problems Daughter    • No Known Problems Daughter        Social History:   reports that she has been smoking cigarettes. She has a 10.00 pack-year smoking history. She has never used smokeless tobacco. She reports that she does not drink alcohol or use drugs.    Medications:   No medications prior to admission.       Allergies:  Codeine and Sulfa antibiotics    ROS:    Pertinent items are noted in HPI     Objective     There were no vitals taken for this visit.    Physical Exam   Constitutional: Pt is oriented to person, place, and time and well-developed, well-nourished, and in no distress.   HENT:   Mouth/Throat: Oropharynx is clear and moist.   Neck: Normal range of motion. Neck supple.   Cardiovascular: Normal rate, regular rhythm and normal heart sounds.    Pulmonary/Chest: Effort normal and breath sounds normal. No respiratory distress. No  wheezes.   Abdominal: Soft. Bowel sounds are normal.   Skin: Skin is warm and dry.   Psychiatric: Mood, memory, affect and judgment normal.     Assessment/Plan     Diagnosis: Colon cancer screening, family history of colon polyps      Anticipated Surgical  Procedure:    Colonoscopy    The risks, benefits, and alternatives of this procedure have been discussed with the patient or the responsible party- the patient understands and agrees to proceed.

## 2020-07-20 NOTE — DISCHARGE INSTRUCTIONS
For the next 24 hours patient needs to be with a responsible adult.    For 24 hours DO NOT drive, operate machinery, appliances, drink alcohol, make important decisions or sign legal documents.    Start with a light or bland diet if you are feeling sick to your stomach otherwise advance to regular diet as tolerated.    Follow recommendations on procedure report if provided by your doctor.    Call Dr Ruggiero for problems [phone]    Problems may include but not limited to: large amounts of bleeding, trouble breathing, repeated vomiting, severe unrelieved pain, fever or chills.

## 2020-07-20 NOTE — ANESTHESIA PREPROCEDURE EVALUATION
Anesthesia Evaluation     Patient summary reviewed and Nursing notes reviewed                Airway   Mallampati: I  TM distance: >3 FB  Neck ROM: full  No difficulty expected  Dental - normal exam     Pulmonary - normal exam   (+) a smoker Current, shortness of breath,   Cardiovascular - normal exam    (+) hyperlipidemia,       Neuro/Psych- negative ROS  GI/Hepatic/Renal/Endo - negative ROS     Musculoskeletal     Abdominal  - normal exam    Bowel sounds: normal.   Substance History - negative use     OB/GYN negative ob/gyn ROS         Other   arthritis,                      Anesthesia Plan    ASA 3     MAC       Anesthetic plan, all risks, benefits, and alternatives have been provided, discussed and informed consent has been obtained with: patient.

## 2020-07-22 LAB
CYTO UR: NORMAL
LAB AP CASE REPORT: NORMAL
PATH REPORT.FINAL DX SPEC: NORMAL
PATH REPORT.GROSS SPEC: NORMAL

## 2020-07-22 NOTE — PROGRESS NOTES
The polyps removed from her colon were benign hyperplastic polyps.    Follow-up with Dr. Ball as scheduled    Please place in 5-year colonoscopy recall

## 2020-07-23 ENCOUNTER — TELEPHONE (OUTPATIENT)
Dept: GASTROENTEROLOGY | Facility: CLINIC | Age: 60
End: 2020-07-23

## 2020-07-23 NOTE — TELEPHONE ENCOUNTER
Called pt and advised of Dr Ruggiero note. Pt verb understanding.     C/s placed in recall for 07/20/2025.

## 2020-07-23 NOTE — TELEPHONE ENCOUNTER
----- Message from Ashlie Ruggiero MD sent at 7/22/2020  5:58 PM EDT -----  The polyps removed from her colon were benign hyperplastic polyps.    Follow-up with Dr. Ball as scheduled    Please place in 5-year colonoscopy recall

## 2020-08-28 ENCOUNTER — OFFICE VISIT (OUTPATIENT)
Dept: SURGERY | Facility: CLINIC | Age: 60
End: 2020-08-28

## 2020-08-28 VITALS
HEIGHT: 66 IN | WEIGHT: 200 LBS | HEART RATE: 91 BPM | OXYGEN SATURATION: 95 % | SYSTOLIC BLOOD PRESSURE: 150 MMHG | TEMPERATURE: 97.6 F | BODY MASS INDEX: 32.14 KG/M2 | DIASTOLIC BLOOD PRESSURE: 90 MMHG

## 2020-08-28 DIAGNOSIS — K64.4 ANAL SKIN TAG: Primary | ICD-10-CM

## 2020-08-28 PROCEDURE — 99243 OFF/OP CNSLTJ NEW/EST LOW 30: CPT | Performed by: COLON & RECTAL SURGERY

## 2020-08-28 RX ORDER — CEFAZOLIN SODIUM 1 G/50ML
1 INJECTION, SOLUTION INTRAVENOUS ONCE
Status: CANCELLED | OUTPATIENT
Start: 2020-09-17 | End: 2020-08-28

## 2020-08-28 RX ORDER — SODIUM, POTASSIUM,MAG SULFATES 17.5-3.13G
SOLUTION, RECONSTITUTED, ORAL ORAL SEE ADMIN INSTRUCTIONS
COMMUNITY
Start: 2020-07-14 | End: 2020-08-28

## 2020-09-15 ENCOUNTER — APPOINTMENT (OUTPATIENT)
Dept: PREADMISSION TESTING | Facility: HOSPITAL | Age: 60
End: 2020-09-15

## 2020-09-15 VITALS
TEMPERATURE: 97.8 F | OXYGEN SATURATION: 97 % | WEIGHT: 212 LBS | BODY MASS INDEX: 33.27 KG/M2 | RESPIRATION RATE: 20 BRPM | DIASTOLIC BLOOD PRESSURE: 82 MMHG | HEART RATE: 81 BPM | HEIGHT: 67 IN | SYSTOLIC BLOOD PRESSURE: 144 MMHG

## 2020-09-15 LAB
ANION GAP SERPL CALCULATED.3IONS-SCNC: 7.4 MMOL/L (ref 5–15)
BUN SERPL-MCNC: 13 MG/DL (ref 8–23)
BUN/CREAT SERPL: 14.6 (ref 7–25)
CALCIUM SPEC-SCNC: 9.6 MG/DL (ref 8.6–10.5)
CHLORIDE SERPL-SCNC: 99 MMOL/L (ref 98–107)
CO2 SERPL-SCNC: 30.6 MMOL/L (ref 22–29)
CREAT SERPL-MCNC: 0.89 MG/DL (ref 0.57–1)
DEPRECATED RDW RBC AUTO: 43.7 FL (ref 37–54)
ERYTHROCYTE [DISTWIDTH] IN BLOOD BY AUTOMATED COUNT: 13.5 % (ref 12.3–15.4)
GFR SERPL CREATININE-BSD FRML MDRD: 64 ML/MIN/1.73
GLUCOSE SERPL-MCNC: 74 MG/DL (ref 65–99)
HCT VFR BLD AUTO: 40.6 % (ref 34–46.6)
HGB BLD-MCNC: 13.1 G/DL (ref 12–15.9)
MCH RBC QN AUTO: 28.7 PG (ref 26.6–33)
MCHC RBC AUTO-ENTMCNC: 32.3 G/DL (ref 31.5–35.7)
MCV RBC AUTO: 88.8 FL (ref 79–97)
PLATELET # BLD AUTO: 297 10*3/MM3 (ref 140–450)
PMV BLD AUTO: 10.7 FL (ref 6–12)
POTASSIUM SERPL-SCNC: 4.3 MMOL/L (ref 3.5–5.2)
RBC # BLD AUTO: 4.57 10*6/MM3 (ref 3.77–5.28)
SODIUM SERPL-SCNC: 137 MMOL/L (ref 136–145)
WBC # BLD AUTO: 10.4 10*3/MM3 (ref 3.4–10.8)

## 2020-09-15 PROCEDURE — 80048 BASIC METABOLIC PNL TOTAL CA: CPT | Performed by: COLON & RECTAL SURGERY

## 2020-09-15 PROCEDURE — 36415 COLL VENOUS BLD VENIPUNCTURE: CPT

## 2020-09-15 PROCEDURE — C9803 HOPD COVID-19 SPEC COLLECT: HCPCS | Performed by: COLON & RECTAL SURGERY

## 2020-09-15 PROCEDURE — 85027 COMPLETE CBC AUTOMATED: CPT | Performed by: COLON & RECTAL SURGERY

## 2020-09-15 PROCEDURE — U0004 COV-19 TEST NON-CDC HGH THRU: HCPCS | Performed by: COLON & RECTAL SURGERY

## 2020-09-15 PROCEDURE — 93010 ELECTROCARDIOGRAM REPORT: CPT | Performed by: INTERNAL MEDICINE

## 2020-09-15 PROCEDURE — 93005 ELECTROCARDIOGRAM TRACING: CPT

## 2020-09-15 RX ORDER — MELATONIN
1000 DAILY
COMMUNITY

## 2020-09-15 NOTE — DISCHARGE INSTRUCTIONS
Take the following medications the morning of surgery:    INHALER, XANAX, DULOXETINE    ARRIVE TO MAIN SURGERY AT 8:30 AM ON 9/17/2020    If you are on prescription narcotic pain medication to control your pain you may also take that medication the morning of surgery.    General Instructions:  • Do not eat solid food after midnight the night before surgery.  • You may drink clear liquids day of surgery but must stop at least one hour before your hospital arrival time.  • It is beneficial for you to have a clear drink that contains carbohydrates the day of surgery.  We suggest a 12 to 20 ounce bottle of Gatorade or Powerade for non-diabetic patients or a 12 to 20 ounce bottle of G2 or Powerade Zero for diabetic patients. (Pediatric patients, are not advised to drink a 12 to 20 ounce carbohydrate drink)    Clear liquids are liquids you can see through.  Nothing red in color.     Plain water                               Sports drinks  Sodas                                   Gelatin (Jell-O)  Fruit juices without pulp such as white grape juice and apple juice  Popsicles that contain no fruit or yogurt  Tea or coffee (no cream or milk added)  Gatorade / Powerade  G2 / Powerade Zero    • Infants may have breast milk up to four hours before surgery.  • Infants drinking formula may drink formula up to six hours before surgery.   • Patients who avoid smoking, chewing tobacco and alcohol for 4 weeks prior to surgery have a reduced risk of post-operative complications.  Quit smoking as many days before surgery as you can.  • Do not smoke, use chewing tobacco or drink alcohol the day of surgery.   • If applicable bring your C-PAP/ BI-PAP machine.  • Bring any papers given to you in the doctor’s office.  • Wear clean comfortable clothes.  • Do not wear contact lenses, false eyelashes or make-up.  Bring a case for your glasses.   • Bring crutches or walker if applicable.  • Remove all piercings.  Leave jewelry and any other  valuables at home.  • Hair extensions with metal clips must be removed prior to surgery.  • The Pre-Admission Testing nurse will instruct you to bring medications if unable to obtain an accurate list in Pre-Admission Testing.        Preventing a Surgical Site Infection:  • For 2 to 3 days before surgery, avoid shaving with a razor because the razor can irritate skin and make it easier to develop an infection.    • Any areas of open skin can increase the risk of a post-operative wound infection by allowing bacteria to enter and travel throughout the body.  Notify your surgeon if you have any skin wounds / rashes even if it is not near the expected surgical site.  The area will need assessed to determine if surgery should be delayed until it is healed.  • The night prior to surgery shower using a fresh bar of anti-bacterial soap (such as Dial) and clean washcloth.  Sleep in a clean bed with clean clothing.  Do not allow pets to sleep with you.  • Shower on the morning of surgery using a fresh bar of anti-bacterial soap (such as Dial) and clean washcloth.  Dry with a clean towel and dress in clean clothing.  • Ask your surgeon if you will be receiving antibiotics prior to surgery.  • Make sure you, your family, and all healthcare providers clean their hands with soap and water or an alcohol based hand  before caring for you or your wound.    Day of surgery:  Your arrival time is approximately two hours before your scheduled surgery time.  Upon arrival, a Pre-op nurse and Anesthesiologist will review your health history, obtain vital signs, and answer questions you may have.  The only belongings needed at this time will be a list of your home medications and if applicable your C-PAP/BI-PAP machine.  If you are staying overnight your family can leave the rest of your belongings in the car and bring them to your room later.  A Pre-op nurse will start an IV and you may receive medication in preparation for  surgery, including something to help you relax.  Your family will be able to see you in the Pre-op area.  Two visitors at a time will be allowed in the Pre-op room.  While you are in surgery your family should notify the waiting room  if they leave the waiting room area and provide a contact phone number.    Please be aware that surgery does come with discomfort.  We want to make every effort to control your discomfort so please discuss any uncontrolled symptoms with your nurse.   Your doctor will most likely have prescribed pain medications.      If you are going home after surgery you will receive individualized written care instructions before being discharged.  A responsible adult must drive you to and from the hospital on the day of your surgery and stay with you for 24 hours.    If you are staying overnight following surgery, you will be transported to your hospital room following the recovery period.  [facility] has all private rooms.    If you have any questions please call Pre-Admission Testing at [phone].  Deductibles and co-payments are collected on the day of service. Please be prepared to pay the required co-pay, deductible or deposit on the day of service as defined by your plan.    Patient Education for Self-Quarantine Process    Following your COVID testing, we strongly recommend that you do not leave your home after you have been tested for COVID except to get medical care. This includes not going to work, school or to public areas.  If this is not possible for you to do please limit your activities to only required outings.  Be sure to wear a mask when you are with other people, practice social distancing and wash your hands frequently.      The following items provide additional details to keep you safe.  • Wash your hands with soap and water frequently for at least 20 seconds.   • Avoid touching your eyes, nose and mouth with unwashed hands.  • Do not share  anything - utensils, towels, food from the same bowl.   • Have your own utensils, drinking glass, dishes, towels and bedding.   • Do not have visitors.   • Do use FaceTime to stay in touch with family and friends.  • You should stay in a specific room away from others if possible.   • Stay at least 6 feet away from others in the home if you cannot have a dedicated room to yourself.   • Do not snuggle with your pet. While the CDC says there is no evidence that pets can spread COVID-19 or be infected from humans, it is probably best to avoid “petting, snuggling, being kissed or licked and sharing food (during self-quarantine)”, according to the CDC.   • Sanitize household surfaces daily. Include all high touch areas (door handles, light switches, phones, countertops, etc.)  • Do not share a bathroom with others, if possible.   • Wear a mask around others in your home if you are unable to stay in a separate room or 6 feet apart. If  you are unable to wear a mask, have your family member wear a mask if they must be within 6 feet of you.   Call your surgeon immediately if you experience any of the following symptoms:  • Sore Throat  • Shortness of Breath or difficulty breathing  • Cough  • Chills  • Body soreness or muscle pain  • Headache  • Fever  • New loss of taste or smell  • Do not arrive for your surgery ill.  Your procedure will need to be rescheduled to another time.  You will need to call your physician before the day of surgery to avoid any unnecessary exposure to hospital staff as well as other patients.    CHLORHEXIDINE CLOTH INSTRUCTIONS  The morning of surgery follow these instructions using the Chlorhexidine cloths you've been given.  These steps reduce bacteria on the body.  Do not use the cloths near your eyes, ears mouth, genitalia or on open wounds.  Throw the cloths away after use but do not try to flush them down a toilet.      • Open and remove one cloth at a time from the package.    • Leave the  cloth unfolded and begin the bathing.  • Massage the skin with the cloths using gentle pressure to remove bacteria.  Do not scrub harshly.   • Follow the steps below with one 2% CHG cloth per area (6 total cloths).  • One cloth for neck, shoulders and chest.  • One cloth for both arms, hands, fingers and underarms (do underarms last).  • One cloth for the abdomen followed by groin.  • One cloth for right leg and foot including between the toes.  • One cloth for left leg and foot including between the toes.  • The last cloth is to be used for the back of the neck, back and buttocks.    Allow the CHG to air dry 3 minutes on the skin which will give it time to work and decrease the chance of irritation.  The skin may feel sticky until it is dry.  Do not rinse with water or any other liquid or you will lose the beneficial effects of the CHG.  If mild skin irritation occurs, do rinse the skin to remove the CHG.  Report this to the nurse at time of admission.  Do not apply lotions, creams, ointments, deodorants or perfumes after using the clothes. Dress in clean clothes before coming to the hospital.

## 2020-09-16 ENCOUNTER — TELEPHONE (OUTPATIENT)
Dept: SURGERY | Facility: CLINIC | Age: 60
End: 2020-09-16

## 2020-09-16 LAB — SARS-COV-2 RNA RESP QL NAA+PROBE: NOT DETECTED

## 2020-09-16 NOTE — TELEPHONE ENCOUNTER
LA paperwork has been filled out, signed, faxed, scanned in chart and mailed to patient on 09/17/2020.    Thank you.

## 2020-09-17 ENCOUNTER — ANESTHESIA EVENT (OUTPATIENT)
Dept: PERIOP | Facility: HOSPITAL | Age: 60
End: 2020-09-17

## 2020-09-17 ENCOUNTER — ANESTHESIA (OUTPATIENT)
Dept: PERIOP | Facility: HOSPITAL | Age: 60
End: 2020-09-17

## 2020-09-17 ENCOUNTER — HOSPITAL ENCOUNTER (OUTPATIENT)
Facility: HOSPITAL | Age: 60
Setting detail: HOSPITAL OUTPATIENT SURGERY
Discharge: HOME | End: 2020-09-17
Attending: COLON & RECTAL SURGERY | Admitting: COLON & RECTAL SURGERY

## 2020-09-17 VITALS
BODY MASS INDEX: 32.85 KG/M2 | HEART RATE: 84 BPM | WEIGHT: 209.31 LBS | DIASTOLIC BLOOD PRESSURE: 75 MMHG | SYSTOLIC BLOOD PRESSURE: 104 MMHG | RESPIRATION RATE: 16 BRPM | OXYGEN SATURATION: 92 % | TEMPERATURE: 98 F | HEIGHT: 67 IN

## 2020-09-17 DIAGNOSIS — K64.4 ANAL SKIN TAG: ICD-10-CM

## 2020-09-17 PROCEDURE — 25010000002 PROPOFOL 10 MG/ML EMULSION: Performed by: NURSE ANESTHETIST, CERTIFIED REGISTERED

## 2020-09-17 PROCEDURE — 25010000002 MIDAZOLAM PER 1 MG: Performed by: ANESTHESIOLOGY

## 2020-09-17 PROCEDURE — 25010000002 KETOROLAC TROMETHAMINE PER 15 MG: Performed by: NURSE ANESTHETIST, CERTIFIED REGISTERED

## 2020-09-17 PROCEDURE — 25010000002 FENTANYL CITRATE (PF) 100 MCG/2ML SOLUTION: Performed by: ANESTHESIOLOGY

## 2020-09-17 PROCEDURE — 25010000002 CEFAZOLIN 1-4 GM/50ML-% SOLUTION: Performed by: COLON & RECTAL SURGERY

## 2020-09-17 PROCEDURE — 46910 DESTRUCTION ANAL LESION(S): CPT | Performed by: COLON & RECTAL SURGERY

## 2020-09-17 PROCEDURE — 46922 EXCISION OF ANAL LESION(S): CPT | Performed by: COLON & RECTAL SURGERY

## 2020-09-17 PROCEDURE — 88304 TISSUE EXAM BY PATHOLOGIST: CPT | Performed by: COLON & RECTAL SURGERY

## 2020-09-17 RX ORDER — SODIUM CHLORIDE 0.9 % (FLUSH) 0.9 %
3 SYRINGE (ML) INJECTION EVERY 12 HOURS SCHEDULED
Status: DISCONTINUED | OUTPATIENT
Start: 2020-09-17 | End: 2020-09-17 | Stop reason: HOSPADM

## 2020-09-17 RX ORDER — MIDAZOLAM HYDROCHLORIDE 1 MG/ML
1 INJECTION INTRAMUSCULAR; INTRAVENOUS
Status: DISCONTINUED | OUTPATIENT
Start: 2020-09-17 | End: 2020-09-17 | Stop reason: HOSPADM

## 2020-09-17 RX ORDER — ONDANSETRON 4 MG/1
4 TABLET, FILM COATED ORAL ONCE AS NEEDED
Status: DISCONTINUED | OUTPATIENT
Start: 2020-09-17 | End: 2020-09-17 | Stop reason: HOSPADM

## 2020-09-17 RX ORDER — LIDOCAINE HYDROCHLORIDE 10 MG/ML
0.5 INJECTION, SOLUTION EPIDURAL; INFILTRATION; INTRACAUDAL; PERINEURAL ONCE AS NEEDED
Status: DISCONTINUED | OUTPATIENT
Start: 2020-09-17 | End: 2020-09-17 | Stop reason: HOSPADM

## 2020-09-17 RX ORDER — ACETIC ACID 3 %
LIQUID (ML) MISCELLANEOUS AS NEEDED
Status: DISCONTINUED | OUTPATIENT
Start: 2020-09-17 | End: 2020-09-17 | Stop reason: HOSPADM

## 2020-09-17 RX ORDER — SODIUM CHLORIDE 0.9 % (FLUSH) 0.9 %
3-10 SYRINGE (ML) INJECTION AS NEEDED
Status: DISCONTINUED | OUTPATIENT
Start: 2020-09-17 | End: 2020-09-17 | Stop reason: HOSPADM

## 2020-09-17 RX ORDER — AMOXICILLIN 500 MG/1
1000 CAPSULE ORAL 3 TIMES DAILY
COMMUNITY
End: 2020-09-28

## 2020-09-17 RX ORDER — SODIUM CHLORIDE, SODIUM LACTATE, POTASSIUM CHLORIDE, CALCIUM CHLORIDE 600; 310; 30; 20 MG/100ML; MG/100ML; MG/100ML; MG/100ML
9 INJECTION, SOLUTION INTRAVENOUS CONTINUOUS
Status: DISCONTINUED | OUTPATIENT
Start: 2020-09-17 | End: 2020-09-17 | Stop reason: HOSPADM

## 2020-09-17 RX ORDER — FAMOTIDINE 10 MG/ML
20 INJECTION, SOLUTION INTRAVENOUS ONCE
Status: DISCONTINUED | OUTPATIENT
Start: 2020-09-17 | End: 2020-09-17 | Stop reason: HOSPADM

## 2020-09-17 RX ORDER — ACETAMINOPHEN 325 MG/1
650 TABLET ORAL EVERY 6 HOURS PRN
Status: DISCONTINUED | OUTPATIENT
Start: 2020-09-17 | End: 2020-09-17 | Stop reason: HOSPADM

## 2020-09-17 RX ORDER — FENTANYL CITRATE 50 UG/ML
50 INJECTION, SOLUTION INTRAMUSCULAR; INTRAVENOUS
Status: DISCONTINUED | OUTPATIENT
Start: 2020-09-17 | End: 2020-09-17 | Stop reason: HOSPADM

## 2020-09-17 RX ORDER — PROPOFOL 10 MG/ML
VIAL (ML) INTRAVENOUS CONTINUOUS PRN
Status: DISCONTINUED | OUTPATIENT
Start: 2020-09-17 | End: 2020-09-17 | Stop reason: SURG

## 2020-09-17 RX ORDER — MAGNESIUM HYDROXIDE 1200 MG/15ML
LIQUID ORAL AS NEEDED
Status: DISCONTINUED | OUTPATIENT
Start: 2020-09-17 | End: 2020-09-17 | Stop reason: HOSPADM

## 2020-09-17 RX ORDER — CEFAZOLIN SODIUM 1 G/50ML
1 INJECTION, SOLUTION INTRAVENOUS ONCE
Status: COMPLETED | OUTPATIENT
Start: 2020-09-17 | End: 2020-09-17

## 2020-09-17 RX ORDER — FAMOTIDINE 10 MG/ML
20 INJECTION, SOLUTION INTRAVENOUS ONCE
Status: COMPLETED | OUTPATIENT
Start: 2020-09-17 | End: 2020-09-17

## 2020-09-17 RX ORDER — LIDOCAINE 50 MG/G
OINTMENT TOPICAL EVERY 4 HOURS PRN
Qty: 1 TUBE | Refills: 5 | Status: SHIPPED | OUTPATIENT
Start: 2020-09-17 | End: 2020-09-28

## 2020-09-17 RX ORDER — KETOROLAC TROMETHAMINE 30 MG/ML
INJECTION, SOLUTION INTRAMUSCULAR; INTRAVENOUS AS NEEDED
Status: DISCONTINUED | OUTPATIENT
Start: 2020-09-17 | End: 2020-09-17 | Stop reason: SURG

## 2020-09-17 RX ADMIN — FENTANYL CITRATE 25 MCG: 50 INJECTION INTRAMUSCULAR; INTRAVENOUS at 09:59

## 2020-09-17 RX ADMIN — FAMOTIDINE 20 MG: 10 INJECTION, SOLUTION INTRAVENOUS at 08:37

## 2020-09-17 RX ADMIN — CEFAZOLIN SODIUM 1 G: 1 INJECTION, SOLUTION INTRAVENOUS at 10:03

## 2020-09-17 RX ADMIN — MIDAZOLAM 2 MG: 1 INJECTION INTRAMUSCULAR; INTRAVENOUS at 09:58

## 2020-09-17 RX ADMIN — SODIUM CHLORIDE, POTASSIUM CHLORIDE, SODIUM LACTATE AND CALCIUM CHLORIDE 9 ML/HR: 600; 310; 30; 20 INJECTION, SOLUTION INTRAVENOUS at 08:30

## 2020-09-17 RX ADMIN — PROPOFOL 120 MCG/KG/MIN: 10 INJECTION, EMULSION INTRAVENOUS at 10:00

## 2020-09-17 RX ADMIN — KETOROLAC TROMETHAMINE 30 MG: 30 INJECTION, SOLUTION INTRAMUSCULAR; INTRAVENOUS at 10:18

## 2020-09-17 RX ADMIN — FENTANYL CITRATE 25 MCG: 50 INJECTION INTRAMUSCULAR; INTRAVENOUS at 10:06

## 2020-09-17 RX ADMIN — FENTANYL CITRATE 25 MCG: 50 INJECTION INTRAMUSCULAR; INTRAVENOUS at 10:13

## 2020-09-17 NOTE — ANESTHESIA PREPROCEDURE EVALUATION
Anesthesia Evaluation     Patient summary reviewed   no history of anesthetic complications:  NPO Solid Status: > 8 hours  NPO Liquid Status: > 2 hours           Airway   Mallampati: I  TM distance: >3 FB  Neck ROM: full  Dental          Pulmonary     breath sounds clear to auscultation  (+) a smoker Current Abstained day of surgery,   (-) shortness of breath  Cardiovascular   Exercise tolerance: good (4-7 METS)    ECG reviewed  Rhythm: regular  Rate: normal    (+) hyperlipidemia,   (-) angina, DIAZ      Neuro/Psych  (+) psychiatric history Anxiety and Depression,     GI/Hepatic/Renal/Endo    (+)   renal disease,     Musculoskeletal     Abdominal    Substance History      OB/GYN          Other   arthritis,                      Anesthesia Plan    ASA 2     MAC   (MAC anesthesia discussed with patient and/or patient representative. Risks (including but not limited to intra-op awareness), benefits, and alternatives were discussed. Understanding was voiced with an agreement to proceed with a MAC technique and General as a backup option.   )  intravenous induction     Anesthetic plan, all risks, benefits, and alternatives have been provided, discussed and informed consent has been obtained with: patient.

## 2020-09-17 NOTE — ANESTHESIA POSTPROCEDURE EVALUATION
"Patient: Ro Perez    Procedure Summary     Date: 09/17/20 Room / Location: [facility] OR  / [facility] MAIN OR    Anesthesia Start: 0954 Anesthesia Stop: 1034    Procedure: RIGHT POSTERIOR ANAL TAG EXCISION (N/A Anus) Diagnosis:       Anal skin tag      (Anal skin tag [K64.4])    Surgeon: Tessa Ball MD Provider: Oniel Cerda MD    Anesthesia Type: MAC ASA Status: 2          Anesthesia Type: MAC    Vitals  Vitals Value Taken Time   /75 09/17/20 1045   Temp 36.7 °C (98 °F) 09/17/20 1030   Pulse 89 09/17/20 1030   Resp 16 09/17/20 1030   SpO2 90 % 09/17/20 1050   Vitals shown include unvalidated device data.        Post Anesthesia Care and Evaluation    Patient location during evaluation: bedside  Patient participation: complete - patient participated  Level of consciousness: awake and alert  Pain management: adequate  Airway patency: patent  Anesthetic complications: No anesthetic complications    Cardiovascular status: acceptable  Respiratory status: acceptable  Hydration status: acceptable    Comments: /76   Pulse 89   Temp 36.7 °C (98 °F) (Oral)   Resp 16   Ht 170.2 cm (67\")   Wt 94.9 kg (209 lb 5 oz)   SpO2 93%   BMI 32.78 kg/m²       "

## 2020-09-17 NOTE — BRIEF OP NOTE
HEMORRHOIDECTOMY  Progress Note    Ro Perez  9/17/2020    Pre-op Diagnosis:   Anal skin tag [K64.4]       Post-Op Diagnosis Codes:     * Anal skin tag [K64.4]    Procedure/CPT® Codes:        Procedure(s):  HEMORRHOIDECTOMY    Surgeon(s):  Tessa Ball MD    Anesthesia: Monitored Anesthesia Care    Staff:   Circulator: Cheryl Lombardo RN  Scrub Person: Marguerite Hernandez         Estimated Blood Loss: minimal    Urine Voided: * No values recorded between 9/17/2020  9:54 AM and 9/17/2020 10:25 AM *    Specimens:                Specimens     ID Source Type Tests Collected By Collected At Frozen?      A Anus Tissue · TISSUE PATHOLOGY EXAM   Tessa Ball MD 9/17/20 1016 No     Description: RIGHT POSTERIOR ANAL TAG, HPV TEST    This specimen was not marked as sent.                Drains: * No LDAs found *    Findings:    Complications:           Tessa Ball MD     Date: 9/17/2020  Time: 10:25 EDT

## 2020-09-17 NOTE — DISCHARGE INSTRUCTIONS
Dr. Tessa Ball  [address]  [place] [address]  [phone]      Discharge Instructions for Hemorrhoidectomy/Anal Fissures/Fistulas      1. Go home, rest and take it easy today; however, you should get up and move about several times today to reduce the risk of developing a clot in your legs.      2. You may experience some dizziness or memory loss from the anesthesia.  This may last for the next 24 hours.  Someone should plan on staying with you for the first 24 hours for your safety.    3. Do not make any important legal decisions or sign any legal papers for the next 24 hours.      4. Eat and drink lightly today.  Start off with liquids, jello, soup, crackers or other bland foods at first. Please drink plenty of fluids. You may advance your diet tomorrow as tolerated as long as you do not experience any nausea or vomiting.     5. Some patients will have packing in their rectum.  It should come out will your first bowel movement.  You may remove it sooner yourself if it bothers you.  If it comes out on its own before your first bowel movement, do not worry about it.  It does not need to be replaced.      6. Begin your sitz baths tomorrow.    7. The best method of pain relief is a sitz bath (sitting in a tub or warm water) at least 3 times daily for 10 minutes.  This helps to reduce pain and aids with hygiene/drainage.  The drainage may have an unpleasant odor.  This is not unexpected and should be controlled with baths and showers.  If the skin around the anal area becomes irritated, you may apply Vaseline, A&D ointment or a similar barrier cream to the area.       8. Bleeding and drainage are to be expected and may persist for as long as 2-4 weeks.  Bleeding may occur with your bowel movements as well.  Wear a cotton liner such as a Kotex pad or a panty liner inside your underwear to protect your clothing.       9. You have received a prescription for a narcotic pain medicine, as you will  have pain following surgery.   You will not be totally pain free, but your pain medicine should make the pain tolerable.  Please take your pain medicine as prescribed and always take your pills with food to prevent nausea. Your pain may persist for 1-3 weeks. If you are having severe pain that cannot be controlled by the pain medicine, please contact me.  Typically, patients with anal fissures will have less pain than those with hemorrhoids.      10. The goal is for your bowel movements to be soft which will help to minimize pain.  The pain medicine used to keep your comfortable may also cause some constipation so I recommend the following:    • Miralax (17 grams)--1 capfull every day starting the day after surgery.    • Keep taking fiber everyday (Citrucel, Metamucil, or Fiber-con) as directed.    11. If you are unable to have a bowel movement by 2 days after surgery, try Milk of Magnesia, Magnesium Citrate, or Colace.  If still unable to have a bowel movement, call the office at [phone]      12. No driving for 24 hours and for as long as you are taking your prescription pain medicine.  You may resume your activities gradually.       13. You will need to call the office at [phone] to schedule a follow-up appointment in 10-21 days.    14. Remember to contact me for any of the following:    • Fever > 100.5 degrees  • Severe pain that cannot be controlled by taking your pain pills  • Severe nausea or vomiting   • Significant bleeding > 1/4 cup  • Any other questions or concerns

## 2020-09-17 NOTE — OP NOTE
09/17/20    Surgeon: Tessa Ball MD    Preoperative diagnosis: Anal skin tag [K64.4]    Post-Op Diagnosis Codes:     * Anal skin tag [K64.4]    Procedure: Excision of anal mass* Panel 2 does not exist *    Estimated Blood Loss: minimal    Specimens:   Specimens     ID Source Type Tests Collected By Collected At Frozen?      A Anus Tissue · TISSUE PATHOLOGY EXAM   Tessa Ball MD 9/17/20 1016 No     Description: RIGHT POSTERIOR ANAL TAG, HPV TEST    This specimen was not marked as sent.         * No orders in the log *    Indication:  Ro Perez is a 60 y.o. female who comes in with Anal skin tag  Patient understands risks, benefits,and alternatives wishes to proceed.      Procedure Details:  The patient was brought into the operating room.  SCDs in place.  Antibiotics infused.  After general anesthesia was achieved, the patient was placed prone on the operating room table.  Buttocks were effaced with tape.  She was prepped and draped in sterile fashion.  Posteriorly on the right there was a very large anal tag.  It was about 3.5 cm long x 3 cm wide.  There were some condyloma-like areas at the end of it.  I was able to inject the patient with 1% lidocaine with epinephrine and 0.25% Marcaine without.  I did a perineum block and a local anal block.  I was able to get at the base of the tag in order to help with pain control after surgery.  I used a scalpel to excise the tag at its base.  I used electrocautery to control any bleeding.  I then closed the incision with 2-0 Vicryl in an interrupted fashion.  I examined the rest of the anal canal.  She had a small little area on the left lateral side that it looked like it could have been a condyloma so I fulgurated it.  All instrument, lap counts and needle counts were correct.  The patient was stable throughout the entire case and was taken to recovery.

## 2020-09-25 LAB
CYTO UR: NORMAL
LAB AP CASE REPORT: NORMAL
LAB AP DIAGNOSIS COMMENT: NORMAL
PATH REPORT.ADDENDUM SPEC: NORMAL
PATH REPORT.FINAL DX SPEC: NORMAL
PATH REPORT.GROSS SPEC: NORMAL

## 2020-09-28 ENCOUNTER — OFFICE VISIT (OUTPATIENT)
Dept: SURGERY | Facility: CLINIC | Age: 60
End: 2020-09-28

## 2020-09-28 VITALS
WEIGHT: 212.3 LBS | HEART RATE: 92 BPM | DIASTOLIC BLOOD PRESSURE: 90 MMHG | TEMPERATURE: 97 F | HEIGHT: 66 IN | OXYGEN SATURATION: 100 % | SYSTOLIC BLOOD PRESSURE: 130 MMHG | BODY MASS INDEX: 34.12 KG/M2

## 2020-09-28 DIAGNOSIS — K64.4 ANAL SKIN TAG: Primary | ICD-10-CM

## 2020-09-28 PROCEDURE — 99024 POSTOP FOLLOW-UP VISIT: CPT | Performed by: COLON & RECTAL SURGERY

## 2020-09-28 RX ORDER — HYDROCODONE BITARTRATE AND ACETAMINOPHEN 5; 325 MG/1; MG/1
TABLET ORAL
COMMUNITY
Start: 2020-09-09 | End: 2020-09-28

## 2020-09-28 RX ORDER — CHLORHEXIDINE GLUCONATE 0.12 MG/ML
RINSE ORAL
COMMUNITY
Start: 2020-09-09 | End: 2021-05-19

## 2020-11-30 RX ORDER — MIRTAZAPINE 15 MG/1
TABLET, FILM COATED ORAL
Qty: 90 TABLET | Refills: 1 | Status: SHIPPED | OUTPATIENT
Start: 2020-11-30 | End: 2021-05-14

## 2021-01-13 ENCOUNTER — IMMUNIZATION (OUTPATIENT)
Dept: VACCINE CLINIC | Facility: HOSPITAL | Age: 61
End: 2021-01-13

## 2021-01-13 PROCEDURE — 0001A: CPT | Performed by: INTERNAL MEDICINE

## 2021-01-13 PROCEDURE — 91300 HC SARSCOV02 VAC 30MCG/0.3ML IM: CPT | Performed by: INTERNAL MEDICINE

## 2021-02-02 RX ORDER — DULOXETIN HYDROCHLORIDE 60 MG/1
CAPSULE, DELAYED RELEASE ORAL
Qty: 180 CAPSULE | Refills: 0 | Status: SHIPPED | OUTPATIENT
Start: 2021-02-02 | End: 2021-04-27

## 2021-02-03 ENCOUNTER — IMMUNIZATION (OUTPATIENT)
Dept: VACCINE CLINIC | Facility: HOSPITAL | Age: 61
End: 2021-02-03

## 2021-02-03 PROCEDURE — 0002A: CPT | Performed by: INTERNAL MEDICINE

## 2021-02-03 PROCEDURE — 91300 HC SARSCOV02 VAC 30MCG/0.3ML IM: CPT | Performed by: INTERNAL MEDICINE

## 2021-04-27 ENCOUNTER — TRANSCRIBE ORDERS (OUTPATIENT)
Dept: FAMILY MEDICINE CLINIC | Facility: CLINIC | Age: 61
End: 2021-04-27

## 2021-04-27 DIAGNOSIS — Z12.31 SCREENING MAMMOGRAM, ENCOUNTER FOR: Primary | ICD-10-CM

## 2021-04-27 RX ORDER — DULOXETIN HYDROCHLORIDE 60 MG/1
CAPSULE, DELAYED RELEASE ORAL
Qty: 180 CAPSULE | Refills: 0 | Status: SHIPPED | OUTPATIENT
Start: 2021-04-27 | End: 2021-07-20 | Stop reason: SDUPTHER

## 2021-05-14 RX ORDER — ALBUTEROL SULFATE 90 UG/1
AEROSOL, METERED RESPIRATORY (INHALATION)
Qty: 8.5 G | Refills: 2 | Status: SHIPPED | OUTPATIENT
Start: 2021-05-14 | End: 2021-07-21

## 2021-05-14 RX ORDER — MIRTAZAPINE 15 MG/1
TABLET, FILM COATED ORAL
Qty: 90 TABLET | Refills: 1 | Status: SHIPPED | OUTPATIENT
Start: 2021-05-14 | End: 2021-07-20 | Stop reason: SDUPTHER

## 2021-05-19 ENCOUNTER — OFFICE VISIT (OUTPATIENT)
Dept: FAMILY MEDICINE CLINIC | Facility: CLINIC | Age: 61
End: 2021-05-19

## 2021-05-19 VITALS
TEMPERATURE: 96.9 F | HEART RATE: 100 BPM | DIASTOLIC BLOOD PRESSURE: 88 MMHG | BODY MASS INDEX: 34.15 KG/M2 | WEIGHT: 212.5 LBS | SYSTOLIC BLOOD PRESSURE: 157 MMHG | OXYGEN SATURATION: 90 % | HEIGHT: 66 IN

## 2021-05-19 DIAGNOSIS — L65.9 ALOPECIA: ICD-10-CM

## 2021-05-19 DIAGNOSIS — G89.29 CHRONIC LEFT-SIDED LOW BACK PAIN WITHOUT SCIATICA: ICD-10-CM

## 2021-05-19 DIAGNOSIS — Z00.00 HEALTH MAINTENANCE EXAMINATION: Primary | ICD-10-CM

## 2021-05-19 DIAGNOSIS — M85.80 OSTEOPENIA, UNSPECIFIED LOCATION: ICD-10-CM

## 2021-05-19 DIAGNOSIS — Z79.899 HIGH RISK MEDICATION USE: ICD-10-CM

## 2021-05-19 DIAGNOSIS — E55.9 VITAMIN D DEFICIENCY: ICD-10-CM

## 2021-05-19 DIAGNOSIS — F41.9 ANXIETY: ICD-10-CM

## 2021-05-19 DIAGNOSIS — M54.50 CHRONIC LEFT-SIDED LOW BACK PAIN WITHOUT SCIATICA: ICD-10-CM

## 2021-05-19 DIAGNOSIS — E78.2 MIXED HYPERLIPIDEMIA: ICD-10-CM

## 2021-05-19 PROCEDURE — 99396 PREV VISIT EST AGE 40-64: CPT | Performed by: NURSE PRACTITIONER

## 2021-05-19 RX ORDER — FINASTERIDE 1 MG/1
TABLET, FILM COATED ORAL
COMMUNITY
Start: 2021-05-12

## 2021-05-19 RX ORDER — ALPRAZOLAM 0.5 MG/1
TABLET ORAL
Qty: 30 TABLET | Refills: 0 | Status: SHIPPED | OUTPATIENT
Start: 2021-05-19 | End: 2022-05-27 | Stop reason: SDUPTHER

## 2021-07-21 ENCOUNTER — HOSPITAL ENCOUNTER (OUTPATIENT)
Dept: MAMMOGRAPHY | Facility: HOSPITAL | Age: 61
Discharge: HOME | End: 2021-07-21
Admitting: NURSE PRACTITIONER

## 2021-07-21 DIAGNOSIS — Z12.31 SCREENING MAMMOGRAM, ENCOUNTER FOR: ICD-10-CM

## 2021-07-21 PROCEDURE — 77063 BREAST TOMOSYNTHESIS BI: CPT

## 2021-07-21 PROCEDURE — 77067 SCR MAMMO BI INCL CAD: CPT

## 2021-07-21 RX ORDER — ALBUTEROL SULFATE 90 UG/1
AEROSOL, METERED RESPIRATORY (INHALATION)
Qty: 8.5 G | Refills: 2 | Status: SHIPPED | OUTPATIENT
Start: 2021-07-21 | End: 2021-12-10

## 2021-07-21 RX ORDER — MIRTAZAPINE 15 MG/1
15 TABLET, FILM COATED ORAL
Qty: 90 TABLET | Refills: 1 | Status: SHIPPED | OUTPATIENT
Start: 2021-07-21 | End: 2022-01-19

## 2021-07-21 RX ORDER — DULOXETIN HYDROCHLORIDE 60 MG/1
60 CAPSULE, DELAYED RELEASE ORAL 2 TIMES DAILY
Qty: 180 CAPSULE | Refills: 1 | Status: SHIPPED | OUTPATIENT
Start: 2021-07-21 | End: 2021-07-21 | Stop reason: SDUPTHER

## 2021-07-22 RX ORDER — DULOXETIN HYDROCHLORIDE 60 MG/1
60 CAPSULE, DELAYED RELEASE ORAL 2 TIMES DAILY
Qty: 180 CAPSULE | Refills: 1 | Status: SHIPPED | OUTPATIENT
Start: 2021-07-22 | End: 2022-05-27 | Stop reason: SDUPTHER

## 2021-07-23 DIAGNOSIS — Z78.0 POSTMENOPAUSAL: Primary | ICD-10-CM

## 2021-10-02 ENCOUNTER — IMMUNIZATION (OUTPATIENT)
Dept: VACCINE CLINIC | Facility: HOSPITAL | Age: 61
End: 2021-10-02

## 2021-10-02 PROCEDURE — 0003A: CPT | Performed by: INTERNAL MEDICINE

## 2021-10-02 PROCEDURE — 0004A ADM SARSCOV2 30MCG/0.3ML BOOSTER: CPT | Performed by: INTERNAL MEDICINE

## 2021-10-02 PROCEDURE — 91300 HC SARSCOV02 VAC 30MCG/0.3ML IM: CPT | Performed by: INTERNAL MEDICINE

## 2021-12-10 RX ORDER — ALBUTEROL SULFATE 90 UG/1
AEROSOL, METERED RESPIRATORY (INHALATION)
Qty: 6.7 G | Refills: 2 | Status: SHIPPED | OUTPATIENT
Start: 2021-12-10 | End: 2022-03-17

## 2021-12-10 NOTE — TELEPHONE ENCOUNTER
Rx Refill Note  Requested Prescriptions     Pending Prescriptions Disp Refills   • albuterol sulfate  (90 Base) MCG/ACT inhaler [Pharmacy Med Name: ALBUTEROL HFA (PROAIR) INHALER]  2     Sig: INHALE 2 PUFFS BY MOUTH EVERY 4 HOURS AS NEEDED FOR WHEEZING      Last office visit with prescribing clinician: 5/19/2021      Next office visit with prescribing clinician: Visit date not found            Jen Melo LPN  12/10/21, 08:36 EST

## 2022-01-19 RX ORDER — MIRTAZAPINE 15 MG/1
TABLET, FILM COATED ORAL
Qty: 90 TABLET | Refills: 1 | Status: SHIPPED | OUTPATIENT
Start: 2022-01-19 | End: 2022-05-27 | Stop reason: SDUPTHER

## 2022-01-19 NOTE — TELEPHONE ENCOUNTER
Rx Refill Note  Requested Prescriptions     Pending Prescriptions Disp Refills   • mirtazapine (REMERON) 15 MG tablet [Pharmacy Med Name: MIRTAZAPINE 15 MG TABLET] 90 tablet 1     Sig: TAKE 1 TABLET BY MOUTH EVERYDAY AT BEDTIME      Last office visit with prescribing clinician: 5/19/2021      Next office visit with prescribing clinician: Visit date not found            Suzan Grant Rep  01/19/22, 16:49 EST

## 2022-03-17 RX ORDER — ALBUTEROL SULFATE 90 UG/1
AEROSOL, METERED RESPIRATORY (INHALATION)
Qty: 6.7 G | Refills: 2 | Status: SHIPPED | OUTPATIENT
Start: 2022-03-17 | End: 2022-05-27 | Stop reason: SDUPTHER

## 2022-03-17 NOTE — TELEPHONE ENCOUNTER
Rx Refill Note  Requested Prescriptions     Pending Prescriptions Disp Refills   • albuterol sulfate  (90 Base) MCG/ACT inhaler [Pharmacy Med Name: ALBUTEROL HFA (PROVENTIL) INH]  2     Sig: TAKE 2 PUFFS BY MOUTH EVERY 4 HOURS AS NEEDED FOR WHEEZE      Last office visit with prescribing clinician: 5/19/2021      Next office visit with prescribing clinician: 5/27/2022            Suzan Grant Rep  03/17/22, 09:28 EDT

## 2022-04-22 ENCOUNTER — TRANSCRIBE ORDERS (OUTPATIENT)
Dept: ADMINISTRATIVE | Facility: HOSPITAL | Age: 62
End: 2022-04-22

## 2022-04-22 DIAGNOSIS — Z12.31 VISIT FOR SCREENING MAMMOGRAM: Primary | ICD-10-CM

## 2022-05-27 ENCOUNTER — OFFICE VISIT (OUTPATIENT)
Dept: FAMILY MEDICINE CLINIC | Facility: CLINIC | Age: 62
End: 2022-05-27

## 2022-05-27 VITALS
WEIGHT: 210 LBS | HEART RATE: 116 BPM | OXYGEN SATURATION: 90 % | HEIGHT: 66 IN | SYSTOLIC BLOOD PRESSURE: 140 MMHG | DIASTOLIC BLOOD PRESSURE: 80 MMHG | TEMPERATURE: 97.6 F | RESPIRATION RATE: 12 BRPM | BODY MASS INDEX: 33.75 KG/M2

## 2022-05-27 DIAGNOSIS — F41.9 ANXIETY: ICD-10-CM

## 2022-05-27 DIAGNOSIS — Z00.00 HEALTH MAINTENANCE EXAMINATION: Primary | ICD-10-CM

## 2022-05-27 DIAGNOSIS — Z78.0 POSTMENOPAUSAL: ICD-10-CM

## 2022-05-27 DIAGNOSIS — E55.9 VITAMIN D DEFICIENCY: ICD-10-CM

## 2022-05-27 DIAGNOSIS — E78.1 PURE HYPERTRIGLYCERIDEMIA: ICD-10-CM

## 2022-05-27 DIAGNOSIS — Z79.899 HIGH RISK MEDICATION USE: ICD-10-CM

## 2022-05-27 PROCEDURE — 99396 PREV VISIT EST AGE 40-64: CPT | Performed by: NURSE PRACTITIONER

## 2022-05-27 RX ORDER — DULOXETIN HYDROCHLORIDE 60 MG/1
60 CAPSULE, DELAYED RELEASE ORAL 2 TIMES DAILY
Qty: 180 CAPSULE | Refills: 1 | Status: SHIPPED | OUTPATIENT
Start: 2022-05-27 | End: 2022-10-13 | Stop reason: SDUPTHER

## 2022-05-27 RX ORDER — ALBUTEROL SULFATE 90 UG/1
2 AEROSOL, METERED RESPIRATORY (INHALATION) EVERY 4 HOURS PRN
Qty: 6.7 G | Refills: 2 | Status: SHIPPED | OUTPATIENT
Start: 2022-05-27 | End: 2022-09-21

## 2022-05-27 RX ORDER — MIRTAZAPINE 15 MG/1
15 TABLET, FILM COATED ORAL
Qty: 90 TABLET | Refills: 1 | Status: SHIPPED | OUTPATIENT
Start: 2022-05-27 | End: 2022-12-21

## 2022-05-27 RX ORDER — NAPROXEN SODIUM 220 MG
220 TABLET ORAL 2 TIMES DAILY PRN
Status: CANCELLED | OUTPATIENT
Start: 2022-05-27

## 2022-05-27 RX ORDER — MULTIPLE VITAMINS W/ MINERALS TAB 9MG-400MCG
1 TAB ORAL DAILY
Status: CANCELLED | OUTPATIENT
Start: 2022-05-27

## 2022-05-27 RX ORDER — ALPRAZOLAM 0.5 MG/1
TABLET ORAL
Qty: 45 TABLET | Refills: 0 | Status: SHIPPED | OUTPATIENT
Start: 2022-05-27

## 2022-05-27 RX ORDER — ALPRAZOLAM 0.5 MG/1
TABLET ORAL
Qty: 30 TABLET | Refills: 0 | Status: SHIPPED | OUTPATIENT
Start: 2022-05-27 | End: 2022-05-27 | Stop reason: SDUPTHER

## 2022-05-27 RX ORDER — MELATONIN
1000 DAILY
Status: CANCELLED | OUTPATIENT
Start: 2022-05-27

## 2022-05-27 RX ORDER — FINASTERIDE 1 MG/1
TABLET, FILM COATED ORAL
Status: CANCELLED | OUTPATIENT
Start: 2022-05-27

## 2022-07-27 ENCOUNTER — HOSPITAL ENCOUNTER (OUTPATIENT)
Dept: MAMMOGRAPHY | Facility: HOSPITAL | Age: 62
Discharge: HOME | End: 2022-07-27
Admitting: NURSE PRACTITIONER

## 2022-07-27 DIAGNOSIS — Z12.31 VISIT FOR SCREENING MAMMOGRAM: ICD-10-CM

## 2022-07-27 PROCEDURE — 77063 BREAST TOMOSYNTHESIS BI: CPT

## 2022-07-27 PROCEDURE — 77067 SCR MAMMO BI INCL CAD: CPT

## 2022-09-21 RX ORDER — ALBUTEROL SULFATE 90 UG/1
AEROSOL, METERED RESPIRATORY (INHALATION)
Qty: 6.7 G | Refills: 2 | Status: SHIPPED | OUTPATIENT
Start: 2022-09-21 | End: 2023-01-05

## 2022-09-21 NOTE — TELEPHONE ENCOUNTER
Rx Refill Note  Requested Prescriptions     Pending Prescriptions Disp Refills   • albuterol sulfate  (90 Base) MCG/ACT inhaler [Pharmacy Med Name: ALBUTEROL HFA (PROVENTIL) INH]  2     Sig: TAKE 2 PUFFS BY MOUTH EVERY 4 HOURS AS NEEDED FOR WHEEZE      Last office visit with prescribing clinician: 5/27/2022      Next office visit with prescribing clinician: Visit date not found            Suzan Grant Rep  09/21/22, 09:37 EDT

## 2022-10-13 RX ORDER — DULOXETIN HYDROCHLORIDE 60 MG/1
60 CAPSULE, DELAYED RELEASE ORAL 2 TIMES DAILY
Qty: 180 CAPSULE | Refills: 1 | Status: SHIPPED | OUTPATIENT
Start: 2022-10-13 | End: 2022-10-14 | Stop reason: SDUPTHER

## 2022-10-13 NOTE — TELEPHONE ENCOUNTER
Rx Refill Note  Requested Prescriptions     Pending Prescriptions Disp Refills   • DULoxetine (CYMBALTA) 60 MG capsule 180 capsule 1     Sig: Take 1 capsule by mouth 2 (Two) Times a Day.      Last office visit with prescribing clinician: 5/27/2022      Next office visit with prescribing clinician: Visit date not found            Raysa Cool  10/13/22, 11:08 EDT

## 2022-10-14 RX ORDER — DULOXETIN HYDROCHLORIDE 60 MG/1
60 CAPSULE, DELAYED RELEASE ORAL 2 TIMES DAILY
Qty: 180 CAPSULE | Refills: 1 | Status: SHIPPED | OUTPATIENT
Start: 2022-10-14 | End: 2023-02-14

## 2022-10-14 NOTE — TELEPHONE ENCOUNTER
Rx Refill Note  Requested Prescriptions     Pending Prescriptions Disp Refills   • DULoxetine (CYMBALTA) 60 MG capsule 180 capsule 1     Sig: Take 1 capsule by mouth 2 (Two) Times a Day.      Last office visit with prescribing clinician: 5/27/2022      Next office visit with prescribing clinician: Visit date not found            Suzan Grant Rep  10/14/22, 12:29 EDT

## 2022-12-12 ENCOUNTER — HOSPITAL ENCOUNTER (OUTPATIENT)
Dept: BONE DENSITY | Facility: HOSPITAL | Age: 62
Discharge: HOME | End: 2022-12-12
Admitting: NURSE PRACTITIONER

## 2022-12-12 PROCEDURE — 77080 DXA BONE DENSITY AXIAL: CPT

## 2022-12-21 RX ORDER — MIRTAZAPINE 15 MG/1
TABLET, FILM COATED ORAL
Qty: 90 TABLET | Refills: 1 | Status: SHIPPED | OUTPATIENT
Start: 2022-12-21

## 2022-12-21 NOTE — TELEPHONE ENCOUNTER
Rx Refill Note  Requested Prescriptions     Pending Prescriptions Disp Refills   • mirtazapine (REMERON) 15 MG tablet [Pharmacy Med Name: MIRTAZAPINE 15 MG TABLET] 90 tablet 1     Sig: TAKE 1 TABLET BY MOUTH EVERYDAY AT BEDTIME      Last office visit with prescribing clinician: 5/27/2022   Last telemedicine visit with prescribing clinician: Visit date not found   Next office visit with prescribing clinician: Visit date not found                         Would you like a call back once the refill request has been completed: [] Yes [] No    If the office needs to give you a call back, can they leave a voicemail: [] Yes [] No    Suzan Grant Rep  12/21/22, 11:00 EST

## 2023-01-05 RX ORDER — ALBUTEROL SULFATE 90 UG/1
AEROSOL, METERED RESPIRATORY (INHALATION)
Qty: 6.7 G | Refills: 2 | Status: SHIPPED | OUTPATIENT
Start: 2023-01-05

## 2023-01-05 NOTE — TELEPHONE ENCOUNTER
Rx Refill Note  Requested Prescriptions     Pending Prescriptions Disp Refills   • albuterol sulfate  (90 Base) MCG/ACT inhaler [Pharmacy Med Name: ALBUTEROL HFA (PROVENTIL) INH]  2     Sig: INHALE 2 PUFFS BY MOUTH EVERY 4 HOURS AS NEEDED FOR WHEEZE      Last office visit with prescribing clinician: 5/27/2022   Last telemedicine visit with prescribing clinician: Visit date not found   Next office visit with prescribing clinician: Visit date not found                         Would you like a call back once the refill request has been completed: [] Yes [] No    If the office needs to give you a call back, can they leave a voicemail: [] Yes [] No    Suzan Grant Rep  01/05/23, 10:25 EST

## 2023-02-14 RX ORDER — DULOXETIN HYDROCHLORIDE 60 MG/1
CAPSULE, DELAYED RELEASE ORAL
Qty: 180 CAPSULE | Refills: 1 | Status: SHIPPED | OUTPATIENT
Start: 2023-02-14

## 2023-02-14 NOTE — TELEPHONE ENCOUNTER
Rx Refill Note  Requested Prescriptions     Pending Prescriptions Disp Refills   • DULoxetine (CYMBALTA) 60 MG capsule [Pharmacy Med Name: DULOXETINE HCL DR 60 MG CAP] 180 capsule 1     Sig: TAKE 1 CAPSULE BY MOUTH TWICE A DAY      Last office visit with prescribing clinician: 5/27/2022   Last telemedicine visit with prescribing clinician: 6/2/2023   Next office visit with prescribing clinician: 6/2/2023                         Would you like a call back once the refill request has been completed: [] Yes [] No    If the office needs to give you a call back, can they leave a voicemail: [] Yes [] No    Suzan Grant Rep  02/14/23, 16:19 EST

## 2023-04-17 RX ORDER — ALBUTEROL SULFATE 90 UG/1
AEROSOL, METERED RESPIRATORY (INHALATION)
Qty: 6.7 G | Refills: 2 | Status: SHIPPED | OUTPATIENT
Start: 2023-04-17

## 2023-04-17 NOTE — TELEPHONE ENCOUNTER
Rx Refill Note  Requested Prescriptions     Pending Prescriptions Disp Refills   • albuterol sulfate  (90 Base) MCG/ACT inhaler [Pharmacy Med Name: ALBUTEROL HFA (PROVENTIL) INH]  2     Sig: INHALE 2 PUFFS BY MOUTH EVERY 4 HOURS AS NEEDED FOR WHEEZING      Last office visit with prescribing clinician: 5/27/2022   Last telemedicine visit with prescribing clinician: 6/2/2023   Next office visit with prescribing clinician: 6/2/2023                         Would you like a call back once the refill request has been completed: [] Yes [] No    If the office needs to give you a call back, can they leave a voicemail: [] Yes [] No    Suzan Grant Rep  04/17/23, 09:09 EDT

## 2023-06-02 ENCOUNTER — OFFICE VISIT (OUTPATIENT)
Dept: FAMILY MEDICINE CLINIC | Facility: CLINIC | Age: 63
End: 2023-06-02

## 2023-06-02 VITALS
HEART RATE: 83 BPM | RESPIRATION RATE: 14 BRPM | TEMPERATURE: 97.7 F | SYSTOLIC BLOOD PRESSURE: 150 MMHG | WEIGHT: 206 LBS | HEIGHT: 66 IN | BODY MASS INDEX: 33.11 KG/M2 | OXYGEN SATURATION: 94 % | DIASTOLIC BLOOD PRESSURE: 92 MMHG

## 2023-06-02 DIAGNOSIS — Z79.899 HIGH RISK MEDICATION USE: ICD-10-CM

## 2023-06-02 DIAGNOSIS — F41.9 ANXIETY: ICD-10-CM

## 2023-06-02 DIAGNOSIS — Z72.0 TOBACCO ABUSE: ICD-10-CM

## 2023-06-02 DIAGNOSIS — M54.42 CHRONIC MIDLINE LOW BACK PAIN WITH BILATERAL SCIATICA: ICD-10-CM

## 2023-06-02 DIAGNOSIS — M81.0 OSTEOPOROSIS WITHOUT CURRENT PATHOLOGICAL FRACTURE, UNSPECIFIED OSTEOPOROSIS TYPE: ICD-10-CM

## 2023-06-02 DIAGNOSIS — Z12.31 ENCOUNTER FOR SCREENING MAMMOGRAM FOR BREAST CANCER: ICD-10-CM

## 2023-06-02 DIAGNOSIS — G89.29 CHRONIC MIDLINE LOW BACK PAIN WITH BILATERAL SCIATICA: ICD-10-CM

## 2023-06-02 DIAGNOSIS — M54.41 CHRONIC MIDLINE LOW BACK PAIN WITH BILATERAL SCIATICA: ICD-10-CM

## 2023-06-02 DIAGNOSIS — R03.0 ELEVATED BLOOD PRESSURE READING WITHOUT DIAGNOSIS OF HYPERTENSION: ICD-10-CM

## 2023-06-02 DIAGNOSIS — Z00.00 HEALTH MAINTENANCE EXAMINATION: Primary | ICD-10-CM

## 2023-06-02 DIAGNOSIS — E55.9 VITAMIN D DEFICIENCY: ICD-10-CM

## 2023-06-02 DIAGNOSIS — E78.2 MIXED HYPERLIPIDEMIA: ICD-10-CM

## 2023-06-02 PROBLEM — M54.50 CHRONIC LEFT-SIDED LOW BACK PAIN WITHOUT SCIATICA: Status: RESOLVED | Noted: 2018-04-18 | Resolved: 2023-06-02

## 2023-06-02 PROCEDURE — 99396 PREV VISIT EST AGE 40-64: CPT | Performed by: NURSE PRACTITIONER

## 2023-06-02 RX ORDER — ALPRAZOLAM 0.5 MG/1
TABLET ORAL
Qty: 45 TABLET | Refills: 0 | Status: SHIPPED | OUTPATIENT
Start: 2023-06-02

## 2023-06-02 RX ORDER — DULOXETIN HYDROCHLORIDE 60 MG/1
60 CAPSULE, DELAYED RELEASE ORAL 2 TIMES DAILY
Qty: 180 CAPSULE | Refills: 1 | Status: SHIPPED | OUTPATIENT
Start: 2023-06-02

## 2023-06-02 RX ORDER — ALBUTEROL SULFATE 90 UG/1
2 AEROSOL, METERED RESPIRATORY (INHALATION) EVERY 4 HOURS PRN
Qty: 6.7 G | Refills: 2 | Status: SHIPPED | OUTPATIENT
Start: 2023-06-02

## 2023-06-02 RX ORDER — MIRTAZAPINE 15 MG/1
15 TABLET, FILM COATED ORAL
Qty: 90 TABLET | Refills: 1 | Status: SHIPPED | OUTPATIENT
Start: 2023-06-02

## 2023-06-02 RX ORDER — FINASTERIDE 1 MG/1
TABLET, FILM COATED ORAL
Status: CANCELLED | OUTPATIENT
Start: 2023-06-02

## 2023-06-02 NOTE — PATIENT INSTRUCTIONS
Discharge instructions continue present plan to simply recheck your blood pressure make sure it is normal.  Strongly encourage you to quit smoking,    Labs as soon as possible need to further evaluate your calcium and your vitamin D and parathyroid hormone to make sure it is okay otherwise  We should consider biphosphonate or similar  For treatment of osteoporosis you can check with your insurance regarding treatment options of what may be covered let me know which you desire after we finish the work-up.    As well as never hurts to talk with your oral surgeon's etc.    As long as you are doing well and your blood pressures controlled you are doing nicely follow-up yearly, dermatology for any skin tag removal etc.    Continue healthy diet, consider swimming, continue back exercises daily you can do these on the floor or from your bed  MRI low back, call for results    Consider injection may be an option    Decrease processed foods calories,  Some modest reduction in calories over the next year will equal great results  And likely improve mobility and likely less pain as well.

## 2023-06-02 NOTE — PROGRESS NOTES
"Chief Complaint  No chief complaint on file.    Subjective        Ro Perez presents to [facility]  History of Present Illness  Chief complaint health maintenance  Pleasant patient here today for her physical, overall she is doing well, except her mother just fell has hip fracture,  She has a hard time coming in here very busy and wants to take care of what she can today,  Elevated blood pressure without hypertension, she has no hypertension so simply recheck her blood pressure and let me know if it is elevated  Osteoporosis, positive tobacco no history of osteoporotic fractures however  Next hyperlipidemia takes a statin anxiety, and chronic degenerative arthritis as well as osteoarthritis of the hip takes duloxetine helps quite a bit with anxiety as well as pain  Occasionally breakthrough feelings of panic anxiety is taking low-dose generic Xanax without difficulty 1 prescription per year has been appropriate controlled substance agreements have been maintained UDS is well  She is behind on lab and we need to do this she is reassures me that she will get this soon.  No chest pain shortness of breath no night sweats or unexplained weight loss.    Chronic midline low back pain radiates down her legs at times and numbness,  She has had this quite a few years, wax and wanes never goes away still maintains chronic exercises, and anti-inflammatories nothing is helped think she is ready to go ahead and get the MRI    She has no history of malignancy no no fevers      Objective   Vital Signs:  /92   Pulse 83   Temp 97.7 °F (36.5 °C) (Temporal)   Resp 14   Ht 167.6 cm (66\")   Wt 93.4 kg (206 lb)   SpO2 94%   BMI 33.25 kg/m²   Estimated body mass index is 33.25 kg/m² as calculated from the following:    Height as of this encounter: 167.6 cm (66\").    Weight as of this encounter: 93.4 kg (206 lb).          Physical Exam  Vitals reviewed.   Constitutional:       General: She is not " in acute distress.     Appearance: Normal appearance. She is well-developed. She is not ill-appearing, toxic-appearing or diaphoretic.   HENT:      Head: Normocephalic.      Right Ear: Tympanic membrane normal.      Left Ear: Tympanic membrane normal.      Nose: Nose normal.   Eyes:      General: No scleral icterus.     Conjunctiva/sclera: Conjunctivae normal.      Pupils: Pupils are equal, round, and reactive to light.   Neck:      Thyroid: No thyromegaly.      Vascular: No JVD.      Comments: Carotids clear thyroid normal  Cardiovascular:      Rate and Rhythm: Normal rate and regular rhythm.      Heart sounds: Normal heart sounds. No murmur heard.    No friction rub. No gallop.   Pulmonary:      Effort: Pulmonary effort is normal. No respiratory distress.      Breath sounds: Normal breath sounds. No stridor. No wheezing or rales.   Abdominal:      General: Bowel sounds are normal. There is no distension.      Palpations: Abdomen is soft.      Tenderness: There is no abdominal tenderness.      Comments: No hepatosplenomegaly, no ascites,   Musculoskeletal:         General: No tenderness. Normal range of motion.      Cervical back: Neck supple.   Lymphadenopathy:      Cervical: No cervical adenopathy.   Skin:     General: Skin is warm and dry.      Capillary Refill: Capillary refill takes less than 2 seconds.      Findings: No erythema or rash.   Neurological:      General: No focal deficit present.      Mental Status: She is alert and oriented to person, place, and time. Mental status is at baseline.      Cranial Nerves: No cranial nerve deficit.      Deep Tendon Reflexes: Reflexes are normal and symmetric.   Psychiatric:         Mood and Affect: Mood normal.         Behavior: Behavior normal.         Thought Content: Thought content normal.         Judgment: Judgment normal.        Result Review :                Assessment and Plan   Diagnoses and all orders for this visit:    1. Health maintenance examination  (Primary)  -     CBC & Differential; Future  -     Comprehensive Metabolic Panel; Future  -     TSH Rfx On Abnormal To Free T4; Future  -     Hemoglobin A1c; Future  -     Lipid Panel With LDL / HDL Ratio; Future  -     PTH, Intact & Calcium; Future  -     Vitamin D,25-Hydroxy; Future  -     ToxASSURE Select 13 (MW) - Urine, Clean Catch; Future    2. Anxiety  -     ALPRAZolam (Xanax) 0.5 MG tablet; One half to one tablet daily as needed for anxiety, caution sedation do not drive.  Dispense: 45 tablet; Refill: 0  -     CBC & Differential; Future  -     Comprehensive Metabolic Panel; Future  -     TSH Rfx On Abnormal To Free T4; Future  -     Hemoglobin A1c; Future  -     Lipid Panel With LDL / HDL Ratio; Future  -     PTH, Intact & Calcium; Future  -     Vitamin D,25-Hydroxy; Future  -     ToxASSURE Select 13 (MW) - Urine, Clean Catch; Future    3. Osteoporosis without current pathological fracture, unspecified osteoporosis type  -     CBC & Differential; Future  -     Comprehensive Metabolic Panel; Future  -     TSH Rfx On Abnormal To Free T4; Future  -     Hemoglobin A1c; Future  -     Lipid Panel With LDL / HDL Ratio; Future  -     PTH, Intact & Calcium; Future  -     Vitamin D,25-Hydroxy; Future  -     ToxASSURE Select 13 (MW) - Urine, Clean Catch; Future    4. Mixed hyperlipidemia  -     CBC & Differential; Future  -     Comprehensive Metabolic Panel; Future  -     TSH Rfx On Abnormal To Free T4; Future  -     Hemoglobin A1c; Future  -     Lipid Panel With LDL / HDL Ratio; Future  -     PTH, Intact & Calcium; Future  -     Vitamin D,25-Hydroxy; Future  -     ToxASSURE Select 13 (MW) - Urine, Clean Catch; Future    5. Elevated blood pressure (not hypertension)  -     CBC & Differential; Future  -     Comprehensive Metabolic Panel; Future  -     TSH Rfx On Abnormal To Free T4; Future  -     Hemoglobin A1c; Future  -     Lipid Panel With LDL / HDL Ratio; Future  -     PTH, Intact & Calcium; Future  -     Vitamin  D,25-Hydroxy; Future  -     ToxASSURE Select 13 (MW) - Urine, Clean Catch; Future    6. Vitamin D deficiency  -     CBC & Differential; Future  -     Comprehensive Metabolic Panel; Future  -     TSH Rfx On Abnormal To Free T4; Future  -     Hemoglobin A1c; Future  -     Lipid Panel With LDL / HDL Ratio; Future  -     PTH, Intact & Calcium; Future  -     Vitamin D,25-Hydroxy; Future  -     ToxASSURE Select 13 (MW) - Urine, Clean Catch; Future    7. Tobacco abuse  -     CBC & Differential; Future  -     Comprehensive Metabolic Panel; Future  -     TSH Rfx On Abnormal To Free T4; Future  -     Hemoglobin A1c; Future  -     Lipid Panel With LDL / HDL Ratio; Future  -     PTH, Intact & Calcium; Future  -     Vitamin D,25-Hydroxy; Future  -     ToxASSURE Select 13 (MW) - Urine, Clean Catch; Future    8. Encounter for screening mammogram for breast cancer  -     Mammo Screening Bilateral With CAD  -     CBC & Differential; Future  -     Comprehensive Metabolic Panel; Future  -     TSH Rfx On Abnormal To Free T4; Future  -     Hemoglobin A1c; Future  -     Lipid Panel With LDL / HDL Ratio; Future  -     PTH, Intact & Calcium; Future  -     Vitamin D,25-Hydroxy; Future  -     ToxASSURE Select 13 (MW) - Urine, Clean Catch; Future    9. High risk medication use  -     CBC & Differential; Future  -     Comprehensive Metabolic Panel; Future  -     TSH Rfx On Abnormal To Free T4; Future  -     Hemoglobin A1c; Future  -     Lipid Panel With LDL / HDL Ratio; Future  -     PTH, Intact & Calcium; Future  -     Vitamin D,25-Hydroxy; Future  -     ToxASSURE Select 13 (MW) - Urine, Clean Catch; Future    10. Chronic midline low back pain with bilateral sciatica  -     MRI Lumbar Spine Without Contrast; Future    Other orders  -     albuterol sulfate  (90 Base) MCG/ACT inhaler; Inhale 2 puffs Every 4 (Four) Hours As Needed for Wheezing.  Dispense: 6.7 g; Refill: 2  -     DULoxetine (CYMBALTA) 60 MG capsule; Take 1 capsule by mouth  2 (Two) Times a Day.  Dispense: 180 capsule; Refill: 1  -     mirtazapine (REMERON) 15 MG tablet; Take 1 tablet by mouth every night at bedtime.  Dispense: 90 tablet; Refill: 1             Follow Up   Return for Annual physical, Labs before next visit.  Patient was given instructions and counseling regarding her condition or for health maintenance advice. Please see specific information pulled into the AVS if appropriate.     Patient Instructions   Discharge instructions continue present plan to simply recheck your blood pressure make sure it is normal.  Strongly encourage you to quit smoking,    Labs as soon as possible need to further evaluate your calcium and your vitamin D and parathyroid hormone to make sure it is okay otherwise  We should consider biphosphonate or similar  For treatment of osteoporosis you can check with your insurance regarding treatment options of what may be covered let me know which you desire after we finish the work-up.    As well as never hurts to talk with your oral surgeon's etc.    As long as you are doing well and your blood pressures controlled you are doing nicely follow-up yearly, dermatology for any skin tag removal etc.    Continue healthy diet, consider swimming, continue back exercises daily you can do these on the floor or from your bed  MRI low back, call for results    Consider injection may be an option    Decrease processed foods calories,  Some modest reduction in calories over the next year will equal great results  And likely improve mobility and likely less pain as well.             Controlled subs agreement updated requested added UDS to lab

## 2023-06-09 ENCOUNTER — TRANSCRIBE ORDERS (OUTPATIENT)
Dept: ADMINISTRATIVE | Facility: HOSPITAL | Age: 63
End: 2023-06-09
Payer: COMMERCIAL

## 2023-06-09 DIAGNOSIS — Z12.31 VISIT FOR SCREENING MAMMOGRAM: Primary | ICD-10-CM

## 2023-07-03 ENCOUNTER — TELEPHONE (OUTPATIENT)
Dept: FAMILY MEDICINE CLINIC | Facility: CLINIC | Age: 63
End: 2023-07-03

## 2023-07-03 NOTE — TELEPHONE ENCOUNTER
Hub staff attempted to follow warm transfer process and was unsuccessful     Caller: Ro Perez    Relationship to patient: Self    Best call back number: [phone]     Patient is needing:PATIENT IS RETURNING BELINDA'S CALL    PLEASE CALL AND ADVISE

## 2023-08-02 ENCOUNTER — TELEPHONE (OUTPATIENT)
Dept: FAMILY MEDICINE CLINIC | Facility: CLINIC | Age: 63
End: 2023-08-02
Payer: COMMERCIAL

## 2023-08-02 ENCOUNTER — HOSPITAL ENCOUNTER (OUTPATIENT)
Dept: MAMMOGRAPHY | Facility: HOSPITAL | Age: 63
Discharge: HOME | End: 2023-08-02
Admitting: NURSE PRACTITIONER
Payer: COMMERCIAL

## 2023-08-02 DIAGNOSIS — Z12.31 VISIT FOR SCREENING MAMMOGRAM: ICD-10-CM

## 2023-08-02 PROCEDURE — 77063 BREAST TOMOSYNTHESIS BI: CPT

## 2023-08-02 PROCEDURE — 77067 SCR MAMMO BI INCL CAD: CPT

## 2023-08-03 DIAGNOSIS — G89.29 CHRONIC LOW BACK PAIN, UNSPECIFIED BACK PAIN LATERALITY, UNSPECIFIED WHETHER SCIATICA PRESENT: Primary | ICD-10-CM

## 2023-08-03 DIAGNOSIS — M54.50 CHRONIC LOW BACK PAIN, UNSPECIFIED BACK PAIN LATERALITY, UNSPECIFIED WHETHER SCIATICA PRESENT: Primary | ICD-10-CM

## 2023-08-09 ENCOUNTER — HOSPITAL ENCOUNTER (OUTPATIENT)
Dept: DIABETES SERVICES | Facility: HOSPITAL | Age: 63
Discharge: HOME | End: 2023-08-09
Admitting: NURSE PRACTITIONER
Payer: COMMERCIAL

## 2023-08-09 PROCEDURE — 97802 MEDICAL NUTRITION INDIV IN: CPT

## 2023-08-09 NOTE — CONSULTS
Ro was seen today by Registered Dietitian for Diabetes diet education. Consistent with the ADA's standards of care, a comprehensive assessment/training record has been sent to medical records (see media tab).    Starting PT soon to learn exercises to help with her back pain. Smokes about half a pack a day. On ozempic with good results. Has cut out mountain dews- drank 4 or 5 mountain dews daily. Carb counting, ADA plate, labels, portions, meal planning reviewed.     Ro has been encouraged to call our office with questions or additional education needs. Please place referral for additional services or follow-up should need arise.    Thank you for the referral.

## 2023-08-18 ENCOUNTER — HOSPITAL ENCOUNTER (OUTPATIENT)
Dept: ULTRASOUND IMAGING | Facility: HOSPITAL | Age: 63
End: 2023-08-18
Payer: COMMERCIAL

## 2023-08-31 ENCOUNTER — HOSPITAL ENCOUNTER (OUTPATIENT)
Dept: PHYSICAL THERAPY | Facility: HOSPITAL | Age: 63
Discharge: HOME | End: 2023-08-31
Payer: COMMERCIAL

## 2023-08-31 DIAGNOSIS — G89.29 CHRONIC BILATERAL LOW BACK PAIN WITHOUT SCIATICA: Primary | ICD-10-CM

## 2023-08-31 DIAGNOSIS — M54.50 CHRONIC BILATERAL LOW BACK PAIN WITHOUT SCIATICA: Primary | ICD-10-CM

## 2023-08-31 DIAGNOSIS — R26.2 DIFFICULTY WALKING: ICD-10-CM

## 2023-08-31 PROCEDURE — 97110 THERAPEUTIC EXERCISES: CPT

## 2023-08-31 PROCEDURE — 97161 PT EVAL LOW COMPLEX 20 MIN: CPT

## 2023-08-31 NOTE — THERAPY EVALUATION
Outpatient Physical Therapy Ortho Initial Evaluation  [facility]     Patient Name: Ro Perez  : 1960  MRN: [id number]  Today's Date: 2023      Visit Date: 2023    Patient Active Problem List   Diagnosis    Anxiety    Abnormal finding on mammography    Osteoarthritis of hip    Vitamin D deficiency    Hyperlipidemia    Elevated blood pressure (not hypertension)    Health maintenance examination    High risk medication use    Family history of breast cancer    Family history of polyps in the colon    Anal skin tag    Osteoporosis without current pathological fracture    Tobacco abuse        Past Medical History:   Diagnosis Date    Abnormal finding on mammography 2016    Anemia     Anxiety     Benign breast lumps     Bloating     Bursitis of shoulder, right 2019    Chronic left-sided low back pain without sciatica     Chronic right shoulder pain     Colon polyps     FOLLOWED BY DR. CATE LANG    Depression     DIAZ (dyspnea on exertion) 2018    Fatigue     High risk medication use 2018    Hyperlipidemia     MIXED    Insomnia     Kidney stones     OA (osteoarthritis)     Osteopenia     Postmenopausal     Skin tag of anus     Urgency incontinence 2016    Vitamin D deficiency     Weakness of shoulder     RIGHT SHOULDER    Weight gain         Past Surgical History:   Procedure Laterality Date     SECTION N/A 1997     SECTION N/A 2000    COLONOSCOPY N/A 2020    3 CM LESION IN PERIANAL-SUSPECTED CONDYLOMA, MULTIPLE MEDIUM DIVERTICULA IN SIGMOID, 3 HYPERPLASTIC POLYPS IN RECTUM, MEDIUM INTERNAL HEMORRHOIDS, DR. CATE LANG AT [facility]      EYE SURGERY  2005    Lasik    HEMORRHOIDECTOMY N/A 2020    Procedure: RIGHT POSTERIOR ANAL TAG EXCISION;  Surgeon: Tessa Ball MD;  Location: [facility]  Service: General;  Laterality: N/A;    LASER ABLATION      LASIK Bilateral        Visit Dx:     ICD-10-CM ICD-9-CM   1. Chronic bilateral  low back pain without sciatica  M54.50 724.2    G89.29 338.29   2. Difficulty walking  R26.2 719.7          Patient History       Row Name 08/31/23 1000             History    Chief Complaint Difficulty with daily activities;Muscle weakness;Pain  -RS      Type of Pain Back pain  -RS      Date Current Problem(s) Began --  chronic issue  -RS      Brief Description of Current Complaint The pt is a 64 yo female who presents with chronic low back pain in her lower back and into L hip. If she walks for longer distances, she gets numbness in the front of her L thigh that improves with sitting. She has performed some exercises given to her by PCP but has not tried PT for the pain. She is awaiting referral to pain management. Her goal with PT is to establish HEP she can do at home, she has a total. 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  -RS      Hand Dominance right-handed  -RS         Pain     Pain Location Back  -RS      Pain at Present 4  -RS      Pain at Worst 5  -RS      Is your sleep disturbed? No  -RS         Fall Risk Assessment    Any falls in the past year: No  -RS         Services    Are you currently receiving Home Health services No  -RS         Daily Activities    Primary Language English  -RS      Are you able to read Yes  -RS      Are you able to write Yes  -RS      How does patient learn best? Demonstration;Pictures/Video  -RS      Teaching needs identified Home Exercise Program;Management of Condition  -RS      Pt Participated in POC and Goals Yes  -RS         Safety    Are you being hurt, hit, or frightened by anyone at home or in your life? No  -RS      Are you being neglected by a caregiver No  -RS                User Key  (r) = Recorded By, (t) = Taken By, (c) = Cosigned By      Initials Name Provider Type    RS Samina Finney PT Physical Therapist                     PT Ortho        Row Name 08/31/23 1000       Posture/Observations    Alignment Options Rounded shoulders;Thoracic kyphosis;Scoliosis  -RS    Thoracic Kyphosis Moderate  -RS    Rounded Shoulders Moderate  -RS    Scoliosis Left:;Moderate  L rib hump  -RS       Quarter Clearing    Quarter Clearing Lower Quarter Clearing  -RS       Neural Tension Signs- Lower Quarter Clearing    SLR Bilateral:;Negative  -RS       Myotomal Screen- Lower Quarter Clearing    Hip flexion (L2) Right:;4+ (Good +);Left:;4 (Good)  L back pain  -RS    Knee extension (L3) Right:;5 (Normal);Left:;4+ (Good +)  -RS    Ankle DF (L4) Right:;5 (Normal);Left:;4+ (Good +)  -RS    Ankle PF (S1) Bilateral:;4+ (Good +)  -RS    Knee flexion (S2) Right:;4+ (Good +);Left:;4 (Good)  -RS       Lumbar ROM Screen- Lower Quarter Clearing    Lumbar Flexion Normal  L rib hump present, pain coming back to stand  -RS    Lumbar Extension Impaired  pain in lumbar spine- 60% WNL  -RS    Lumbar Lateral Flexion Impaired  pain on the L going to the R, no pain going L  -RS    Lumbar Rotation Impaired  -RS       SI/Hip Screen- Lower Quarter Clearing    Angely's/Tucker's test Bilateral:;Negative  -RS       Special Tests/Palpation    Special Tests/Palpation Lumbar/SI  -RS       Lumbosacral Accessory Motions    Lumbosacral Accessory Motions Tested? Yes  hypomobility noted thoracic spine  -RS       Lumbosacral Palpation    Lumbosacral Palpation? Yes  -RS    Piriformis Left:;Bilateral:;Tender  -RS    Quadratus Lumborum Left:;Tender  -RS       MMT (Manual Muscle Testing)    Rt Lower Ext Rt Hip Extension;Rt Hip ABduction  -RS    Lt Lower Ext Lt Hip Extension;Lt Hip ABduction  -RS       MMT Right Lower Ext    Rt Hip Extension MMT, Gross Movement (4/5) good  -RS    Rt Hip ABduction MMT, Gross Movement (4/5) good  -RS       MMT Left Lower Ext    Lt Hip Extension MMT, Gross Movement (4-/5) good minus  -RS    Lt Hip ABduction MMT, Gross Movement (4-/5) good minus  -RS       Flexibility     Flexibility Tested? Lower Extremity  -RS       Lower Extremity Flexibility    Hamstrings Bilateral:;Mildly limited  -RS    Hip External Rotators Left:;Moderately limited;Right:;Mildly limited  -RS       Balance Skills Training    SLS dec stability LLE compared to R with reports of inc pain  -RS       Gait/Stairs (Locomotion)    Comment, (Gait/Stairs) mildly antalgic LLE, specifically initially upon walking, dec marvel, forward flexed trunk and L leaning  -RS              User Key  (r) = Recorded By, (t) = Taken By, (c) = Cosigned By      Initials Name Provider Type    Samina Ta PT Physical Therapist                                Therapy Education  Education Details: Access Code [email or web address]  Given: HEP, Symptoms/condition management  Program: New  How Provided: Verbal, Demonstration, Written  Provided to: Patient  Level of Understanding: Verbalized, Demonstrated      PT OP Goals       Row Name 08/31/23 1000          PT Short Term Goals    STG Date to Achieve 10/15/23  -RS     STG 1 The pt will report at least 40% reduction in LLE neural symptoms for improved walking tolerance.  -RS     STG 1 Progress New  -RS        Long Term Goals    LTG Date to Achieve 11/14/23  -RS     LTG 1 The pt will demonstrate IND and compliant with progressive HEP focused on IND condition management and return to PLOF.  -RS     LTG 1 Progress New  -RS     LTG 2 The pt will score less than or equal to 35% disability on the modified Oswestry to indicate improved perceived performance of ADLs.  -RS     LTG 2 Progress New  -RS     LTG 3 The pt will ambulate with near normal gait pattern for 15 min prior to increased pain for improved functional activity tolerance.  -RS     LTG 3 Progress New  -RS        Time Calculation    PT Goal Re-Cert Due Date 11/29/23  -RS               User Key  (r) = Recorded By, (t) = Taken By, (c) = Cosigned By      Initials Name Provider Type    RS Samina Finney PT Physical Therapist                      PT Assessment/Plan       Row Name 08/31/23 1000          PT Assessment    Functional Limitations Performance in work activities;Performance in leisure activities;Limitations in community activities;Limitation in home management  -RS     Impairments Posture;Poor body mechanics;Pain;Muscle strength;Range of motion;Impaired muscle power;Impaired muscle length;Impaired muscle endurance  -RS     Assessment Comments Ro Perez is a 63 y.o. female referred to physical therapy for L>R LBP of chronic nature. She presents with a stable clinical presentation, along with no remarkable comorbidities and personal factors of chronicity of pain, increased work demands (desk job weekdays, [employer] weekends)  that may impact her progress in the plan of care. Pt presents today with decreased lateral and posterior hip strength (L weaker than R), TTP L piriformis, dec single limb stability L compared to R, altered gait pattern . her signs and symptoms are consistent with referring diagnosis. The previous impairments limit her ability to tolerate walking more than 5 min without pain, navigate stairs confidently, perform household chores without pain. The pt self scores 50% disability on the JACKIE (100=full disability).Pt will benefit from skilled PT to address the previous impairments and return to PLOF.  -RS     Please refer to paper survey for additional self-reported information No  -RS     Rehab Potential Good  -RS     Patient/caregiver participated in establishment of treatment plan and goals Yes  -RS     Patient would benefit from skilled therapy intervention Yes  -RS        PT Plan    PT Frequency Other (comment)  every other week for 6-8 weeks  -RS     Predicted Duration of Therapy Intervention (PT) 3-4 total sessions, 8 weeks  -RS     Planned CPT's? PT EVAL LOW COMPLEXITY: 63956;PT RE-EVAL: 51601;PT THER PROC EA 15 MIN: 45927;PT THER ACT EA 15 MIN: 08859;PT MANUAL THERAPY EA 15 MIN: 28263;PT NEUROMUSC RE-EDUCATION EA 15 MIN: 52320;PT  GAIT TRAINING EA 15 MIN: 08717  -RS     PT Plan Comments Focus on IND with HEP andestablishing program, pt has total gym that she would like to incorporate if possible. Likely warm up on nustep, add open books, shoulder ext, AR press, side steps, step ups  -RS               User Key  (r) = Recorded By, (t) = Taken By, (c) = Cosigned By      Initials Name Provider Type    RS Samina Finney PT Physical Therapist                       OP Exercises       Row Name 08/31/23 1000             Total Minutes    05677 - PT Therapeutic Exercise Minutes 24  -RS         Exercise 1    Exercise Name 1 education- dermatomes, anatomy, POC, HEP  -RS         Exercise 2    Exercise Name 2 LTR  -RS      Cueing 2 Verbal;Demo  -RS      Reps 2 10ea  -RS         Exercise 3    Exercise Name 3 piriformis stretch  -RS      Cueing 3 Verbal;Demo  -RS      Reps 3 3  -RS      Time 3 20s  -RS         Exercise 4    Exercise Name 4 sl clamshell with band  -RS      Cueing 4 Verbal;Demo  -RS      Sets 4 2  -RS      Reps 4 10-15  -RS      Time 4 RTB  -RS         Exercise 5    Exercise Name 5 STS  -RS      Cueing 5 Verbal;Demo  -RS      Reps 5 10  -RS      Time 5 equal WB BLE  -RS         Exercise 6    Exercise Name 6 PPT  -RS      Cueing 6 Verbal;Demo  -RS      Reps 6 10  -RS      Time 6 5  -RS         Exercise 7    Exercise Name 7 beginner bridge  -RS      Cueing 7 Verbal;Demo  -RS      Reps 7 10  -RS      Time 7 3  -RS         Exercise 8    Exercise Name 8 SLS  -RS      Reps 8 demo only  -RS                User Key  (r) = Recorded By, (t) = Taken By, (c) = Cosigned By      Initials Name Provider Type    RS Samina Finney PT Physical Therapist                                  Outcome Measure Options: Modified Oswestry  Modified Oswestry  Modified Oswestry Score/Comments: 50% disability      Time Calculation:     Start Time: 1000  Stop Time: 1038  Time Calculation (min): 38 min  Timed Charges  23846 - PT Therapeutic Exercise Minutes: 24  Untimed  Charges  PT Eval/Re-eval Minutes: 14  Total Minutes  Timed Charges Total Minutes: 24  Untimed Charges Total Minutes: 14   Total Minutes: 14     Therapy Charges for Today       Code Description Service Date Service Provider Modifiers Qty    66473608795 HC PT THER PROC EA 15 MIN 8/31/2023 Samina Finney, PT GP 2    36795887929 HC PT EVAL LOW COMPLEXITY 1 8/31/2023 Samina Finney, PT GP 1            PT G-Codes  Outcome Measure Options: Modified Oswestry  Total: (P) 10  Modified Oswestry Score/Comments: 50% disability         Samina Finney, PT  8/31/2023

## 2023-09-07 ENCOUNTER — TELEPHONE (OUTPATIENT)
Dept: PAIN MEDICINE | Facility: CLINIC | Age: 63
End: 2023-09-07
Payer: COMMERCIAL

## 2023-09-08 ENCOUNTER — PREP FOR SURGERY (OUTPATIENT)
Dept: SURGERY | Facility: SURGERY CENTER | Age: 63
End: 2023-09-08
Payer: COMMERCIAL

## 2023-09-08 ENCOUNTER — OFFICE VISIT (OUTPATIENT)
Dept: PAIN MEDICINE | Facility: CLINIC | Age: 63
End: 2023-09-08
Payer: COMMERCIAL

## 2023-09-08 VITALS
HEART RATE: 88 BPM | OXYGEN SATURATION: 92 % | TEMPERATURE: 97.3 F | WEIGHT: 180.2 LBS | DIASTOLIC BLOOD PRESSURE: 60 MMHG | SYSTOLIC BLOOD PRESSURE: 100 MMHG | BODY MASS INDEX: 28.96 KG/M2 | HEIGHT: 66 IN | RESPIRATION RATE: 12 BRPM

## 2023-09-08 DIAGNOSIS — M51.36 DDD (DEGENERATIVE DISC DISEASE), LUMBAR: ICD-10-CM

## 2023-09-08 DIAGNOSIS — M54.16 LUMBAR RADICULOPATHY: Primary | ICD-10-CM

## 2023-09-08 PROCEDURE — 99214 OFFICE O/P EST MOD 30 MIN: CPT

## 2023-09-08 NOTE — PATIENT INSTRUCTIONS
---  Indications for epidural injection:  Plan is to proceed with epidural at the appropriate level.  If the patient receives significant pain reduction and improvement in function and the plan will be to repeat the epidural when the pain worsens.  If a second epidural provides at least 6 weeks of sustained improvement that includes both pain reduction and improvement in function then an epidural injection could be repeated once again at the same level.  This is a mutual decision between the clinician and the patient that includes discussions including risks and benefits in detail as well as alternative therapies.  Patient's questions were answered to their satisfaction and to their understanding.  ---

## 2023-09-08 NOTE — PROGRESS NOTES
CHIEF COMPLAINT  Back pain    Subjective   Ro Perez is a 63 y.o. female.   She presents to the office for evaluation of low back pain. She was referred here by James Epley, APRN.     Back pain started ~15 years ago, no inciting trauma or event. History of osteoporosis. Pain has been waxing and waning since onset.     Today pain is 6/10VAS in severity. Pain is located in her low back (left side worse than right) and radiates into left anterior thigh stopping at the knee. Describes this pain as a nearly continuous ache with intermittent shooting pains. Pain is worsened by walking long distances, prolonged sitting or standing, rising from sitting to standing position, and bending/twisting. Pain improve with rest/reposition, heat, and OTC Aleve. She has completed 1 PT session and has two more sessions planned.     Patient works at [employer]"" during the day and [employer] during the evening.    Past therapies:  Physical Therapy: Yes   Chiropractor: Yes - many years  Massage Therapy: No  TENS: No  Neck or back surgery: No  Past pain management: No    Back Pain  This is a chronic problem. The current episode started more than 1 year ago. The problem occurs constantly. The problem has been gradually worsening since onset. The quality of the pain is described as shooting and aching. The pain radiates to the left thigh (left anterior thigh stopping at the knee). The pain is at a severity of 6/10. The pain is moderate. The pain is The same all the time. The symptoms are aggravated by position, sitting and standing (walking long distances,). Pertinent negatives include no abdominal pain, chest pain, dysuria, headaches, numbness or weakness. She has tried chiropractic manipulation, heat and NSAIDs (PT,) for the symptoms. The treatment provided mild relief.      PEG Assessment   What number best describes your pain on average in the past week?9  What number best describes how, during the past week, pain has interfered with your enjoyment of  life?9  What number best describes how, during the past week, pain has interfered with your general activity?  9      Current Outpatient Medications:     albuterol sulfate  (90 Base) MCG/ACT inhaler, TAKE 2 PUFFS BY MOUTH EVERY 4 HOURS AS NEEDED FOR WHEEZE, Disp: 6.7 g, Rfl: 2    ALPRAZolam (Xanax) 0.5 MG tablet, One half to one tablet daily as needed for anxiety, caution sedation do not drive., Disp: 45 tablet, Rfl: 0    DULoxetine (CYMBALTA) 60 MG capsule, Take 1 capsule by mouth 2 (Two) Times a Day., Disp: 180 capsule, Rfl: 1    fenofibrate (Tricor) 145 MG tablet, Take 1 tablet by mouth Daily., Disp: 90 tablet, Rfl: 2    finasteride (PROPECIA) 1 MG tablet, , Disp: , Rfl:     mirtazapine (REMERON) 15 MG tablet, Take 1 tablet by mouth every night at bedtime., Disp: 90 tablet, Rfl: 1    Multiple Vitamins-Minerals (CENTRUM SILVER 50+WOMEN PO), Take 1 tablet by mouth Daily., Disp: , Rfl:     naproxen sodium (ALEVE) 220 MG tablet, Take 1 tablet by mouth 2 (Two) Times a Day As Needed., Disp: , Rfl:     Semaglutide,0.25 or 0.5MG/DOS, (Ozempic, 0.25 or 0.5 MG/DOSE,) 2 MG/3ML solution pen-injector, Inject 0.25 mg under the skin into the appropriate area as directed 1 (One) Time Per Week., Disp: 9 mL, Rfl: 0    The following portions of the patient's history were reviewed and updated as appropriate: allergies, current medications, past family history, past medical history, past social history, past surgical history, and problem list.    REVIEW OF PERTINENT MEDICAL DATA  Reviewed office note from James Epley, APRN from 7/19/2023.  Patient presents with complaints of chronic low back pain that has worsened over the last few months to a year.  She reports that pain is really affecting her life.  Recent MRI shows disc bulging at L3-L4 as well as facet disease.  No bowel or bladder dysfunction.  She states she is willing to go to PT.  Patient would like to a pain management referral to consider injections.    MRI OF THE  LUMBAR SPINE WITHOUT CONTRAST     CLINICAL HISTORY:Chronic low back pain with bilateral sciatica.     TECHNIQUE: MRI of the lumbar spine was obtained with sagittal T1,  proton-density, and T2-weighted images. Additionally, there are coronal  T2 as well as axial T1 and T2-weighted images through the lumbar spine.     COMPARISON:No previous MRIs examinations of the lumbar spine are  available for comparison.     FINDINGS:     There is mild-to-moderate dextroconvex scoliotic curvature of the lumbar  spine with its apex centered at L2-3. Advanced degenerative disc changes  are seen along the inner margins of the scoliotic curvature along the  left aspect of the L1-2 and L2-3 disc spaces.     The conus medullaris terminates at the level of the L1-2 disc space and  has normal signal intensity. The visualized distal thoracic spinal cord  is unremarkable in appearance.     At L1-2, there is degenerative retrolisthesis of L1 on L2 by  approximately 2 mm. There is no significant canal or foraminal stenosis  at L1-2.     At L2-3, there is a disc osteophyte complex eccentric to left which  minimally indents the ventral subarachnoid space. There is no  significant degree of foraminal narrowing. Degenerative disc changes are  prominently seen along the aspect of the L2-3 disc space along with  adjacent degenerative endplate marrow changes.     At L3-4, there is bulging disc material eccentric to left which mildly  narrows the left neural foramen in conjunction with mild facet  arthropathy.     At L4-5 and L5-S1, there is mild facet hypertrophic change but no  significant canal or foraminal narrowing is noted.     IMPRESSION:     There is mild-to-moderate dextroconvex scoliotic curvature of the lumbar  spine with its apex centered at L2-3. Advanced degenerative disc changes  are seen along the left aspect of the L1-2 and L2-3 disc spaces.  Degenerative endplate marrow changes are seen at the L2-3 level.     Only a minimal degree  "of canal narrowing is seen at L2-3 secondary to a  disc osteophyte complex eccentric to the left. A mild degree of left  foraminal narrowing is seen at L3-4 secondary to bulging disc material  and facet hypertrophic change.     The remaining degenerative phenomena within the lumbar spine are as  discussed in detail above.     This report was finalized on 7/5/2023 9:53 AM by Dr. Kali Pimentel M.D.     Review of Systems   Constitutional:  Negative for activity change (less) and fatigue.   HENT:  Negative for congestion.    Respiratory:  Negative for cough and chest tightness.    Cardiovascular:  Negative for chest pain.   Gastrointestinal:  Negative for abdominal pain, constipation and diarrhea.   Genitourinary:  Negative for difficulty urinating and dysuria.   Musculoskeletal:  Positive for back pain.   Neurological:  Negative for dizziness, weakness, light-headedness, numbness and headaches.   Psychiatric/Behavioral:  Negative for agitation, sleep disturbance and suicidal ideas. The patient is not nervous/anxious.      I have reviewed and confirmed the accuracy of the ROS as documented by the MA/LPN/RN VINH Phillips    Vitals:    09/08/23 1409   BP: 100/60   BP Location: Left arm   Patient Position: Sitting   Cuff Size: Large Adult   Pulse: 88   Resp: 12   Temp: 97.3 °F (36.3 °C)   TempSrc: Temporal   SpO2: 92%   Weight: 81.7 kg (180 lb 3.2 oz)   Height: 167.6 cm (65.98\")   PainSc:   6     Objective     Physical Exam  Constitutional:       Appearance: Normal appearance.   HENT:      Head: Normocephalic.   Cardiovascular:      Rate and Rhythm: Normal rate and regular rhythm.   Pulmonary:      Effort: Pulmonary effort is normal.      Breath sounds: Normal breath sounds.   Musculoskeletal:      Cervical back: Normal range of motion.      Lumbar back: Tenderness (left side > right side) present. Positive left straight leg raise test. Negative right straight leg raise test.      Comments: + lumbar facet " loading/tenderness   Skin:     General: Skin is warm and dry.      Capillary Refill: Capillary refill takes less than 2 seconds.   Neurological:      General: No focal deficit present.      Mental Status: She is alert and oriented to person, place, and time.   Psychiatric:         Mood and Affect: Mood normal.         Behavior: Behavior normal.         Thought Content: Thought content normal.         Cognition and Memory: Cognition normal.     Assessment & Plan   Diagnoses and all orders for this visit:    1. Lumbar radiculopathy (Primary)    2. DDD (degenerative disc disease), lumbar    --- L3/L4 LESI with left paramedian approach   ---  Indications for epidural injection:  Plan is to proceed with epidural at the appropriate level.  If the patient receives significant pain reduction and improvement in function and the plan will be to repeat the epidural when the pain worsens.  If a second epidural provides at least 6 weeks of sustained improvement that includes both pain reduction and improvement in function then an epidural injection could be repeated once again at the same level.  This is a mutual decision between the clinician and the patient that includes discussions including risks and benefits in detail as well as alternative therapies.  Patient's questions were answered to their satisfaction and to their understanding.  ---  --- Follow-up for procedure    --- Ro Perez reports a pain score of 6.  Given her pain assessment as noted, treatment options were discussed and the following options were decided upon as a follow-up plan to address the patient's pain: patient scheduled for LESI.         Pain / Disability Scale  The scale used for measurement of pain and/or disability for this patient was the [place] back pain disability scale.  The score was 58 on 09/08/2023    MANJULA REPORT  As the clinician, I personally reviewed the MANJULA from 9/8/23 while the patient was in the office today.    Dictated utilizing  Dianna dictation.

## 2023-09-20 ENCOUNTER — HOSPITAL ENCOUNTER (OUTPATIENT)
Dept: PHYSICAL THERAPY | Facility: HOSPITAL | Age: 63
Setting detail: THERAPIES SERIES
Discharge: HOME | End: 2023-09-20
Payer: COMMERCIAL

## 2023-09-20 DIAGNOSIS — M54.50 CHRONIC BILATERAL LOW BACK PAIN WITHOUT SCIATICA: Primary | ICD-10-CM

## 2023-09-20 DIAGNOSIS — G89.29 CHRONIC BILATERAL LOW BACK PAIN WITHOUT SCIATICA: Primary | ICD-10-CM

## 2023-09-20 DIAGNOSIS — R26.2 DIFFICULTY WALKING: ICD-10-CM

## 2023-09-20 PROCEDURE — 97110 THERAPEUTIC EXERCISES: CPT

## 2023-09-20 RX ORDER — ALBUTEROL SULFATE 90 UG/1
AEROSOL, METERED RESPIRATORY (INHALATION)
Qty: 6.7 G | Refills: 2 | Status: SHIPPED | OUTPATIENT
Start: 2023-09-20

## 2023-09-20 NOTE — TELEPHONE ENCOUNTER
Rx Refill Note  Requested Prescriptions     Pending Prescriptions Disp Refills    albuterol sulfate  (90 Base) MCG/ACT inhaler [Pharmacy Med Name: ALBUTEROL HFA (PROVENTIL) INH]  2     Sig: TAKE 2 PUFFS BY MOUTH EVERY 4 HOURS AS NEEDED FOR WHEEZE      Last office visit with prescribing clinician: 7/19/2023   Last telemedicine visit with prescribing clinician: Visit date not found   Next office visit with prescribing clinician: Visit date not found                         Would you like a call back once the refill request has been completed: [] Yes [] No    If the office needs to give you a call back, can they leave a voicemail: [] Yes [] No    Tay Almaraz MA  09/20/23, 09:25 EDT

## 2023-09-20 NOTE — THERAPY TREATMENT NOTE
Outpatient Physical Therapy Ortho Treatment Note  [facility]     Patient Name: Ro Perez  : 1960  MRN: [id number]  Today's Date: 2023      Visit Date: 2023    Visit Dx:    ICD-10-CM ICD-9-CM   1. Chronic bilateral low back pain without sciatica  M54.50 724.2    G89.29 338.29   2. Difficulty walking  R26.2 719.7       Patient Active Problem List   Diagnosis    Anxiety    Abnormal finding on mammography    Osteoarthritis of hip    Vitamin D deficiency    Hyperlipidemia    Elevated blood pressure (not hypertension)    Health maintenance examination    High risk medication use    Family history of breast cancer    Family history of polyps in the colon    Anal skin tag    Osteoporosis without current pathological fracture    Tobacco abuse        Past Medical History:   Diagnosis Date    Abnormal finding on mammography 2016    Anemia     Anxiety     Benign breast lumps     Bloating     Bursitis of shoulder, right 2019    Cervical disc disorder     Chronic left-sided low back pain without sciatica     Chronic right shoulder pain     Colon polyps     FOLLOWED BY DR. CATE DIAZ (dyspnea on exertion) 2018    Extremity pain     Fatigue     Fractures     Left Ankle    Headache     Headache, tension-type     High risk medication use 2018    Hyperlipidemia     MIXED    Insomnia     Joint pain     Knees    Kidney stones     Migraine     OA (osteoarthritis)     Osteopenia     Postmenopausal     Skin tag of anus     Urgency incontinence 2016    Vitamin D deficiency     Weakness of shoulder     RIGHT SHOULDER    Weight gain         Past Surgical History:   Procedure Laterality Date     SECTION N/A 1997     SECTION N/A 2000    COLONOSCOPY N/A 2020    3 CM LESION IN PERIANAL-SUSPECTED CONDYLOMA, MULTIPLE MEDIUM DIVERTICULA IN SIGMOID, 3 HYPERPLASTIC POLYPS IN RECTUM, MEDIUM INTERNAL HEMORRHOIDS, DR. CATE LANG  AT [facility]      EYE SURGERY  July 2005    Lasik    HEMORRHOIDECTOMY N/A 09/17/2020    Procedure: RIGHT POSTERIOR ANAL TAG EXCISION;  Surgeon: Tessa Ball MD;  Location: [facility] OR;  Service: General;  Laterality: N/A;    LASER ABLATION  2002    LASIK Bilateral                         PT Assessment/Plan       Row Name 09/20/23 1400          PT Assessment    Assessment Comments Ms. Perez returns for first follow up from initial evaluation for L>R LBP of chronic nature. Reports limited compliance with HEP secondary to mother being sick and ultimately passing away last week. She verbalizes primary goal in PT on establishing HEP and scheduled for TAMIE 11/1/2023. Reviewed initial program with cueing to prevent posterior trunk rotation with  S/L clamshell and added side-stepping, shoulder ext + TrA and open book all with good tolerance. Updated HEP. Plans to trial independently and will return after several weeks if questions arise.  -        PT Plan    PT Plan Comments progress HEP as indicated  -               User Key  (r) = Recorded By, (t) = Taken By, (c) = Cosigned By      Initials Name Provider Type     Monika Vu, PT Physical Therapist                       OP Exercises       Row Name 09/20/23 1400             Subjective    Subjective Comments I havent been real good about doing my exercises. My mom was sick in the hospital, then nursing home and then back to hospital and she passed last week.  -         Total Minutes    16360 - PT Therapeutic Exercise Minutes 44  -MH         Exercise 1    Exercise Name 1 NuStep  -MH      Cueing 1 Verbal  -MH      Time 1 5 min  -MH         Exercise 2    Exercise Name 2 LTR  -MH      Cueing 2 Verbal;Demo  -MH      Reps 2 10ea  -MH         Exercise 3    Exercise Name 3 piriformis stretch  -MH      Cueing 3 Verbal  -MH      Reps 3 3e  -MH      Time 3 20s  -MH         Exercise 4    Exercise Name 4 sl clamshell with band  -MH      Cueing 4 Verbal;Demo  -MH      Sets 4 2   -MH      Reps 4 10  -MH      Time 4 RTB  -MH         Exercise 5    Exercise Name 5 STS  -MH      Cueing 5 Verbal;Demo  -MH      Sets 5 2  -MH      Reps 5 10  -MH      Time 5 RTB at thighs  -MH         Exercise 6    Exercise Name 6 PPT  -MH      Cueing 6 Verbal  -MH      Reps 6 15  -MH      Time 6 5  -MH         Exercise 7    Exercise Name 7 beginner bridge  -MH      Cueing 7 Verbal  -MH      Sets 7 2  -MH      Reps 7 10  -MH      Additional Comments cueing for PPT to initiate  -MH         Exercise 8    Exercise Name 8 SLS  -MH      Cueing 8 Verbal;Demo  -MH      Reps 8 2e  -MH      Time 8 15s  -MH      Additional Comments cues for level pelvis and slight knee bend  -MH         Exercise 9    Exercise Name 9 S/L open book  -MH      Cueing 9 Verbal;Demo  -MH      Reps 9 10e  -MH      Time 9 5s  -MH         Exercise 10    Exercise Name 10 shoulder ext + TrA  -MH      Cueing 10 Verbal;Demo  -MH      Reps 10 15  -MH      Time 10 5s  -MH         Exercise 11    Exercise Name 11 side-stepping  -MH      Cueing 11 Verbal;Demo  -MH      Reps 11 3 laps // bars  -MH      Time 11 RTB below knees  -                User Key  (r) = Recorded By, (t) = Taken By, (c) = Cosigned By      Initials Name Provider Type    Monika Mckeon, PT Physical Therapist                                  PT OP Goals       Row Name 09/20/23 1400          PT Short Term Goals    STG Date to Achieve 10/15/23  -     STG 1 The pt will report at least 40% reduction in LLE neural symptoms for improved walking tolerance.  -     STG 1 Progress Ongoing  -        Long Term Goals    LTG Date to Achieve 11/14/23  -     LTG 1 The pt will demonstrate IND and compliant with progressive HEP focused on IND condition management and return to PLOF.  -     LTG 1 Progress Ongoing  -     LTG 2 The pt will score less than or equal to 35% disability on the modified Oswestry to indicate improved perceived performance of ADLs.  -     LTG 2 Progress Ongoing  -      LTG 3 The pt will ambulate with near normal gait pattern for 15 min prior to increased pain for improved functional activity tolerance.  -     LTG 3 Progress Ongoing  -               User Key  (r) = Recorded By, (t) = Taken By, (c) = Cosigned By      Initials Name Provider Type     Monika Vu PT Physical Therapist                    Therapy Education  Education Details: Updated HEP Access Code: Access Code [email or web address]  Given: HEP, Symptoms/condition management  Program: Reinforced, Progressed  How Provided: Demonstration, Verbal, Written  Provided to: Patient  Level of Understanding: Verbalized, Demonstrated              Time Calculation:   Start Time: 1415  Stop Time: 1459  Time Calculation (min): 44 min  Total Timed Code Minutes- PT: 44 minute(s)  Timed Charges  10103 - PT Therapeutic Exercise Minutes: 44  Total Minutes  Timed Charges Total Minutes: 44   Total Minutes: 44  Therapy Charges for Today       Code Description Service Date Service Provider Modifiers Qty    [id number] HC PT THER PROC EA 15 MIN 9/20/2023 Monika Vu, PT GP 3                      Monika Vu PT  9/20/2023

## 2023-09-26 ENCOUNTER — TRANSCRIBE ORDERS (OUTPATIENT)
Dept: SURGERY | Facility: SURGERY CENTER | Age: 63
End: 2023-09-26
Payer: COMMERCIAL

## 2023-09-26 DIAGNOSIS — Z41.9 SURGERY, ELECTIVE: Primary | ICD-10-CM

## 2023-09-26 PROBLEM — M54.16 LUMBAR RADICULOPATHY: Status: ACTIVE | Noted: 2023-09-26

## 2023-09-29 RX ORDER — SEMAGLUTIDE 0.68 MG/ML
0.25 INJECTION, SOLUTION SUBCUTANEOUS WEEKLY
Qty: 9 ML | Refills: 0 | Status: SHIPPED | OUTPATIENT
Start: 2023-09-29

## 2023-09-29 NOTE — TELEPHONE ENCOUNTER
Rx Refill Note  Requested Prescriptions     Pending Prescriptions Disp Refills    Semaglutide,0.25 or 0.5MG/DOS, (Ozempic, 0.25 or 0.5 MG/DOSE,) 2 MG/3ML solution pen-injector 9 mL 0     Sig: Inject 0.25 mg under the skin into the appropriate area as directed 1 (One) Time Per Week.      Last office visit with prescribing clinician: 7/19/2023   Last telemedicine visit with prescribing clinician: Visit date not found   Next office visit with prescribing clinician: Visit date not found                         Would you like a call back once the refill request has been completed: [] Yes [] No    If the office needs to give you a call back, can they leave a voicemail: [] Yes [] No    Suzan Grant Rep  09/29/23, 14:35 EDT

## 2023-10-23 RX ORDER — ALBUTEROL SULFATE 90 UG/1
2 AEROSOL, METERED RESPIRATORY (INHALATION) EVERY 4 HOURS PRN
Qty: 8.5 G | Refills: 2 | Status: SHIPPED | OUTPATIENT
Start: 2023-10-23

## 2023-10-23 NOTE — TELEPHONE ENCOUNTER
Rx Refill Note  Requested Prescriptions     Pending Prescriptions Disp Refills    albuterol sulfate  (90 Base) MCG/ACT inhaler [Pharmacy Med Name: ALBUTEROL HFA (PROAIR) INHALER]  2     Sig: INHALE 2 PUFFS BY MOUTH EVERY 4 HOURS AS NEEDED FOR WHEEZE      Last office visit with prescribing clinician: 7/19/2023   Last telemedicine visit with prescribing clinician: Visit date not found   Next office visit with prescribing clinician: Visit date not found                         Would you like a call back once the refill request has been completed: [] Yes [] No    If the office needs to give you a call back, can they leave a voicemail: [] Yes [] No    Suzan Grant Rep  10/23/23, 12:13 EDT

## 2023-10-31 RX ORDER — SODIUM CHLORIDE 0.9 % (FLUSH) 0.9 %
10 SYRINGE (ML) INJECTION EVERY 12 HOURS SCHEDULED
Status: DISCONTINUED | OUTPATIENT
Start: 2023-10-31 | End: 2023-11-01 | Stop reason: HOSPADM

## 2023-10-31 RX ORDER — MIDAZOLAM HYDROCHLORIDE 2 MG/2ML
4 INJECTION, SOLUTION INTRAMUSCULAR; INTRAVENOUS ONCE
Status: DISCONTINUED | OUTPATIENT
Start: 2023-11-01 | End: 2023-11-01 | Stop reason: HOSPADM

## 2023-11-01 ENCOUNTER — HOSPITAL ENCOUNTER (OUTPATIENT)
Dept: GENERAL RADIOLOGY | Facility: SURGERY CENTER | Age: 63
Setting detail: HOSPITAL OUTPATIENT SURGERY
End: 2023-11-01
Payer: COMMERCIAL

## 2023-11-01 ENCOUNTER — HOSPITAL ENCOUNTER (OUTPATIENT)
Facility: SURGERY CENTER | Age: 63
Setting detail: HOSPITAL OUTPATIENT SURGERY
Discharge: HOME | End: 2023-11-01
Attending: ANESTHESIOLOGY | Admitting: ANESTHESIOLOGY
Payer: COMMERCIAL

## 2023-11-01 VITALS
WEIGHT: 166 LBS | TEMPERATURE: 97.8 F | BODY MASS INDEX: 26.06 KG/M2 | RESPIRATION RATE: 16 BRPM | HEART RATE: 86 BPM | DIASTOLIC BLOOD PRESSURE: 79 MMHG | HEIGHT: 67 IN | OXYGEN SATURATION: 95 % | SYSTOLIC BLOOD PRESSURE: 145 MMHG

## 2023-11-01 DIAGNOSIS — Z41.9 SURGERY, ELECTIVE: ICD-10-CM

## 2023-11-01 DIAGNOSIS — M54.16 LUMBAR RADICULOPATHY: ICD-10-CM

## 2023-11-01 PROCEDURE — 77002 NEEDLE LOCALIZATION BY XRAY: CPT

## 2023-11-01 PROCEDURE — 62323 NJX INTERLAMINAR LMBR/SAC: CPT | Performed by: ANESTHESIOLOGY

## 2023-11-01 PROCEDURE — 25010000002 METHYLPREDNISOLONE PER 80 MG: Performed by: ANESTHESIOLOGY

## 2023-11-01 PROCEDURE — 76000 FLUOROSCOPY <1 HR PHYS/QHP: CPT

## 2023-11-01 PROCEDURE — 25510000001 IOPAMIDOL 61 % SOLUTION 30 ML VIAL: Performed by: ANESTHESIOLOGY

## 2023-11-01 PROCEDURE — 25010000002 BUPIVACAINE (PF) 0.5 % SOLUTION 10 ML VIAL: Performed by: ANESTHESIOLOGY

## 2023-11-01 NOTE — OP NOTE
Lumbar Epidural Steroid Injection  [facility]    PREOPERATIVE DIAGNOSIS:   Spondylolisthesis and left Lumbar Radiculopathy  POSTOPERATIVE DIAGNOSIS:  Same as preop diagnosis    PROCEDURE:   Lumbar Epidural Steroid Injection, Therapeutic Translaminar Injection, with epidurogram, at  L3/L4 level    PRE-PROCEDURE DISCUSSION WITH PATIENT:    Risks and complications were discussed with the patient prior to starting the procedure and informed consent was obtained.  We discussed various topics including but not limited to bleeding, infection, injury, paralysis, nerve injury, dural puncture, coma, death, worsening of clinical picture, lack of pain relief, and postprocedural soreness.    SURGEON:  Raul Moreira MD    REASON FOR PROCEDURE:    Degenerative changes are noted in the area., Stenotic area is noted, and is likely contributing to this chronic &/or recurrent pain. , and Radiating pattern of pain is likely consistent with degenerative changes in the area.    SEDATION:  Patient declined administration of moderate sedation    ANESTHETIC:  Marcaine 0.25%  STEROID:   Methylprednisolone (DEPO MEDROL) 80mg/ml    DESCRIPTON OF PROCEDURE:    After obtaining informed consent, I.V. was not started in the preop area.   The patient was taken to the operating room and placed in the prone position.  EKG, blood pressure, and pulse oximeter were monitored throughout, and sedation was provided as needed by the RN under my guidance. All pressure points were well padded.  The lumbar spine area was prepped with Chloraprep and draped in a sterile fashion.  Under fluoroscopic guidance, the above mentioned interlaminar space was identified. Skin and subcutaneous tissues were anesthetized with 1% lidocaine in the middle of the space. A Tuohy needle was introduced through the skin and advanced to this interlaminar space and into the epidural space under fluoroscopic guidance and verified with loss-of-resistance  technique to air.  After confirming the position of the needle with the fluoroscope with all the views, and after aspiration was confirmed negative for blood and CSF, 1.5 mL of Omnipaque was injected.  After seeing appropriate epidurogram with lateral and PA views, a total of 3 cc solution was injected, consisting of 1cc of local anesthetic as above, with normal saline and injectable steroid as above.     ESTIMATED BLOOD LOSS:  <5 mL  SPECIMENS:  None    COMPLICATIONS:     No complications were noted., There was no indication of vascular uptake on live injection of contrast dye., There was no indication of intrathecal uptake on live injection of contrast dye., There was not any evidence of dural puncture.  , and The patient did not have any signs of postprocedure numbness nor weakness.    TOLERANCE & DISCHARGE CONDITION:    The patient tolerated the procedure well.  The patient was transported to the recovery area without difficulties.  The patient was discharged to home under the care of family in stable and satisfactory condition.    PLAN OF CARE:  The patient was given our standard instruction sheet.  The patient will Return to clinic 3-4 wks -- this procedure was extremely technically difficult due to listhesis.  If we need to consider other options then consider transforaminal approach rather than interlaminar.  The patient will resume all medications as per the medication reconciliation sheet.

## 2023-11-01 NOTE — DISCHARGE INSTRUCTIONS
[facility] - Post-procedure Instructions          --  While there are no absolute restrictions, it is recommended that you do not perform strenuous activity today. In the morning, you may resume your level of activity as before your block.    --  If you have a band-aid at your injection site, please remove it later today. Observe the area for any redness, swelling, pus-like drainage, or a temperature over 101°. If any of these symptoms occur, please call your doctor at [phone]. If after office hours, leave a message and the on-call provider will return your call.    --  Ice may be applied to your injection site. It is recommended you avoid direct heat (heating pad; hot tub) for 1-2 days.    --  Call [facility] at [phone] if you experience persistent headache, persistent bleeding from the injection site, or severe pain not relieved by heat or oral medication.    --  Do not make important decisions today.    --  Due to the effects of the block and/or the I.V. Sedation, DO NOT drive or operate hazardous machinery for 12 hours.  Local anesthetics may cause numbness after procedure and precautions must be taken with regards to operating equipment as well as with walking, even if ambulating with assistance of another person or with an assistive device.    --  Do not drink alcohol for 12 hours.    -- You may return to work tomorrow, or as directed by your referring doctor.    --  Occasionally you may notice a slight increase in your pain after the procedure. This should start to improve within the next 24-48 hours. Radiofrequency ablation procedure pain may last 3-4 weeks.    --  It may take as long as 3-4 days before you notice a gradual improvement in your pain and/or other symptoms.    -- You may continue to take your prescribed pain medication as needed.    --  Some normal possible side effects of steroid use could include fluid retention, increased blood sugar, dull headache,  increased sweating, increased appetite, mood swings and flushing.    --  Diabetics are recommended to watch their blood glucose level closely for 24-48 hours after the injection.    --  Must stay in PACU for 20 min upon arrival and prove no leg weakness before being discharged.    --  IN THE EVENT OF A LIFE THREATENING EMERGENCY, (CHEST PAIN, BREATHING DIFFICULTIES, PARALYSIS…) YOU SHOULD GO TO YOUR NEAREST EMERGENCY ROOM.    --  You should be contacted by our office within 2-3 days to schedule follow up or next appointment date.  If not contacted within 7 days, please call the office at [phone]

## 2023-11-01 NOTE — H&P
Brief Pre-procedural / Pre-operative H&P        -----    Pertinent Diagnosis:   Lumbar radiculopathy bilateral left greater than right.    Proposed Procedure: Lumbar epidural steroid injection      Gisele Perez is a 63 y.o. female  who presents for intervention.  She has a history of back pain.      History of Present Illness     Moderate to severe back pain and there is radiating worse on the left and radiating into the left thigh anteriorly approximating L3 dermatome.  Intermittent shooting pains and increasing pain with walking long distance and then prolonged sitting prolonged standing and bending and twisting.  She is quite active and ongoing pain is distracting from many of her daily activities.  There is a noted disc displacement and she has a positive straight leg raise maneuver on the left  -------    The following portions of the patient's history were reviewed and updated as appropriate: allergies, current medications, past family history, past medical history, past social history, past surgical history and problem list.    Allergies   Allergen Reactions    Codeine Hives    Sulfa Antibiotics Rash         Current Facility-Administered Medications:     Midazolam HCl (PF) (VERSED) injection 4 mg, 4 mg, Intravenous, Once, Raul Moreira MD    sodium chloride 0.9 % flush 10 mL, 10 mL, Intravenous, Q12H, Raul Moreira MD    No current facility-administered medications on file prior to encounter.     Current Outpatient Medications on File Prior to Encounter   Medication Sig Dispense Refill    DULoxetine (CYMBALTA) 60 MG capsule Take 1 capsule by mouth 2 (Two) Times a Day. 180 capsule 1    fenofibrate (Tricor) 145 MG tablet Take 1 tablet by mouth Daily. 90 tablet 2    finasteride (PROPECIA) 1 MG tablet       mirtazapine (REMERON) 15 MG tablet Take 1 tablet by mouth every night at bedtime. 90 tablet 1    Multiple Vitamins-Minerals (CENTRUM SILVER 50+WOMEN PO) Take 1 tablet by mouth Daily.       ALPRAZolam (Xanax) 0.5 MG tablet One half to one tablet daily as needed for anxiety, caution sedation do not drive. 45 tablet 0    naproxen sodium (ALEVE) 220 MG tablet Take 1 tablet by mouth 2 (Two) Times a Day As Needed.         Patient Active Problem List   Diagnosis    Anxiety    Abnormal finding on mammography    Osteoarthritis of hip    Vitamin D deficiency    Hyperlipidemia    Elevated blood pressure (not hypertension)    Health maintenance examination    High risk medication use    Family history of breast cancer    Family history of polyps in the colon    Anal skin tag    Osteoporosis without current pathological fracture    Tobacco abuse    Lumbar radiculopathy       Past Medical History:   Diagnosis Date    Abnormal finding on mammography 2016    Anemia     Anxiety     Benign breast lumps     Bloating     Bursitis of shoulder, right 2019    Cervical disc disorder     Chronic left-sided low back pain without sciatica     Chronic right shoulder pain     Colon polyps     FOLLOWED BY DR. CATE DIAZ (dyspnea on exertion) 2018    Extremity pain     Fatigue     Fractures     Left Ankle    Headache     Headache, tension-type     High risk medication use 2018    Hyperlipidemia     MIXED    Insomnia     Joint pain     Knees    Kidney stones     Lumbar radiculopathy 2023    Migraine     OA (osteoarthritis)     Osteopenia     Postmenopausal     Skin tag of anus     Urgency incontinence 2016    Vitamin D deficiency     Weakness of shoulder     RIGHT SHOULDER    Weight gain        Past Surgical History:   Procedure Laterality Date     SECTION N/A 1997     SECTION N/A 2000    COLONOSCOPY N/A 2020    3 CM LESION IN PERIANAL-SUSPECTED CONDYLOMA, MULTIPLE MEDIUM DIVERTICULA IN SIGMOID, 3 HYPERPLASTIC POLYPS IN RECTUM, MEDIUM INTERNAL HEMORRHOIDS, DR. CATE LANG AT [facility]      EYE SURGERY  2005    Lasik     "HEMORRHOIDECTOMY N/A 09/17/2020    Procedure: RIGHT POSTERIOR ANAL TAG EXCISION;  Surgeon: Tessa Ball MD;  [facility];  Service: General;  Laterality: N/A;    LASER ABLATION  2002    LASIK Bilateral        Family History   Problem Relation Age of Onset    Osteoporosis Mother     Cancer Mother     Colon polyps Mother     Breast cancer Mother     Osteoporosis Maternal Grandmother     No Known Problems Father     No Known Problems Sister     No Known Problems Daughter     No Known Problems Daughter     Malig Hyperthermia Neg Hx        Social History     Socioeconomic History    Marital status: Single   Tobacco Use    Smoking status: Every Day     Packs/day: 0.50     Years: 20.00     Additional pack years: 0.00     Total pack years: 10.00     Types: Cigarettes    Smokeless tobacco: Never    Tobacco comments:     last smoke 9/16/20   Substance and Sexual Activity    Alcohol use: No    Drug use: No    Sexual activity: Not Currently     Birth control/protection: Post-menopausal     Comment: .       -------       Review of Systems  No Fever, No Chills, No ear pain, No sinus pressure or drainage, No eye pain or drainage, No cough, No SOB, No chest tightness nor chest pain, no palpitations.          Vitals:    11/01/23 1318   BP: 129/83   BP Location: Left arm   Patient Position: Lying   Pulse: 100   Resp: 18   Temp: 97 °F (36.1 °C)   TempSrc: Temporal   SpO2: 92%   Weight: 75.3 kg (166 lb)   Height: 170.2 cm (67\")         Objective   Physical Exam  VSS, NNR, NCAT, NMNA, NRD, AAOx3.  No lesions    -------    Assessment & Plan:  - as noted above, the stated intervention is indicated  - Follow-up plan will be noted in the operative report        Has a follow-up in a month      EMR Dragon/Transcription disclaimer:   Typed items in this encounter note may have been created by electronic transcription/translation software which converts spoken language to printed text. The electronic translation of " spoken language may permit erroneous, or at times, nonsensical words or phrases to be inadvertently transcribed; Although I have reviewed the note for such errors, some may still exist.

## 2023-11-27 RX ORDER — MIRTAZAPINE 15 MG/1
15 TABLET, FILM COATED ORAL
Qty: 90 TABLET | Refills: 1 | Status: SHIPPED | OUTPATIENT
Start: 2023-11-27

## 2023-11-27 NOTE — TELEPHONE ENCOUNTER
Rx Refill Note  Requested Prescriptions     Pending Prescriptions Disp Refills    mirtazapine (REMERON) 15 MG tablet [Pharmacy Med Name: MIRTAZAPINE 15 MG TABLET] 90 tablet 1     Sig: TAKE 1 TABLET BY MOUTH EVERYDAY AT BEDTIME      Last office visit with prescribing clinician: 7/19/2023   Last telemedicine visit with prescribing clinician: Visit date not found   Next office visit with prescribing clinician: Visit date not found                         Would you like a call back once the refill request has been completed: [] Yes [] No    If the office needs to give you a call back, can they leave a voicemail: [] Yes [] No    Tay Almaraz MA  11/27/23, 13:16 EST

## 2023-11-28 ENCOUNTER — OFFICE VISIT (OUTPATIENT)
Dept: PAIN MEDICINE | Facility: CLINIC | Age: 63
End: 2023-11-28
Payer: COMMERCIAL

## 2023-11-28 VITALS
TEMPERATURE: 97.5 F | BODY MASS INDEX: 25.65 KG/M2 | DIASTOLIC BLOOD PRESSURE: 68 MMHG | HEIGHT: 67 IN | SYSTOLIC BLOOD PRESSURE: 122 MMHG | HEART RATE: 87 BPM | WEIGHT: 163.4 LBS | OXYGEN SATURATION: 95 %

## 2023-11-28 DIAGNOSIS — M54.16 LUMBAR RADICULOPATHY: Primary | ICD-10-CM

## 2023-11-28 DIAGNOSIS — M51.36 DDD (DEGENERATIVE DISC DISEASE), LUMBAR: ICD-10-CM

## 2023-11-28 PROCEDURE — 99212 OFFICE O/P EST SF 10 MIN: CPT

## 2023-11-28 RX ORDER — PREDNISONE 20 MG/1
40 TABLET ORAL DAILY
COMMUNITY
Start: 2023-11-26 | End: 2023-12-01

## 2023-11-28 RX ORDER — AMOXICILLIN AND CLAVULANATE POTASSIUM 875; 125 MG/1; MG/1
1 TABLET, FILM COATED ORAL 2 TIMES DAILY
COMMUNITY
Start: 2023-11-26 | End: 2023-12-06

## 2023-11-28 NOTE — PROGRESS NOTES
CHIEF COMPLAINT  Back pain    Subjective   Ro Perez is a 63 y.o. female  who presents to the office for follow-up of procedure.  She completed a L3-L4 LESI   on 11/1/23 performed by Dr. Moreira for management of low back pain. Patient reports 100% ongoing relief from the procedure. She reports that she has been able to stand and walk for longer periods of time without experiencing pain.     Today pain is 0/10VAS in severity. When pain is present is located in her low back (left side worse than right). She continues to report intermittent pain in her left anterior thigh that stops at the knee. Describes this pain as an intermittent ache. Pain is worsened by walking long distances, prolonged sitting or standing, rising from sitting to standing position, and bending/twisting. Pain improve with rest/reposition, heat, and OTC Aleve. She has completed PT since her last office visit with mixed results.      Patient works at [employer].     Procedures:  11/1/23 - L3-L3 LESI - 100% ongoing relief    Back Pain  This is a chronic problem. The current episode started more than 1 year ago. The problem occurs constantly. The problem has been gradually worsening since onset. The quality of the pain is described as shooting and aching. The pain radiates to the left thigh (left anterior thigh stopping at the knee). The pain is at a severity of 0/10 (severity in pain varies based on activity level). The pain is moderate. The pain is The same all the time. The symptoms are aggravated by position, sitting and standing (walking long distances,). Associated symptoms include numbness (left leg). Pertinent negatives include no abdominal pain, chest pain, dysuria, fever, headaches or weakness. She has tried chiropractic manipulation, heat and NSAIDs (PT,) for the symptoms. The treatment provided mild relief.      PEG Assessment   What number best describes your pain on average in the past week?3  What number best describes how, during the past  "week, pain has interfered with your enjoyment of life?0  What number best describes how, during the past week, pain has interfered with your general activity?  0    Review of Pertinent Medical Data ---      The following portions of the patient's history were reviewed and updated as appropriate: allergies, current medications, past family history, past medical history, past social history, past surgical history, and problem list.    Review of Systems   Constitutional:  Negative for activity change, chills, fatigue and fever.   HENT:  Negative for congestion.    Eyes:  Negative for visual disturbance.   Respiratory:  Positive for shortness of breath. Negative for chest tightness.    Cardiovascular:  Negative for chest pain.   Gastrointestinal:  Negative for abdominal pain, constipation and diarrhea.   Genitourinary:  Negative for difficulty urinating, dyspareunia and dysuria.   Musculoskeletal:  Positive for back pain.   Neurological:  Positive for numbness (left leg). Negative for dizziness, weakness, light-headedness and headaches.   Psychiatric/Behavioral:  Negative for agitation and sleep disturbance. The patient is not nervous/anxious.      I have reviewed and confirmed the accuracy of the ROS as documented by the MA/LPN/RN VINH Phillips     Vitals:    11/28/23 1529   BP: 122/68   Pulse: 87   Temp: 97.5 °F (36.4 °C)   SpO2: 95%   Weight: 74.1 kg (163 lb 6.4 oz)   Height: 170.2 cm (67\")   PainSc: 0-No pain     Objective   Physical Exam  Constitutional:       Appearance: Normal appearance.   HENT:      Head: Normocephalic.   Cardiovascular:      Rate and Rhythm: Normal rate and regular rhythm.   Pulmonary:      Effort: Pulmonary effort is normal.      Breath sounds: Normal breath sounds.   Musculoskeletal:      Cervical back: Normal range of motion.      Lumbar back: No tenderness. Negative right straight leg raise test and negative left straight leg raise test.   Skin:     General: Skin is warm and dry. "      Capillary Refill: Capillary refill takes less than 2 seconds.   Neurological:      General: No focal deficit present.      Mental Status: She is alert and oriented to person, place, and time.   Psychiatric:         Mood and Affect: Mood normal.         Behavior: Behavior normal.         Thought Content: Thought content normal.         Cognition and Memory: Cognition normal.       Assessment & Plan   Diagnoses and all orders for this visit:    1. Lumbar radiculopathy (Primary)    2. DDD (degenerative disc disease), lumbar      Ro Perez reports a pain score of 0.  Given her pain assessment as noted, treatment options were discussed and the following options were decided upon as a follow-up plan to address the patient's pain:  follow up as needed .    --- Follow-up as needed for increased pain and/or to repeat procedures       MANJULA REPORT  As the clinician, I personally reviewed the MANJULA from 11/28/23 while the patient was in the office today.    Dictated utilizing Dragon dictation.

## 2023-12-11 RX ORDER — SEMAGLUTIDE 0.68 MG/ML
0.5 INJECTION, SOLUTION SUBCUTANEOUS WEEKLY
Qty: 9 ML | Refills: 0 | Status: SHIPPED | OUTPATIENT
Start: 2023-12-11

## 2024-01-15 ENCOUNTER — HOSPITAL ENCOUNTER (OUTPATIENT)
Dept: ULTRASOUND IMAGING | Facility: HOSPITAL | Age: 64
Discharge: HOME | End: 2024-01-15
Payer: COMMERCIAL

## 2024-01-15 ENCOUNTER — HOSPITAL ENCOUNTER (OUTPATIENT)
Dept: NUCLEAR MEDICINE | Facility: HOSPITAL | Age: 64
Discharge: HOME | End: 2024-01-15
Payer: COMMERCIAL

## 2024-01-15 DIAGNOSIS — R79.89 ELEVATED LIVER FUNCTION TESTS: ICD-10-CM

## 2024-01-15 DIAGNOSIS — R74.8 ALKALINE PHOSPHATASE ELEVATION: ICD-10-CM

## 2024-01-15 PROCEDURE — 78306 BONE IMAGING WHOLE BODY: CPT

## 2024-01-15 PROCEDURE — A9503 TC99M MEDRONATE: HCPCS | Performed by: NURSE PRACTITIONER

## 2024-01-15 PROCEDURE — 76705 ECHO EXAM OF ABDOMEN: CPT

## 2024-01-15 PROCEDURE — 0 TECHNETIUM MEDRONATE KIT: Performed by: NURSE PRACTITIONER

## 2024-01-15 RX ORDER — TC 99M MEDRONATE 20 MG/10ML
23.8 INJECTION, POWDER, LYOPHILIZED, FOR SOLUTION INTRAVENOUS
Status: COMPLETED | OUTPATIENT
Start: 2024-01-15 | End: 2024-01-15

## 2024-01-15 RX ADMIN — Medication 23.8 MILLICURIE: at 06:53

## 2024-01-22 ENCOUNTER — OFFICE VISIT (OUTPATIENT)
Dept: PAIN MEDICINE | Facility: CLINIC | Age: 64
End: 2024-01-22
Payer: COMMERCIAL

## 2024-01-22 ENCOUNTER — PREP FOR SURGERY (OUTPATIENT)
Dept: SURGERY | Facility: SURGERY CENTER | Age: 64
End: 2024-01-22
Payer: COMMERCIAL

## 2024-01-22 VITALS
RESPIRATION RATE: 20 BRPM | TEMPERATURE: 98 F | OXYGEN SATURATION: 98 % | SYSTOLIC BLOOD PRESSURE: 119 MMHG | HEIGHT: 67 IN | HEART RATE: 86 BPM | DIASTOLIC BLOOD PRESSURE: 60 MMHG | BODY MASS INDEX: 25.33 KG/M2 | WEIGHT: 161.4 LBS

## 2024-01-22 DIAGNOSIS — M54.16 LUMBAR RADICULOPATHY: Primary | ICD-10-CM

## 2024-01-22 DIAGNOSIS — M54.16 LUMBAR RADICULOPATHY: ICD-10-CM

## 2024-01-22 DIAGNOSIS — M54.50 CHRONIC LOW BACK PAIN, UNSPECIFIED BACK PAIN LATERALITY, UNSPECIFIED WHETHER SCIATICA PRESENT: ICD-10-CM

## 2024-01-22 DIAGNOSIS — G89.29 CHRONIC LOW BACK PAIN, UNSPECIFIED BACK PAIN LATERALITY, UNSPECIFIED WHETHER SCIATICA PRESENT: ICD-10-CM

## 2024-01-22 DIAGNOSIS — M51.36 DDD (DEGENERATIVE DISC DISEASE), LUMBAR: Primary | ICD-10-CM

## 2024-01-22 PROCEDURE — 99214 OFFICE O/P EST MOD 30 MIN: CPT

## 2024-01-22 NOTE — PROGRESS NOTES
CHIEF COMPLAINT  Back pain    Subjective   Ro Perez is a 64 y.o. female  who presents for follow-up.  She has a history of low back pain. She reports that her pain has slightly worsened since her last office visit. She would like to discuss repeating a lumbar epidural.     Today pain is 5/10VAS in severity. Pain is located in her low back (left side worse than right) and radiates into left anterior thigh and down lateral leg into ankle. Describes this pain as a nearly continuous aching, burning, and shooting. Pain is worsened by walking long distances, prolonged sitting or standing, rising from sitting to standing position, and bending/twisting. Pain improve with rest/reposition, heat, and OTC Aleve PRN. She has completed PT since her last office visit with mixed results.      Patient works at [employer].     Procedures:  11/1/23 - L3-L4 LESI - 100% relief x 2.5 months    Back Pain  This is a chronic problem. The current episode started more than 1 year ago. The problem occurs constantly. The problem has been gradually worsening since onset. The quality of the pain is described as shooting, aching and burning. The pain does not radiate (left anterior thigh and left lateral leg into ankle). The pain is at a severity of 5/10 (severity in pain varies based on activity level). The pain is moderate. The pain is The same all the time. The symptoms are aggravated by position, sitting and standing (walking long distances,). Associated symptoms include numbness (left leg). Pertinent negatives include no abdominal pain, chest pain, dysuria, fever, headaches or weakness. She has tried chiropractic manipulation, heat and NSAIDs (PT,) for the symptoms. The treatment provided mild relief.     PEG Assessment   What number best describes your pain on average in the past week?5  What number best describes how, during the past week, pain has interfered with your enjoyment of life?0  What number best describes how, during the past week,  pain has interfered with your general activity?  5    Review of Pertinent Medical Data ---  MRI OF THE LUMBAR SPINE WITHOUT CONTRAST     CLINICAL HISTORY:Chronic low back pain with bilateral sciatica.     TECHNIQUE: MRI of the lumbar spine was obtained with sagittal T1,  proton-density, and T2-weighted images. Additionally, there are coronal  T2 as well as axial T1 and T2-weighted images through the lumbar spine.     COMPARISON:No previous MRIs examinations of the lumbar spine are  available for comparison.     FINDINGS:     There is mild-to-moderate dextroconvex scoliotic curvature of the lumbar  spine with its apex centered at L2-3. Advanced degenerative disc changes  are seen along the inner margins of the scoliotic curvature along the  left aspect of the L1-2 and L2-3 disc spaces.     The conus medullaris terminates at the level of the L1-2 disc space and  has normal signal intensity. The visualized distal thoracic spinal cord  is unremarkable in appearance.     At L1-2, there is degenerative retrolisthesis of L1 on L2 by  approximately 2 mm. There is no significant canal or foraminal stenosis  at L1-2.     At L2-3, there is a disc osteophyte complex eccentric to left which  minimally indents the ventral subarachnoid space. There is no  significant degree of foraminal narrowing. Degenerative disc changes are  prominently seen along the aspect of the L2-3 disc space along with  adjacent degenerative endplate marrow changes.     At L3-4, there is bulging disc material eccentric to left which mildly  narrows the left neural foramen in conjunction with mild facet  arthropathy.     At L4-5 and L5-S1, there is mild facet hypertrophic change but no  significant canal or foraminal narrowing is noted.     IMPRESSION:     There is mild-to-moderate dextroconvex scoliotic curvature of the lumbar  spine with its apex centered at L2-3. Advanced degenerative disc changes  are seen along the left aspect of the L1-2 and L2-3  "disc spaces.  Degenerative endplate marrow changes are seen at the L2-3 level.     Only a minimal degree of canal narrowing is seen at L2-3 secondary to a  disc osteophyte complex eccentric to the left. A mild degree of left  foraminal narrowing is seen at L3-4 secondary to bulging disc material  and facet hypertrophic change.     The remaining degenerative phenomena within the lumbar spine are as  discussed in detail above.     This report was finalized on 7/5/2023 9:53 AM by Dr. Kali Pimentel M.D.     The following portions of the patient's history were reviewed and updated as appropriate: allergies, current medications, past family history, past medical history, past social history, past surgical history, and problem list.    Review of Systems   Constitutional:  Negative for activity change, fatigue and fever.   HENT:  Negative for congestion.    Respiratory:  Positive for cough. Negative for chest tightness.    Cardiovascular:  Negative for chest pain.   Gastrointestinal:  Negative for abdominal pain, constipation and diarrhea.   Genitourinary:  Negative for difficulty urinating and dysuria.   Musculoskeletal:  Positive for back pain.   Neurological:  Positive for numbness (left leg). Negative for dizziness, weakness, light-headedness and headaches.   Psychiatric/Behavioral:  Negative for agitation, sleep disturbance and suicidal ideas. The patient is not nervous/anxious.      I have reviewed and confirmed the accuracy of the ROS as documented by the MA/LPN/RN VINH Phillips    Vitals:    01/22/24 1240   BP: 119/60   BP Location: Right arm   Patient Position: Sitting   Cuff Size: Adult   Pulse: 86   Resp: 20   Temp: 98 °F (36.7 °C)   TempSrc: Temporal   SpO2: 98%   Weight: 73.2 kg (161 lb 6.4 oz)   Height: 170.2 cm (67\")   PainSc:   5     Objective   Physical Exam  Constitutional:       Appearance: Normal appearance.   HENT:      Head: Normocephalic.   Cardiovascular:      Rate and Rhythm: Normal rate " and regular rhythm.   Pulmonary:      Effort: Pulmonary effort is normal.      Breath sounds: Normal breath sounds.   Musculoskeletal:      Cervical back: Normal range of motion.      Lumbar back: Tenderness (left side > right side) present. Positive left straight leg raise test. Negative right straight leg raise test.      Comments: + lumbar facet loading/tenderness   Skin:     General: Skin is warm and dry.      Capillary Refill: Capillary refill takes less than 2 seconds.   Neurological:      General: No focal deficit present.      Mental Status: She is alert and oriented to person, place, and time.   Psychiatric:         Mood and Affect: Mood normal.         Behavior: Behavior normal.         Thought Content: Thought content normal.         Cognition and Memory: Cognition normal.       Assessment & Plan   Diagnoses and all orders for this visit:    1. DDD (degenerative disc disease), lumbar (Primary)    2. Lumbar radiculopathy      Ro Perez reports a pain score of 5.  Given her pain assessment as noted, treatment options were discussed and the following options were decided upon as a follow-up plan to address the patient's pain: continuation of current treatment plan for pain. Repeat LESI.     -- Repeat L3-L4 LESI  ---  Indications for epidural injection:  Plan is to proceed with epidural at the appropriate level.  If the patient receives significant pain reduction and improvement in function and the plan will be to repeat the epidural when the pain worsens.  If a second epidural provides at least 6 weeks of sustained improvement that includes both pain reduction and improvement in function then an epidural injection could be repeated once again at the same level.  This is a mutual decision between the clinician and the patient that includes discussions including risks and benefits in detail as well as alternative therapies.  Patient's questions were answered to their satisfaction and to their  understanding.  ---  --- Follow-up for procedure      MANJULA REPORT  As the clinician, I personally reviewed the MANJULA from 1/22/24 while the patient was in the office today.    Dictated utilizing Dragon dictation.

## 2024-01-23 ENCOUNTER — TRANSCRIBE ORDERS (OUTPATIENT)
Dept: SURGERY | Facility: SURGERY CENTER | Age: 64
End: 2024-01-23
Payer: COMMERCIAL

## 2024-01-23 DIAGNOSIS — Z41.9 SURGERY, ELECTIVE: Primary | ICD-10-CM

## 2024-01-23 PROBLEM — G89.29 CHRONIC LOW BACK PAIN: Status: ACTIVE | Noted: 2024-01-22

## 2024-01-23 PROBLEM — M54.50 CHRONIC LOW BACK PAIN: Status: ACTIVE | Noted: 2024-01-22

## 2024-02-06 RX ORDER — ALBUTEROL SULFATE 90 UG/1
2 AEROSOL, METERED RESPIRATORY (INHALATION) EVERY 4 HOURS PRN
Qty: 8.5 G | Refills: 2 | Status: SHIPPED | OUTPATIENT
Start: 2024-02-06

## 2024-02-06 NOTE — TELEPHONE ENCOUNTER
Rx Refill Note  Requested Prescriptions     Pending Prescriptions Disp Refills    albuterol sulfate  (90 Base) MCG/ACT inhaler [Pharmacy Med Name: ALBUTEROL HFA (PROAIR) INHALER]  2     Sig: INHALE 2 PUFFS BY MOUTH EVERY 4 HOURS AS NEEDED FOR WHEEZING      Last office visit with prescribing clinician: 7/19/2023   Last telemedicine visit with prescribing clinician: Visit date not found   Next office visit with prescribing clinician: Visit date not found                         Would you like a call back once the refill request has been completed: [] Yes [] No    If the office needs to give you a call back, can they leave a voicemail: [] Yes [] No    Yokasta Davis MA  02/06/24, 08:38 EST

## 2024-02-16 ENCOUNTER — TELEPHONE (OUTPATIENT)
Dept: PAIN MEDICINE | Facility: CLINIC | Age: 64
End: 2024-02-16
Payer: COMMERCIAL

## 2024-02-19 NOTE — SIGNIFICANT NOTE
Education provided the Patient on the following:    - Nothing to Eat or Drink after MN the night before the procedure    - Avoid red/purple fluids while completing their bowel prep as ordered by physician  -Contact Gastrointerologist office for any questions about specific details regarding colon prep    -You will need to have someone drive you home after your colonoscopy and remain with you for 24 hours after the procedure  - The date of your Surgery, you may have one visitor at bedside or within 10-15 minutes of [facility]  -Please wear warm socks when you arrive for your colonoscopy  -Remove all jewelry and leave any valuables before arriving the day of your procedure (all will have to be removed before leaving preop)  -You will need to arrive at 0730 on 2-20-24 for your colonoscopy    -Feel free to contact us at: [phone] with any additional questions/concerns

## 2024-02-20 ENCOUNTER — HOSPITAL ENCOUNTER (OUTPATIENT)
Dept: GENERAL RADIOLOGY | Facility: SURGERY CENTER | Age: 64
Setting detail: HOSPITAL OUTPATIENT SURGERY
End: 2024-02-20
Payer: COMMERCIAL

## 2024-02-20 ENCOUNTER — HOSPITAL ENCOUNTER (OUTPATIENT)
Facility: SURGERY CENTER | Age: 64
Setting detail: HOSPITAL OUTPATIENT SURGERY
Discharge: HOME | End: 2024-02-20
Attending: ANESTHESIOLOGY | Admitting: ANESTHESIOLOGY
Payer: COMMERCIAL

## 2024-02-20 VITALS
HEART RATE: 79 BPM | TEMPERATURE: 97.6 F | BODY MASS INDEX: 25.53 KG/M2 | SYSTOLIC BLOOD PRESSURE: 145 MMHG | DIASTOLIC BLOOD PRESSURE: 81 MMHG | RESPIRATION RATE: 16 BRPM | OXYGEN SATURATION: 95 % | WEIGHT: 163 LBS

## 2024-02-20 DIAGNOSIS — M54.16 LUMBAR RADICULOPATHY: ICD-10-CM

## 2024-02-20 DIAGNOSIS — G89.29 CHRONIC LOW BACK PAIN, UNSPECIFIED BACK PAIN LATERALITY, UNSPECIFIED WHETHER SCIATICA PRESENT: ICD-10-CM

## 2024-02-20 DIAGNOSIS — M54.50 CHRONIC LOW BACK PAIN, UNSPECIFIED BACK PAIN LATERALITY, UNSPECIFIED WHETHER SCIATICA PRESENT: ICD-10-CM

## 2024-02-20 DIAGNOSIS — Z41.9 SURGERY, ELECTIVE: ICD-10-CM

## 2024-02-20 PROCEDURE — 76000 FLUOROSCOPY <1 HR PHYS/QHP: CPT

## 2024-02-20 PROCEDURE — 25510000001 IOPAMIDOL 61 % SOLUTION 30 ML VIAL: Performed by: ANESTHESIOLOGY

## 2024-02-20 PROCEDURE — 77002 NEEDLE LOCALIZATION BY XRAY: CPT

## 2024-02-20 PROCEDURE — 62323 NJX INTERLAMINAR LMBR/SAC: CPT | Performed by: ANESTHESIOLOGY

## 2024-02-20 PROCEDURE — 25010000002 BUPIVACAINE (PF) 0.5 % SOLUTION 10 ML VIAL: Performed by: ANESTHESIOLOGY

## 2024-02-20 PROCEDURE — 25010000002 METHYLPREDNISOLONE PER 80 MG: Performed by: ANESTHESIOLOGY

## 2024-02-20 NOTE — OP NOTE
Lumbar Epidural Steroid Injection  [facility]    PREOPERATIVE DIAGNOSIS:   Spondylolisthesis and left Lumbar Radiculopathy  POSTOPERATIVE DIAGNOSIS:  Same as preop diagnosis    PROCEDURE:   Lumbar Epidural Steroid Injection, Therapeutic Translaminar Injection, with epidurogram, at  L3/L4 level    PRE-PROCEDURE DISCUSSION WITH PATIENT:    Risks and complications were discussed with the patient prior to starting the procedure and informed consent was obtained.  We discussed various topics including but not limited to bleeding, infection, injury, paralysis, nerve injury, dural puncture, coma, death, worsening of clinical picture, lack of pain relief, and postprocedural soreness.    SURGEON:  Raul Moreira MD    REASON FOR PROCEDURE:    Previous clinically significant therapeutic effect is noted., Degenerative changes are noted in the area., and Radiating pattern of pain is likely consistent with degenerative changes in the area.    SEDATION:  Patient declined administration of moderate sedation    ANESTHETIC:  Marcaine 0.25%  STEROID:   Methylprednisolone (DEPO MEDROL) 80mg/ml    DESCRIPTON OF PROCEDURE:    After obtaining informed consent, I.V. was not started in the preop area.   The patient was taken to the operating room and placed in the prone position.  EKG, blood pressure, and pulse oximeter were monitored throughout, and sedation was provided as needed by the RN under my guidance. All pressure points were well padded.  The lumbar spine area was prepped with Chloraprep and draped in a sterile fashion.  Under fluoroscopic guidance, the above mentioned interlaminar space was identified. Skin and subcutaneous tissues were anesthetized with 1% lidocaine in the middle of the space. A Tuohy needle was introduced through the skin and advanced to this interlaminar space and into the epidural space under fluoroscopic guidance and verified with loss-of-resistance technique to air.  After confirming  the position of the needle with the fluoroscope with all the views, and after aspiration was confirmed negative for blood and CSF, 1.5 mL of Omnipaque was injected.  After seeing appropriate epidurogram with lateral and PA views, a total of 3 cc solution was injected, consisting of 1cc of local anesthetic as above, with normal saline and injectable steroid as above.     ESTIMATED BLOOD LOSS:  <5 mL  SPECIMENS:  None    COMPLICATIONS:     No complications were noted., There was no indication of vascular uptake on live injection of contrast dye., and There was no indication of intrathecal uptake on live injection of contrast dye.    TOLERANCE & DISCHARGE CONDITION:    The patient tolerated the procedure well.  The patient was transported to the recovery area without difficulties.  The patient was discharged to home under the care of family in stable and satisfactory condition.    PLAN OF CARE:  The patient was given our standard instruction sheet.  The patient will Return to clinic 3-4 wks  The patient will resume all medications as per the medication reconciliation sheet.

## 2024-02-20 NOTE — DISCHARGE INSTRUCTIONS
[facility] - Post-procedure Instructions          --  While there are no absolute restrictions, it is recommended that you do not perform strenuous activity today. In the morning, you may resume your level of activity as before your block.    --  If you have a band-aid at your injection site, please remove it later today. Observe the area for any redness, swelling, pus-like drainage, or a temperature over 101°. If any of these symptoms occur, please call your doctor at [phone]. If after office hours, leave a message and the on-call provider will return your call.    --  Ice may be applied to your injection site. It is recommended you avoid direct heat (heating pad; hot tub) for 1-2 days.    --  Call [facility] at [phone] if you experience persistent headache, persistent bleeding from the injection site, or severe pain not relieved by heat or oral medication.    --  Do not make important decisions today.    --  Due to the effects of the block and/or the I.V. Sedation, DO NOT drive or operate hazardous machinery for 12 hours.  Local anesthetics may cause numbness after procedure and precautions must be taken with regards to operating equipment as well as with walking, even if ambulating with assistance of another person or with an assistive device.    --  Do not drink alcohol for 12 hours.    -- You may return to work tomorrow, or as directed by your referring doctor.    --  Occasionally you may notice a slight increase in your pain after the procedure. This should start to improve within the next 24-48 hours. Radiofrequency ablation procedure pain may last 3-4 weeks.    --  It may take as long as 3-4 days before you notice a gradual improvement in your pain and/or other symptoms.    -- You may continue to take your prescribed pain medication as needed.    --  Some normal possible side effects of steroid use could include fluid retention, increased blood sugar, dull headache,  increased sweating, increased appetite, mood swings and flushing.    --  Diabetics are recommended to watch their blood glucose level closely for 24-48 hours after the injection.    --  Must stay in PACU for 20 min upon arrival and prove no leg weakness before being discharged.    --  IN THE EVENT OF A LIFE THREATENING EMERGENCY, (CHEST PAIN, BREATHING DIFFICULTIES, PARALYSIS…) YOU SHOULD GO TO YOUR NEAREST EMERGENCY ROOM.    --  You should be contacted by our office within 2-3 days to schedule follow up or next appointment date.  If not contacted within 7 days, please call the office at [phone]

## 2024-03-02 NOTE — ANESTHESIA POSTPROCEDURE EVALUATION
"Patient: Ro Perez    Procedure Summary     Date:  07/20/20 Room / Location:  [facility] ENDOSCOPY 7 / [facility] ENDOSCOPY    Anesthesia Start:  1445 Anesthesia Stop:  1534    Procedure:  COLONOSCOPY to cecum and t.i. with polypectomies (N/A ) Diagnosis:       Family history of polyps in the colon      (Family history of polyps in the colon [Z83.71])    Surgeon:  Ashlie Ruggiero MD Provider:  Casey Trujillo MD    Anesthesia Type:  MAC ASA Status:  3          Anesthesia Type: MAC    Vitals  Vitals Value Taken Time   /84 7/20/2020  3:43 PM   Temp     Pulse 77 7/20/2020  3:43 PM   Resp 16 7/20/2020  3:43 PM   SpO2 97 % 7/20/2020  3:43 PM           Post Anesthesia Care and Evaluation    Patient location during evaluation: bedside  Patient participation: complete - patient participated  Level of consciousness: awake and alert  Pain management: adequate  Airway patency: patent  Anesthetic complications: No anesthetic complications    Cardiovascular status: acceptable  Respiratory status: acceptable  Hydration status: acceptable    Comments: /84 (BP Location: Left arm, Patient Position: Sitting)   Pulse 77   Temp 36.8 °C (98.2 °F) (Oral)   Resp 16   Ht 167.6 cm (66\")   Wt 94.8 kg (209 lb)   SpO2 97%   BMI 33.73 kg/m²       "
Abdominal Pain, N/V/D

## 2024-03-11 ENCOUNTER — OFFICE VISIT (OUTPATIENT)
Dept: PAIN MEDICINE | Facility: CLINIC | Age: 64
End: 2024-03-11
Payer: COMMERCIAL

## 2024-03-11 VITALS
HEART RATE: 87 BPM | WEIGHT: 165.6 LBS | TEMPERATURE: 96.8 F | RESPIRATION RATE: 18 BRPM | SYSTOLIC BLOOD PRESSURE: 107 MMHG | BODY MASS INDEX: 25.99 KG/M2 | OXYGEN SATURATION: 94 % | DIASTOLIC BLOOD PRESSURE: 71 MMHG | HEIGHT: 67 IN

## 2024-03-11 DIAGNOSIS — M51.36 DDD (DEGENERATIVE DISC DISEASE), LUMBAR: Primary | ICD-10-CM

## 2024-03-11 DIAGNOSIS — M54.16 LUMBAR RADICULOPATHY: ICD-10-CM

## 2024-03-11 PROCEDURE — 99212 OFFICE O/P EST SF 10 MIN: CPT

## 2024-03-11 NOTE — PROGRESS NOTES
CHIEF COMPLAINT  Back pain    Subjective   Ro Perez is a 64 y.o. female  who presents to the office for follow-up of procedure.  She completed a L3-L4 LESIon  2/20/24 performed by Dr. Moreira for management of back pain. Patient reports 100% relief from the procedure.She has been able to be more active and has improved posture and ROM since the procedure.     Today pain is 0/10VAS in severity (severity in pain varies based on activity level). Pain is located in her low back (left side worse than right) and radiates into left anterior thigh and down lateral leg into ankle. Describes this pain as a nearly continuous aching, burning, and shooting. Pain is worsened by walking long distances, prolonged sitting or standing, rising from sitting to standing position, and bending/twisting. Pain improve with rest/reposition, heat, and OTC Aleve PRN. She has completed PT since her last office visit with mixed results.      Patient works at [employer].     Procedures:  2/20/24 - L3-L4 LESI - 100% ongoing relief  11/1/23 - L3-L4 LESI - 100% relief x 2.5 months    Back Pain  This is a chronic problem. The current episode started more than 1 year ago. The problem occurs constantly. The problem has been gradually worsening since onset. The quality of the pain is described as shooting, aching and burning. The pain does not radiate (left anterior thigh and left lateral leg into ankle). The pain is at a severity of 0/10 (severity in pain varies based on activity level). The pain is moderate. The pain is The same all the time. The symptoms are aggravated by position, sitting and standing (walking long distances,). Associated symptoms include numbness (left leg). Pertinent negatives include no abdominal pain, chest pain, dysuria, fever, headaches or weakness. She has tried chiropractic manipulation, heat and NSAIDs (PT,) for the symptoms. The treatment provided mild relief.      PEG Assessment   What number best describes your pain on  "average in the past week?0  What number best describes how, during the past week, pain has interfered with your enjoyment of life?0  What number best describes how, during the past week, pain has interfered with your general activity?  0    Review of Pertinent Medical Data ---        The following portions of the patient's history were reviewed and updated as appropriate: allergies, current medications, past family history, past medical history, past social history, past surgical history, and problem list.    Review of Systems   Constitutional:  Negative for activity change, fatigue and fever.   Cardiovascular:  Negative for chest pain.   Gastrointestinal:  Negative for abdominal pain, constipation and diarrhea.   Genitourinary:  Negative for difficulty urinating and dysuria.   Musculoskeletal:  Positive for back pain.   Neurological:  Positive for numbness (left leg). Negative for dizziness, weakness, light-headedness and headaches.   Psychiatric/Behavioral:  Negative for agitation, sleep disturbance and suicidal ideas. The patient is not nervous/anxious.      I have reviewed and confirmed the accuracy of the ROS as documented by the MA/LPN/RN VINH Phillips    Vitals:    03/11/24 1112   BP: 107/71   BP Location: Left arm   Patient Position: Sitting   Cuff Size: Large Adult   Pulse: 87   Resp: 18   Temp: 96.8 °F (36 °C)   SpO2: 94%   Weight: 75.1 kg (165 lb 9.6 oz)   Height: 170.2 cm (67\")   PainSc: 0-No pain   PainLoc: Back     Objective   Physical Exam  Constitutional:       Appearance: Normal appearance.   HENT:      Head: Normocephalic.   Cardiovascular:      Rate and Rhythm: Normal rate and regular rhythm.   Pulmonary:      Effort: Pulmonary effort is normal.      Breath sounds: Normal breath sounds.   Musculoskeletal:      Cervical back: Normal range of motion.      Lumbar back: No tenderness. Negative right straight leg raise test and negative left straight leg raise test.   Skin:     General: Skin " is warm and dry.      Capillary Refill: Capillary refill takes less than 2 seconds.   Neurological:      General: No focal deficit present.      Mental Status: She is alert and oriented to person, place, and time.   Psychiatric:         Mood and Affect: Mood normal.         Behavior: Behavior normal.         Thought Content: Thought content normal.         Cognition and Memory: Cognition normal.       Assessment & Plan   Diagnoses and all orders for this visit:    1. DDD (degenerative disc disease), lumbar (Primary)    2. Lumbar radiculopathy      Ro Perez reports a pain score of 0.  Given her pain assessment as noted, treatment options were discussed and the following options were decided upon as a follow-up plan to address the patient's pain: continuation of current treatment plan for pain.    --- Follow-up as needed for increased pain and/or to repeat DEANDRE FAIR REPORT  As the clinician, I personally reviewed the MANJULA from 3/11/24 while the patient was in the office today.    Dictated utilizing Dragon dictation.

## 2024-04-11 RX ORDER — FENOFIBRATE 145 MG/1
145 TABLET, COATED ORAL DAILY
Qty: 90 TABLET | Refills: 2 | Status: SHIPPED | OUTPATIENT
Start: 2024-04-11

## 2024-04-11 NOTE — TELEPHONE ENCOUNTER
Rx Refill Note  Requested Prescriptions     Pending Prescriptions Disp Refills    fenofibrate (TRICOR) 145 MG tablet [Pharmacy Med Name: FENOFIBRATE 145 MG TABLET] 90 tablet 2     Sig: TAKE 1 TABLET BY MOUTH EVERY DAY      Last office visit with prescribing clinician: 7/19/2023   Last telemedicine visit with prescribing clinician: Visit date not found   Next office visit with prescribing clinician: Visit date not found                         Would you like a call back once the refill request has been completed: [] Yes [] No    If the office needs to give you a call back, can they leave a voicemail: [] Yes [] No    Yokasta Davis MA  04/11/24, 08:41 EDT

## 2024-04-29 ENCOUNTER — PATIENT MESSAGE (OUTPATIENT)
Dept: FAMILY MEDICINE CLINIC | Facility: CLINIC | Age: 64
End: 2024-04-29
Payer: COMMERCIAL

## 2024-04-29 ENCOUNTER — OFFICE VISIT (OUTPATIENT)
Dept: PAIN MEDICINE | Facility: CLINIC | Age: 64
End: 2024-04-29
Payer: COMMERCIAL

## 2024-04-29 ENCOUNTER — PREP FOR SURGERY (OUTPATIENT)
Dept: SURGERY | Facility: SURGERY CENTER | Age: 64
End: 2024-04-29
Payer: COMMERCIAL

## 2024-04-29 VITALS
OXYGEN SATURATION: 96 % | BODY MASS INDEX: 27.09 KG/M2 | HEIGHT: 67 IN | TEMPERATURE: 96.6 F | HEART RATE: 79 BPM | DIASTOLIC BLOOD PRESSURE: 78 MMHG | SYSTOLIC BLOOD PRESSURE: 122 MMHG | WEIGHT: 172.6 LBS

## 2024-04-29 DIAGNOSIS — E55.9 VITAMIN D DEFICIENCY: ICD-10-CM

## 2024-04-29 DIAGNOSIS — G89.29 CHRONIC LOW BACK PAIN, UNSPECIFIED BACK PAIN LATERALITY, UNSPECIFIED WHETHER SCIATICA PRESENT: ICD-10-CM

## 2024-04-29 DIAGNOSIS — M54.50 CHRONIC LOW BACK PAIN, UNSPECIFIED BACK PAIN LATERALITY, UNSPECIFIED WHETHER SCIATICA PRESENT: ICD-10-CM

## 2024-04-29 DIAGNOSIS — M51.36 DDD (DEGENERATIVE DISC DISEASE), LUMBAR: Primary | ICD-10-CM

## 2024-04-29 DIAGNOSIS — Z79.899 MEDICATION MANAGEMENT: ICD-10-CM

## 2024-04-29 DIAGNOSIS — M54.16 LUMBAR RADICULOPATHY: Primary | ICD-10-CM

## 2024-04-29 DIAGNOSIS — M54.16 LUMBAR RADICULOPATHY: ICD-10-CM

## 2024-04-29 DIAGNOSIS — Z00.00 HEALTH MAINTENANCE EXAMINATION: ICD-10-CM

## 2024-04-29 DIAGNOSIS — E78.2 MIXED HYPERLIPIDEMIA: Primary | ICD-10-CM

## 2024-04-29 DIAGNOSIS — Z13.29 SCREENING FOR THYROID DISORDER: ICD-10-CM

## 2024-04-29 DIAGNOSIS — Z13.1 SCREENING FOR DIABETES MELLITUS: ICD-10-CM

## 2024-04-29 PROCEDURE — 99214 OFFICE O/P EST MOD 30 MIN: CPT

## 2024-04-29 NOTE — PROGRESS NOTES
CHIEF COMPLAINT  Back pain    Subjective   Ro Perez is a 64 y.o. female  who presents to the office for follow up. She has a history of low back pain. She reports that her pain has slightly worsened since her last office visit. She would like to discuss repeating a lumbar epidural.     Today pain is 3/10VAS in severity (severity in pain varies based on activity level). Pain is located in her low back (left side worse than right) and radiates into left hip/anterior thigh and down lateral leg into ankle. Describes this pain as a nearly continuous aching, burning, and shooting. Pain is worsened by walking long distances, prolonged sitting or standing, rising from sitting to standing position, and bending/twisting. Pain improve with rest/reposition, heat, and OTC Aleve/Ibuprofen PRN. She has completed PT since her last office visit with mixed results.      Patient works at [employer].     Procedures:  2/20/24 - L3-L4 LESI - 100% x 2.5 months  11/1/23 - L3-L4 LESI - 100% relief x 2.5 months    Back Pain  This is a chronic problem. The current episode started more than 1 year ago. The problem occurs constantly. The problem has been gradually worsening since onset. The quality of the pain is described as shooting, aching and burning. The pain does not radiate (left anterior thigh and left lateral leg into ankle). The pain is at a severity of 0/10 (severity in pain varies based on activity level). The pain is moderate. The pain is The same all the time. The symptoms are aggravated by position, sitting and standing (walking long distances,). Associated symptoms include numbness (left leg). Pertinent negatives include no abdominal pain, chest pain, dysuria, fever, headaches or weakness. She has tried chiropractic manipulation, heat and NSAIDs (PT,) for the symptoms. The treatment provided mild relief.      PEG Assessment   What number best describes your pain on average in the past week?5  What number best describes how, during  the past week, pain has interfered with your enjoyment of life?0  What number best describes how, during the past week, pain has interfered with your general activity?  0    Review of Pertinent Medical Data ---  MRI OF THE LUMBAR SPINE WITHOUT CONTRAST     CLINICAL HISTORY:Chronic low back pain with bilateral sciatica.     TECHNIQUE: MRI of the lumbar spine was obtained with sagittal T1,  proton-density, and T2-weighted images. Additionally, there are coronal  T2 as well as axial T1 and T2-weighted images through the lumbar spine.     COMPARISON:No previous MRIs examinations of the lumbar spine are  available for comparison.     FINDINGS:     There is mild-to-moderate dextroconvex scoliotic curvature of the lumbar  spine with its apex centered at L2-3. Advanced degenerative disc changes  are seen along the inner margins of the scoliotic curvature along the  left aspect of the L1-2 and L2-3 disc spaces.     The conus medullaris terminates at the level of the L1-2 disc space and  has normal signal intensity. The visualized distal thoracic spinal cord  is unremarkable in appearance.     At L1-2, there is degenerative retrolisthesis of L1 on L2 by  approximately 2 mm. There is no significant canal or foraminal stenosis  at L1-2.     At L2-3, there is a disc osteophyte complex eccentric to left which  minimally indents the ventral subarachnoid space. There is no  significant degree of foraminal narrowing. Degenerative disc changes are  prominently seen along the aspect of the L2-3 disc space along with  adjacent degenerative endplate marrow changes.     At L3-4, there is bulging disc material eccentric to left which mildly  narrows the left neural foramen in conjunction with mild facet  arthropathy.     At L4-5 and L5-S1, there is mild facet hypertrophic change but no  significant canal or foraminal narrowing is noted.     IMPRESSION:     There is mild-to-moderate dextroconvex scoliotic curvature of the lumbar  spine  "with its apex centered at L2-3. Advanced degenerative disc changes  are seen along the left aspect of the L1-2 and L2-3 disc spaces.  Degenerative endplate marrow changes are seen at the L2-3 level.     Only a minimal degree of canal narrowing is seen at L2-3 secondary to a  disc osteophyte complex eccentric to the left. A mild degree of left  foraminal narrowing is seen at L3-4 secondary to bulging disc material  and facet hypertrophic change.     The remaining degenerative phenomena within the lumbar spine are as  discussed in detail above.     This report was finalized on 7/5/2023 9:53 AM by Dr. Kali Pimentel M.D.       The following portions of the patient's history were reviewed and updated as appropriate: allergies, current medications, past family history, past medical history, past social history, past surgical history, and problem list.    Review of Systems   Constitutional:  Negative for chills and fever.   Respiratory:  Negative for cough and shortness of breath.    Cardiovascular:  Negative for chest pain.   Gastrointestinal:  Negative for abdominal pain, constipation and diarrhea.   Genitourinary:  Negative for difficulty urinating and dysuria.   Musculoskeletal:  Positive for back pain.   Neurological:  Positive for numbness (left leg). Negative for dizziness, weakness, light-headedness and headaches.   Psychiatric/Behavioral:  Negative for agitation.      I have reviewed and confirmed the accuracy of the ROS as documented by the MA/ALMAN/RN VINH Phillips     Vitals:    04/29/24 1328   BP: 122/78   BP Location: Left arm   Patient Position: Sitting   Pulse: 79   Temp: 96.6 °F (35.9 °C)   TempSrc: Temporal   SpO2: 96%   Weight: 78.3 kg (172 lb 9.6 oz)   Height: 170.2 cm (67\")   PainSc:   3   PainLoc: Back     Objective   Physical Exam  Constitutional:       Appearance: Normal appearance.   HENT:      Head: Normocephalic.   Cardiovascular:      Rate and Rhythm: Normal rate and regular rhythm. "   Pulmonary:      Effort: Pulmonary effort is normal.      Breath sounds: Normal breath sounds.   Musculoskeletal:      Cervical back: Normal range of motion.      Lumbar back: Tenderness present. Positive left straight leg raise test. Negative right straight leg raise test.      Left hip: Tenderness present.      Comments: + lumbar facet loading/tenderness   Skin:     General: Skin is warm and dry.      Capillary Refill: Capillary refill takes less than 2 seconds.   Neurological:      General: No focal deficit present.      Mental Status: She is alert and oriented to person, place, and time.   Psychiatric:         Mood and Affect: Mood normal.         Behavior: Behavior normal.         Thought Content: Thought content normal.         Cognition and Memory: Cognition normal.       Assessment & Plan   Diagnoses and all orders for this visit:    1. DDD (degenerative disc disease), lumbar (Primary)    2. Lumbar radiculopathy      Ro Perez reports a pain score of 3.  Given her pain assessment as noted, treatment options were discussed and the following options were decided upon as a follow-up plan to address the patient's pain: continuation of current treatment plan for pain and steroid injections.    --- L3-L4 LESI - to be performed on/after 5/20/24  ---  Indications for epidural injection:  Plan is to proceed with epidural at the appropriate level.  If the patient receives significant pain reduction and improvement in function and the plan will be to repeat the epidural when the pain worsens.  If a second epidural provides at least 6 weeks of sustained improvement that includes both pain reduction and improvement in function then an epidural injection could be repeated once again at the same level.  This is a mutual decision between the clinician and the patient that includes discussions including risks and benefits in detail as well as alternative therapies.  Patient's questions were answered to their satisfaction  and to their understanding.  ---   --- Follow-up for procedure     MANJULA REPORT  As the clinician, I personally reviewed the MANJULA from 4/29/24 while the patient was in the office today.    Dictated utilizing Dragon dictation.

## 2024-04-30 ENCOUNTER — TRANSCRIBE ORDERS (OUTPATIENT)
Dept: SURGERY | Facility: SURGERY CENTER | Age: 64
End: 2024-04-30
Payer: COMMERCIAL

## 2024-04-30 DIAGNOSIS — Z41.9 SURGERY, ELECTIVE: Primary | ICD-10-CM

## 2024-04-30 NOTE — TELEPHONE ENCOUNTER
From: Ro Perez  To: James Epley  Sent: 4/29/2024 1:28 PM EDT  Subject: Blood work    Hi Sebastian,     Could you send in a request for me to get blood work before my July 1st appointment please and thank you.

## 2024-05-14 RX ORDER — MIRTAZAPINE 15 MG/1
15 TABLET, FILM COATED ORAL
Qty: 90 TABLET | Refills: 1 | Status: SHIPPED | OUTPATIENT
Start: 2024-05-14

## 2024-05-14 RX ORDER — DULOXETIN HYDROCHLORIDE 60 MG/1
60 CAPSULE, DELAYED RELEASE ORAL 2 TIMES DAILY
Qty: 180 CAPSULE | Refills: 1 | Status: SHIPPED | OUTPATIENT
Start: 2024-05-14

## 2024-05-14 NOTE — TELEPHONE ENCOUNTER
Rx Refill Note  Requested Prescriptions     Pending Prescriptions Disp Refills    DULoxetine (CYMBALTA) 60 MG capsule [Pharmacy Med Name: DULOXETINE HCL DR 60 MG CAP] 180 capsule 1     Sig: TAKE 1 CAPSULE BY MOUTH TWICE A DAY    mirtazapine (REMERON) 15 MG tablet [Pharmacy Med Name: MIRTAZAPINE 15 MG TABLET] 90 tablet 1     Sig: TAKE 1 TABLET BY MOUTH EVERYDAY AT BEDTIME      Last office visit with prescribing clinician: 7/19/2023   Last telemedicine visit with prescribing clinician: Visit date not found   Next office visit with prescribing clinician: 7/1/2024                         Would you like a call back once the refill request has been completed: [] Yes [] No    If the office needs to give you a call back, can they leave a voicemail: [] Yes [] No    Raysa Cool  05/14/24, 10:22 EDT

## 2024-06-04 DIAGNOSIS — Z13.1 SCREENING FOR DIABETES MELLITUS: ICD-10-CM

## 2024-06-04 DIAGNOSIS — E78.2 MIXED HYPERLIPIDEMIA: ICD-10-CM

## 2024-06-04 DIAGNOSIS — E55.9 VITAMIN D DEFICIENCY: ICD-10-CM

## 2024-06-04 DIAGNOSIS — Z79.899 MEDICATION MANAGEMENT: ICD-10-CM

## 2024-06-04 DIAGNOSIS — Z13.29 SCREENING FOR THYROID DISORDER: ICD-10-CM

## 2024-06-12 ENCOUNTER — HOSPITAL ENCOUNTER (OUTPATIENT)
Dept: GENERAL RADIOLOGY | Facility: SURGERY CENTER | Age: 64
End: 2024-06-12
Payer: COMMERCIAL

## 2024-06-12 ENCOUNTER — HOSPITAL ENCOUNTER (OUTPATIENT)
Facility: SURGERY CENTER | Age: 64
Setting detail: HOSPITAL OUTPATIENT SURGERY
Discharge: HOME | End: 2024-06-12
Attending: ANESTHESIOLOGY | Admitting: ANESTHESIOLOGY
Payer: COMMERCIAL

## 2024-06-12 VITALS
HEART RATE: 75 BPM | SYSTOLIC BLOOD PRESSURE: 126 MMHG | DIASTOLIC BLOOD PRESSURE: 75 MMHG | RESPIRATION RATE: 18 BRPM | OXYGEN SATURATION: 98 % | TEMPERATURE: 97.7 F

## 2024-06-12 DIAGNOSIS — Z41.9 SURGERY, ELECTIVE: ICD-10-CM

## 2024-06-12 DIAGNOSIS — G89.29 CHRONIC LOW BACK PAIN, UNSPECIFIED BACK PAIN LATERALITY, UNSPECIFIED WHETHER SCIATICA PRESENT: ICD-10-CM

## 2024-06-12 DIAGNOSIS — M54.16 LUMBAR RADICULOPATHY: ICD-10-CM

## 2024-06-12 DIAGNOSIS — M54.50 CHRONIC LOW BACK PAIN, UNSPECIFIED BACK PAIN LATERALITY, UNSPECIFIED WHETHER SCIATICA PRESENT: ICD-10-CM

## 2024-06-12 PROCEDURE — 25510000001 IOPAMIDOL 61 % SOLUTION 30 ML VIAL: Performed by: ANESTHESIOLOGY

## 2024-06-12 PROCEDURE — 77002 NEEDLE LOCALIZATION BY XRAY: CPT

## 2024-06-12 PROCEDURE — 25010000002 METHYLPREDNISOLONE PER 80 MG: Performed by: ANESTHESIOLOGY

## 2024-06-12 PROCEDURE — 25010000002 BUPIVACAINE (PF) 0.5 % SOLUTION 10 ML VIAL: Performed by: ANESTHESIOLOGY

## 2024-06-12 PROCEDURE — S0260 H&P FOR SURGERY: HCPCS | Performed by: ANESTHESIOLOGY

## 2024-06-12 PROCEDURE — 62323 NJX INTERLAMINAR LMBR/SAC: CPT | Performed by: ANESTHESIOLOGY

## 2024-06-12 PROCEDURE — 76000 FLUOROSCOPY <1 HR PHYS/QHP: CPT

## 2024-06-12 NOTE — H&P
Brief Pre-procedural / Pre-operative H&P        -----    Pertinent Diagnosis:   Left lumbar radiculopathy.  Lumbar spondylolisthesis    Proposed Procedure: Lumbar epidural steroid injection, anticipated L3-L4      Subjective   Rorosetta Perez is a 64 y.o. female  who presents for intervention.  She has a history of pain and left leg pain.      History of Present Illness     She has had significant relief from a couple of epidurals previously.  She is having some returning pain in the low back on the left and radiates laterally and anteriorly into the left thigh approximating that L3 dermatome with continuous aching and there is burning and shooting elements also.  With standing and walking long distances rising from a sitting to standing position bending and twisting will flare her pain.    -------    The following portions of the patient's history were reviewed and updated as appropriate: allergies, current medications, past family history, past medical history, past social history, past surgical history and problem list.    Allergies   Allergen Reactions    Codeine Hives    Sulfa Antibiotics Rash       No current facility-administered medications for this encounter.    No current facility-administered medications on file prior to encounter.     Current Outpatient Medications on File Prior to Encounter   Medication Sig Dispense Refill    fenofibrate (TRICOR) 145 MG tablet TAKE 1 TABLET BY MOUTH EVERY DAY 90 tablet 2    Multiple Vitamins-Minerals (CENTRUM SILVER 50+WOMEN PO) Take 1 tablet by mouth Daily.      albuterol sulfate  (90 Base) MCG/ACT inhaler INHALE 2 PUFFS BY MOUTH EVERY 4 HOURS AS NEEDED FOR WHEEZING 8.5 g 2    ALPRAZolam (Xanax) 0.5 MG tablet One half to one tablet daily as needed for anxiety, caution sedation do not drive. 45 tablet 0         Lumbar radiculopathy    Chronic low back pain       Past Medical History:   Diagnosis Date    Abnormal finding on mammography 05/05/2016    Anemia     Anxiety      Benign breast lumps     Bloating     Bursitis of shoulder, right 2019    Cervical disc disorder     Chronic left-sided low back pain without sciatica     Chronic right shoulder pain     Colon polyps     FOLLOWED BY DR. CATE LANG    Depression     DIAZ (dyspnea on exertion) 2018    Extremity pain     Fatigue     Fractures 2014    Left Ankle    Headache     Headache, tension-type 1990    Health maintenance examination 2017    High risk medication use 2018    Hyperlipidemia     MIXED    Insomnia     Joint pain     Knees    Kidney stones     Lumbar radiculopathy 2023    Migraine     OA (osteoarthritis)     Osteopenia     Postmenopausal     Skin tag of anus     Urgency incontinence 2016    Vitamin D deficiency     Weakness of shoulder     RIGHT SHOULDER    Weight gain        Past Surgical History:   Procedure Laterality Date     SECTION N/A 1997     SECTION N/A 2000    COLONOSCOPY N/A 2020    3 CM LESION IN PERIANAL-SUSPECTED CONDYLOMA, MULTIPLE MEDIUM DIVERTICULA IN SIGMOID, 3 HYPERPLASTIC POLYPS IN RECTUM, MEDIUM INTERNAL HEMORRHOIDS, DR. CATE LANG AT [facility]      EPIDURAL BLOCK      EYE SURGERY  2005    Lasik    HEMORRHOIDECTOMY N/A 2020    Procedure: RIGHT POSTERIOR ANAL TAG EXCISION;  Surgeon: Tessa Ball MD;  Location: [facility] MAIN OR;  Service: General;  Laterality: N/A;    LASER ABLATION  2002    LASIK Bilateral     LUMBAR EPIDURAL INJECTION N/A 2023    Procedure: L3-L4 LUMBAR EPIDURAL STEROID INJECTION CPT:99157;  Surgeon: Raul Moreira MD;  [place] [facility] [place];  Service: Pain Management;  Laterality: N/A;    LUMBAR EPIDURAL INJECTION N/A 2024    Procedure: L3-L4 LUMBAR EPIDURAL STEROID INJECTION CPT: 94808;  Surgeon: Raul Moreira MD;  [place] [facility] [place];  Service: Pain Management;  Laterality: N/A;       Family History   Problem Relation Age of Onset    Osteoporosis Mother     Cancer  Mother          2023    Colon polyps Mother     Breast cancer Mother     Stroke Mother     Osteoporosis Maternal Grandmother     No Known Problems Father     No Known Problems Sister     No Known Problems Daughter     No Known Problems Daughter     Malig Hyperthermia Neg Hx        Social History     Socioeconomic History    Marital status:    Tobacco Use    Smoking status: Every Day     Current packs/day: 0.50     Average packs/day: 0.5 packs/day for 20.0 years (10.0 ttl pk-yrs)     Types: Cigarettes    Smokeless tobacco: Never    Tobacco comments:     last smoke 20   Substance and Sexual Activity    Alcohol use: No    Drug use: No    Sexual activity: Not Currently     Birth control/protection: Post-menopausal     Comment: .       -------       Review of Systems  No Fever, No Chills, No ear pain, No sinus pressure or drainage, No eye pain or drainage, No cough, No SOB, No chest tightness nor chest pain, no palpitations.          Vitals:    24 1358 24 1421   BP: 128/95 140/78   BP Location: Left arm    Patient Position: Sitting    Pulse: 83 88   Resp: 16 16   Temp: 98 °F (36.7 °C)    TempSrc: Temporal    SpO2: 95% 95%         Objective   Physical Exam  VSS, NNR, NCAT, NMNA, NRD, AAOx3.      -------    Assessment & Plan:  - as noted above, the stated intervention is indicated  - Follow-up plan will be noted in the operative report        Follow-up in a month      EMR Dragon/Transcription disclaimer:   Typed items in this encounter note may have been created by electronic transcription/translation software which converts spoken language to printed text. The electronic translation of spoken language may permit erroneous, or at times, nonsensical words or phrases to be inadvertently transcribed; Although I have reviewed the note for such errors, some may still exist.

## 2024-06-12 NOTE — OP NOTE
Lumbar Epidural Steroid Injection  [facility]    PREOPERATIVE DIAGNOSIS:   Lumbar Spinal Stenosis unspecified regarding Neurogenic Claudication, Spondylolisthesis, and left Lumbar Radiculopathy  POSTOPERATIVE DIAGNOSIS:  Same as preop diagnosis    PROCEDURE:   Lumbar Epidural Steroid Injection, Therapeutic Translaminar Injection, with epidurogram, at  L3/L4 level    PRE-PROCEDURE DISCUSSION WITH PATIENT:    Risks and complications were discussed with the patient prior to starting the procedure and informed consent was obtained.  We discussed various topics including but not limited to bleeding, infection, injury, paralysis, nerve injury, dural puncture, coma, death, worsening of clinical picture, lack of pain relief, and postprocedural soreness.    SURGEON:  Raul Moreira MD    REASON FOR PROCEDURE:    Degenerative changes are noted in the area. and Radiating pattern of pain is likely consistent with degenerative changes in the area.    SEDATION:  Patient declined administration of moderate sedation    ANESTHETIC:  Marcaine 0.25%  STEROID:   Methylprednisolone (DEPO MEDROL) 80mg/ml    DESCRIPTON OF PROCEDURE:    After obtaining informed consent, I.V. was not started in the preop area.   The patient was taken to the operating room and placed in the prone position.  EKG, blood pressure, and pulse oximeter were monitored throughout, and sedation was provided as needed by the RN under my guidance. All pressure points were well padded.  The lumbar spine area was prepped with Chloraprep and draped in a sterile fashion.  Under fluoroscopic guidance, the above mentioned interlaminar space was identified. Skin and subcutaneous tissues were anesthetized with 1% lidocaine in the middle of the space. A Tuohy needle was introduced through the skin and advanced to this interlaminar space and into the epidural space under fluoroscopic guidance and verified with loss-of-resistance technique to air.  After  confirming the position of the needle with the fluoroscope with all the views, and after aspiration was confirmed negative for blood and CSF, 1.5 mL of Omnipaque was injected.  After seeing appropriate epidurogram with lateral and PA views, a total of 3 cc solution was injected, consisting of 1cc of local anesthetic as above, with normal saline and injectable steroid as above.     ESTIMATED BLOOD LOSS:  <5 mL  SPECIMENS:  None    COMPLICATIONS:     No complications were noted., There was no indication of vascular uptake on live injection of contrast dye., There was no indication of intrathecal uptake on live injection of contrast dye., There was not any evidence of dural puncture.  , and The patient did not have any signs of postprocedure numbness nor weakness.    TOLERANCE & DISCHARGE CONDITION:    The patient tolerated the procedure well.  The patient was transported to the recovery area without difficulties.  The patient was discharged to home under the care of family in stable and satisfactory condition.    PLAN OF CARE:  The patient was given our standard instruction sheet.  The patient will Return to clinic 4 wks  The patient will resume all medications as per the medication reconciliation sheet.

## 2024-06-12 NOTE — DISCHARGE INSTRUCTIONS
[facility] - Post-procedure Instructions          --  While there are no absolute restrictions, it is recommended that you do not perform strenuous activity today. In the morning, you may resume your level of activity as before your block.    --  If you have a band-aid at your injection site, please remove it later today. Observe the area for any redness, swelling, pus-like drainage, or a temperature over 101°. If any of these symptoms occur, please call your doctor at [phone]. If after office hours, leave a message and the on-call provider will return your call.    --  Ice may be applied to your injection site. It is recommended you avoid direct heat (heating pad; hot tub) for 1-2 days.    --  Call [facility] at [phone] if you experience persistent headache, persistent bleeding from the injection site, or severe pain not relieved by heat or oral medication.    --  Do not make important decisions today.    --  Due to the effects of the block and/or the I.V. Sedation, DO NOT drive or operate hazardous machinery for 12 hours.  Local anesthetics may cause numbness after procedure and precautions must be taken with regards to operating equipment as well as with walking, even if ambulating with assistance of another person or with an assistive device.    --  Do not drink alcohol for 12 hours.    -- You may return to work tomorrow, or as directed by your referring doctor.    --  Occasionally you may notice a slight increase in your pain after the procedure. This should start to improve within the next 24-48 hours. Radiofrequency ablation procedure pain may last 3-4 weeks.    --  It may take as long as 3-4 days before you notice a gradual improvement in your pain and/or other symptoms.    -- You may continue to take your prescribed pain medication as needed.    --  Some normal possible side effects of steroid use could include fluid retention, increased blood sugar, dull headache,  increased sweating, increased appetite, mood swings and flushing.    --  Diabetics are recommended to watch their blood glucose level closely for 24-48 hours after the injection.    --  Must stay in PACU for 20 min upon arrival and prove no leg weakness before being discharged.    --  IN THE EVENT OF A LIFE THREATENING EMERGENCY, (CHEST PAIN, BREATHING DIFFICULTIES, PARALYSIS…) YOU SHOULD GO TO YOUR NEAREST EMERGENCY ROOM.    --  You should be contacted by our office within 2-3 days to schedule follow up or next appointment date.  If not contacted within 7 days, please call the office at [phone]

## 2024-06-13 ENCOUNTER — TRANSCRIBE ORDERS (OUTPATIENT)
Dept: ADMINISTRATIVE | Facility: HOSPITAL | Age: 64
End: 2024-06-13
Payer: COMMERCIAL

## 2024-06-13 DIAGNOSIS — Z13.9 SCREENING DUE: Primary | ICD-10-CM

## 2024-06-14 ENCOUNTER — LAB (OUTPATIENT)
Dept: LAB | Facility: HOSPITAL | Age: 64
End: 2024-06-14
Payer: COMMERCIAL

## 2024-06-14 LAB
25(OH)D3 SERPL-MCNC: 55.9 NG/ML (ref 30–100)
ALBUMIN SERPL-MCNC: 4.5 G/DL (ref 3.5–5.2)
ALBUMIN/GLOB SERPL: 1.6 G/DL
ALP SERPL-CCNC: 102 U/L (ref 39–117)
ALT SERPL W P-5'-P-CCNC: 11 U/L (ref 1–33)
ANION GAP SERPL CALCULATED.3IONS-SCNC: 9 MMOL/L (ref 5–15)
AST SERPL-CCNC: 15 U/L (ref 1–32)
BASOPHILS # BLD AUTO: 0.08 10*3/MM3 (ref 0–0.2)
BASOPHILS NFR BLD AUTO: 0.7 % (ref 0–1.5)
BILIRUB SERPL-MCNC: 0.2 MG/DL (ref 0–1.2)
BUN SERPL-MCNC: 24 MG/DL (ref 8–23)
BUN/CREAT SERPL: 25.5 (ref 7–25)
CALCIUM SPEC-SCNC: 9.9 MG/DL (ref 8.6–10.5)
CHLORIDE SERPL-SCNC: 104 MMOL/L (ref 98–107)
CHOLEST SERPL-MCNC: 216 MG/DL (ref 0–200)
CO2 SERPL-SCNC: 28 MMOL/L (ref 22–29)
CREAT SERPL-MCNC: 0.94 MG/DL (ref 0.57–1)
DEPRECATED RDW RBC AUTO: 46.6 FL (ref 37–54)
EGFRCR SERPLBLD CKD-EPI 2021: 67.5 ML/MIN/1.73
EOSINOPHIL # BLD AUTO: 0.15 10*3/MM3 (ref 0–0.4)
EOSINOPHIL NFR BLD AUTO: 1.3 % (ref 0.3–6.2)
ERYTHROCYTE [DISTWIDTH] IN BLOOD BY AUTOMATED COUNT: 13.8 % (ref 12.3–15.4)
GLOBULIN UR ELPH-MCNC: 2.9 GM/DL
GLUCOSE SERPL-MCNC: 79 MG/DL (ref 65–99)
HBA1C MFR BLD: 5.3 % (ref 4.8–5.6)
HCT VFR BLD AUTO: 44.2 % (ref 34–46.6)
HDLC SERPL-MCNC: 48 MG/DL (ref 40–60)
HGB BLD-MCNC: 14.1 G/DL (ref 12–15.9)
IMM GRANULOCYTES # BLD AUTO: 0.08 10*3/MM3 (ref 0–0.05)
IMM GRANULOCYTES NFR BLD AUTO: 0.7 % (ref 0–0.5)
LDLC SERPL CALC-MCNC: 141 MG/DL (ref 0–100)
LDLC/HDLC SERPL: 2.88 {RATIO}
LYMPHOCYTES # BLD AUTO: 2.89 10*3/MM3 (ref 0.7–3.1)
LYMPHOCYTES NFR BLD AUTO: 25.1 % (ref 19.6–45.3)
MCH RBC QN AUTO: 29.1 PG (ref 26.6–33)
MCHC RBC AUTO-ENTMCNC: 31.9 G/DL (ref 31.5–35.7)
MCV RBC AUTO: 91.1 FL (ref 79–97)
MONOCYTES # BLD AUTO: 0.81 10*3/MM3 (ref 0.1–0.9)
MONOCYTES NFR BLD AUTO: 7 % (ref 5–12)
NEUTROPHILS NFR BLD AUTO: 65.2 % (ref 42.7–76)
NEUTROPHILS NFR BLD AUTO: 7.52 10*3/MM3 (ref 1.7–7)
NRBC BLD AUTO-RTO: 0 /100 WBC (ref 0–0.2)
PLATELET # BLD AUTO: 333 10*3/MM3 (ref 140–450)
PMV BLD AUTO: 10.7 FL (ref 6–12)
POTASSIUM SERPL-SCNC: 4.6 MMOL/L (ref 3.5–5.2)
PROT SERPL-MCNC: 7.4 G/DL (ref 6–8.5)
RBC # BLD AUTO: 4.85 10*6/MM3 (ref 3.77–5.28)
SODIUM SERPL-SCNC: 141 MMOL/L (ref 136–145)
TRIGL SERPL-MCNC: 150 MG/DL (ref 0–150)
TSH SERPL DL<=0.05 MIU/L-ACNC: 1.89 UIU/ML (ref 0.27–4.2)
VLDLC SERPL-MCNC: 27 MG/DL (ref 5–40)
WBC NRBC COR # BLD AUTO: 11.53 10*3/MM3 (ref 3.4–10.8)

## 2024-06-14 PROCEDURE — 84443 ASSAY THYROID STIM HORMONE: CPT | Performed by: NURSE PRACTITIONER

## 2024-06-14 PROCEDURE — 80307 DRUG TEST PRSMV CHEM ANLYZR: CPT | Performed by: NURSE PRACTITIONER

## 2024-06-14 PROCEDURE — 83036 HEMOGLOBIN GLYCOSYLATED A1C: CPT | Performed by: NURSE PRACTITIONER

## 2024-06-14 PROCEDURE — 82306 VITAMIN D 25 HYDROXY: CPT | Performed by: NURSE PRACTITIONER

## 2024-06-14 PROCEDURE — G0481 DRUG TEST DEF 8-14 CLASSES: HCPCS | Performed by: NURSE PRACTITIONER

## 2024-06-14 PROCEDURE — 85025 COMPLETE CBC W/AUTO DIFF WBC: CPT | Performed by: NURSE PRACTITIONER

## 2024-06-14 PROCEDURE — 80053 COMPREHEN METABOLIC PANEL: CPT | Performed by: NURSE PRACTITIONER

## 2024-06-14 PROCEDURE — 80061 LIPID PANEL: CPT | Performed by: NURSE PRACTITIONER

## 2024-06-26 LAB — DRUGS UR: NORMAL

## 2024-07-01 ENCOUNTER — OFFICE VISIT (OUTPATIENT)
Dept: FAMILY MEDICINE CLINIC | Facility: CLINIC | Age: 64
End: 2024-07-01
Payer: COMMERCIAL

## 2024-07-01 VITALS
DIASTOLIC BLOOD PRESSURE: 79 MMHG | HEART RATE: 96 BPM | OXYGEN SATURATION: 95 % | BODY MASS INDEX: 27.15 KG/M2 | HEIGHT: 67 IN | WEIGHT: 173 LBS | SYSTOLIC BLOOD PRESSURE: 128 MMHG | RESPIRATION RATE: 18 BRPM

## 2024-07-01 DIAGNOSIS — Z00.00 HEALTH MAINTENANCE EXAMINATION: Primary | ICD-10-CM

## 2024-07-01 DIAGNOSIS — M54.16 LUMBAR RADICULOPATHY: ICD-10-CM

## 2024-07-01 DIAGNOSIS — F41.9 ANXIETY: ICD-10-CM

## 2024-07-01 DIAGNOSIS — Z78.0 POSTMENOPAUSAL: ICD-10-CM

## 2024-07-01 DIAGNOSIS — R73.03 PREDIABETES: ICD-10-CM

## 2024-07-01 DIAGNOSIS — E78.2 MIXED HYPERLIPIDEMIA: ICD-10-CM

## 2024-07-01 PROCEDURE — 99397 PER PM REEVAL EST PAT 65+ YR: CPT | Performed by: NURSE PRACTITIONER

## 2024-07-01 RX ORDER — DULOXETIN HYDROCHLORIDE 60 MG/1
60 CAPSULE, DELAYED RELEASE ORAL 2 TIMES DAILY
Qty: 180 CAPSULE | Refills: 3 | Status: SHIPPED | OUTPATIENT
Start: 2024-07-01

## 2024-07-01 RX ORDER — ATORVASTATIN CALCIUM 10 MG/1
10 TABLET, FILM COATED ORAL DAILY
Qty: 90 TABLET | Refills: 3 | Status: SHIPPED | OUTPATIENT
Start: 2024-07-01

## 2024-07-01 RX ORDER — MIRTAZAPINE 15 MG/1
15 TABLET, FILM COATED ORAL
Qty: 90 TABLET | Refills: 3 | Status: SHIPPED | OUTPATIENT
Start: 2024-07-01

## 2024-07-01 RX ORDER — FENOFIBRATE 145 MG/1
145 TABLET, COATED ORAL DAILY
Qty: 90 TABLET | Refills: 3 | Status: SHIPPED | OUTPATIENT
Start: 2024-07-01 | End: 2024-07-01

## 2024-07-01 RX ORDER — FENOFIBRATE 145 MG/1
72.5 TABLET, COATED ORAL DAILY
Qty: 45 TABLET | Refills: 3 | Status: SHIPPED | OUTPATIENT
Start: 2024-07-01

## 2024-07-01 RX ORDER — METFORMIN HYDROCHLORIDE 500 MG/1
500 TABLET, EXTENDED RELEASE ORAL
Qty: 90 TABLET | Refills: 3 | Status: SHIPPED | OUTPATIENT
Start: 2024-07-01

## 2024-07-01 RX ORDER — ALPRAZOLAM 0.5 MG/1
TABLET ORAL
Qty: 45 TABLET | Refills: 0 | Status: SHIPPED | OUTPATIENT
Start: 2024-07-01

## 2024-07-01 NOTE — PATIENT INSTRUCTIONS
Discharge instruction    For minimal weight loss, and to lower sugar and to decrease risk of stroke and heart attack fatty liver etc. low-dose metformin] if excessive loose stool that does not resolve after couple weeks discontinue    At a later date in a few weeks start atorvastatin 10 mg daily in addition to fenofibrate 145 1/2 tablet  This will lower your triglycerides and lower your bad cholesterol and significantly decrease risk of stroke and heart attack     severe body aches myalgia tea colored urine emergency room    Continue the new habits you have the new lifestyle,  Focus on breathing better walking better standing better sitting better    Should you have diarrhea labs, consider other weight loss solutions it may be beneficial possibly [employer] insurance may cover this and it is controlled mild stimulant with medication decreased appetite    Steady as you go, will given you a new tool low-dose metformin can help a little,  Continue to avoid the foods that you need to do, continue with present healthy diet intermittent fasting that works for you vegetables toast with vegetables,  Start metformin with breakfast discontinue if difficulties in a couple weeks then start atorvastatin and decrease fenofibrate by 1/2 tablet    Labs in about 3 months nonfasting okay I will see you back to discuss the results and see how you are doing,  Challenge you to stay within 5 pounds of your present weight      Immunizations you may be due Prevnar 20   Tdap  RSV     Follow-up with GYN for Pap smear      Consider CT calcium score of coronary arteries consider vascular screenings and carotid aorta and lower extremity

## 2024-07-02 RX ORDER — ALBUTEROL SULFATE 90 UG/1
2 AEROSOL, METERED RESPIRATORY (INHALATION) EVERY 4 HOURS PRN
Qty: 6.7 G | Refills: 2 | Status: SHIPPED | OUTPATIENT
Start: 2024-07-02

## 2024-07-02 NOTE — TELEPHONE ENCOUNTER
I changed the quantity maybe that is what they are referring to?  I gave the pharmacist permission to substitute  With a message and the prescription    Let me know if this does not work thank you

## 2024-07-08 ENCOUNTER — TRANSCRIBE ORDERS (OUTPATIENT)
Dept: SURGERY | Facility: SURGERY CENTER | Age: 64
End: 2024-07-08
Payer: COMMERCIAL

## 2024-07-08 ENCOUNTER — OFFICE VISIT (OUTPATIENT)
Dept: PAIN MEDICINE | Facility: CLINIC | Age: 64
End: 2024-07-08
Payer: COMMERCIAL

## 2024-07-08 ENCOUNTER — PREP FOR SURGERY (OUTPATIENT)
Dept: SURGERY | Facility: SURGERY CENTER | Age: 64
End: 2024-07-08
Payer: COMMERCIAL

## 2024-07-08 VITALS
HEART RATE: 87 BPM | WEIGHT: 176.4 LBS | DIASTOLIC BLOOD PRESSURE: 65 MMHG | OXYGEN SATURATION: 97 % | SYSTOLIC BLOOD PRESSURE: 99 MMHG | BODY MASS INDEX: 27.69 KG/M2 | HEIGHT: 67 IN | TEMPERATURE: 97.7 F

## 2024-07-08 DIAGNOSIS — G89.29 CHRONIC LOW BACK PAIN, UNSPECIFIED BACK PAIN LATERALITY, UNSPECIFIED WHETHER SCIATICA PRESENT: ICD-10-CM

## 2024-07-08 DIAGNOSIS — M54.16 LUMBAR RADICULOPATHY: Primary | ICD-10-CM

## 2024-07-08 DIAGNOSIS — M54.16 LUMBAR RADICULOPATHY: ICD-10-CM

## 2024-07-08 DIAGNOSIS — Z41.9 SURGERY, ELECTIVE: Primary | ICD-10-CM

## 2024-07-08 DIAGNOSIS — M54.50 CHRONIC LOW BACK PAIN, UNSPECIFIED BACK PAIN LATERALITY, UNSPECIFIED WHETHER SCIATICA PRESENT: ICD-10-CM

## 2024-07-08 DIAGNOSIS — M51.36 DDD (DEGENERATIVE DISC DISEASE), LUMBAR: Primary | ICD-10-CM

## 2024-07-08 PROCEDURE — 99214 OFFICE O/P EST MOD 30 MIN: CPT

## 2024-07-08 NOTE — PROGRESS NOTES
CHIEF COMPLAINT  Back pain    Subjective   Ro Perez is a 64 y.o. female  who presents to the office for follow-up of procedure.  She completed a L3/L4 LESI on 6/12/24 performed by Dr. Moreira for management of back pain. Patient reports 20% ongoing relief from the procedure.      Today pain is 7/10VAS in severity (severity in pain varies based on activity level). Pain is located in her low back (left side worse than right) and radiates into left hip/anterior thigh and down lateral leg into ankle. Describes this pain as a nearly continuous aching, burning, and shooting. Pain is worsened by walking long distances, prolonged sitting or standing, rising from sitting to standing position, and bending/twisting. Pain improve with rest/reposition, heat, and OTC Aleve/Ibuprofen PRN. She has completed PT since her last office visit with mixed results.      Patient works at [employer].     Procedures:  6/12/24 - L3-L4 LESI - 20% ongoing relief   2/20/24 - L3-L4 LESI - 100% x 2.5 months  11/1/23 - L3-L4 LESI - 100% relief x 2.5 months    Back Pain  This is a chronic problem. The current episode started more than 1 year ago. The problem occurs constantly. The problem has been gradually worsening since onset. The quality of the pain is described as shooting, aching and burning. The pain does not radiate (left anterior thigh and left lateral leg into ankle). The pain is at a severity of 7/10 (severity in pain varies based on activity level). The pain is moderate. The pain is The same all the time. The symptoms are aggravated by position, sitting and standing (walking long distances,). Pertinent negatives include no abdominal pain, chest pain, dysuria, fever, headaches, numbness or weakness. She has tried chiropractic manipulation, heat and NSAIDs (PT,) for the symptoms. The treatment provided mild relief.     PEG Assessment   What number best describes your pain on average in the past week?6  What number best describes how, during the  "past week, pain has interfered with your enjoyment of life?5  What number best describes how, during the past week, pain has interfered with your general activity?  5    Review of Pertinent Medical Data ---      The following portions of the patient's history were reviewed and updated as appropriate: allergies, current medications, past family history, past medical history, past social history, past surgical history, and problem list.    Review of Systems   Constitutional:  Negative for activity change, fatigue and fever.   Cardiovascular:  Negative for chest pain.   Gastrointestinal:  Negative for abdominal pain, constipation and diarrhea.   Genitourinary:  Negative for difficulty urinating and dysuria.   Musculoskeletal:  Positive for back pain.   Neurological:  Negative for dizziness, weakness, light-headedness, numbness and headaches.   Psychiatric/Behavioral:  Negative for agitation, sleep disturbance and suicidal ideas. The patient is not nervous/anxious.      I have reviewed and confirmed the accuracy of the ROS as documented by the MA/LPN/RN VINH Phillips    Vitals:    07/08/24 0839   BP: 99/65   BP Location: Left arm   Patient Position: Sitting   Cuff Size: Adult   Pulse: 87   Temp: 97.7 °F (36.5 °C)   SpO2: 97%   Weight: 80 kg (176 lb 6.4 oz)   Height: 170.2 cm (67.01\")   PainSc:   7   PainLoc: Back     Objective   Physical Exam  Constitutional:       Appearance: Normal appearance.   HENT:      Head: Normocephalic.   Cardiovascular:      Rate and Rhythm: Normal rate and regular rhythm.   Pulmonary:      Effort: Pulmonary effort is normal.      Breath sounds: Normal breath sounds.   Musculoskeletal:      Cervical back: Normal range of motion.      Lumbar back: Tenderness present. Positive left straight leg raise test. Negative right straight leg raise test.      Left hip: Tenderness present.      Comments: + lumbar facet loading/tenderness   Skin:     General: Skin is warm and dry.      Capillary " Refill: Capillary refill takes less than 2 seconds.   Neurological:      General: No focal deficit present.      Mental Status: She is alert and oriented to person, place, and time.   Psychiatric:         Mood and Affect: Mood normal.         Behavior: Behavior normal.         Thought Content: Thought content normal.         Cognition and Memory: Cognition normal.       Assessment & Plan   Diagnoses and all orders for this visit:    1. DDD (degenerative disc disease), lumbar (Primary)    2. Lumbar radiculopathy      Ro Perez reports a pain score of 7.  Given her pain assessment as noted, treatment options were discussed and the following options were decided upon as a follow-up plan to address the patient's pain: continuation of current treatment plan for pain and steroid injections.    --- Left L4 & L5 TF LESI  ---  Indications for epidural injection:  Plan is to proceed with epidural at the appropriate level.  If the patient receives significant pain reduction and improvement in function and the plan will be to repeat the epidural when the pain worsens.  If a second epidural provides at least 6 weeks of sustained improvement that includes both pain reduction and improvement in function then an epidural injection could be repeated once again at the same level.  This is a mutual decision between the clinician and the patient that includes discussions including risks and benefits in detail as well as alternative therapies.  Patient's questions were answered to their satisfaction and to their understanding.  ---   --- Follow-up for procedure     MANJULA REPORT  As the clinician, I personally reviewed the MANJULA from 7/8/24 while the patient was in the office today.    Dictated utilizing Dragon dictation.

## 2024-07-09 PROBLEM — Z78.0 POSTMENOPAUSAL: Status: ACTIVE | Noted: 2024-07-09

## 2024-07-09 PROBLEM — R73.03 PREDIABETES: Status: ACTIVE | Noted: 2024-07-09

## 2024-07-09 NOTE — PROGRESS NOTES
"Chief Complaint  Med Refill (Yearly medication refills ) and Annual Exam (Pt here for annual )    Subjective        Ro Perez presents to [facility]  History of Present Illness  Pleasant patient here today for annual exam health maintenance, she has been doing quite well duloxetine chronic anxiety stable Tricor elevated triglycerides, Remeron for anxiety and sleep, helpful atorvastatin for cholesterol metformin for prediabetes, she has been doing well over the last year,  Based on up-to-date recommendations for obesity with prediabetes and the benefit of the GLP-1 agonist since Ozempic recommendations she had done well unfortunately insurance reviewed  And will not cover for prediabetes  Despite losing of substantial amount of weight improving health and feeling better    Social history no change   Past medical history as above family history no change  Rare use Xanax always appropriate drug screens have been fine controlled subs agreement requested update      Objective   Vital Signs:  /79   Pulse 96   Resp 18   Ht 170.2 cm (67.01\")   Wt 78.5 kg (173 lb)   SpO2 95%   BMI 27.09 kg/m²   Estimated body mass index is 27.09 kg/m² as calculated from the following:    Height as of this encounter: 170.2 cm (67.01\").    Weight as of this encounter: 78.5 kg (173 lb).            Physical Exam  Vitals reviewed.   Constitutional:       Appearance: Normal appearance. She is well-developed. She is not ill-appearing or diaphoretic.      Comments: Improved overall health appears well obvious weight loss   HENT:      Head: Normocephalic.      Right Ear: Tympanic membrane normal.      Left Ear: Tympanic membrane normal.      Nose: Nose normal.   Eyes:      General: No scleral icterus.     Conjunctiva/sclera: Conjunctivae normal.      Pupils: Pupils are equal, round, and reactive to light.   Neck:      Thyroid: No thyromegaly.      Vascular: No JVD.   Cardiovascular:      Rate and Rhythm: " Normal rate and regular rhythm.      Heart sounds: Normal heart sounds. No murmur heard.     No friction rub. No gallop.   Pulmonary:      Effort: Pulmonary effort is normal. No respiratory distress.      Breath sounds: Normal breath sounds. No stridor. No wheezing or rales.   Abdominal:      General: Bowel sounds are normal. There is no distension.      Palpations: Abdomen is soft.      Tenderness: There is no abdominal tenderness.      Comments: No hepatosplenomegaly, no ascites,   Musculoskeletal:         General: No tenderness.      Cervical back: Neck supple.   Lymphadenopathy:      Cervical: No cervical adenopathy.   Skin:     General: Skin is warm and dry.      Findings: No erythema or rash.   Neurological:      Mental Status: She is alert and oriented to person, place, and time.      Deep Tendon Reflexes: Reflexes are normal and symmetric.   Psychiatric:         Behavior: Behavior normal.         Thought Content: Thought content normal.         Judgment: Judgment normal.        Result Review :                Assessment and Plan   Diagnoses and all orders for this visit:    1. Health maintenance examination (Primary)  -     Hemoglobin A1c; Future  -     Comprehensive Metabolic Panel; Future  -     Lipid Panel With LDL / HDL Ratio; Future    2. Lumbar radiculopathy  -     Hemoglobin A1c; Future  -     Comprehensive Metabolic Panel; Future  -     Lipid Panel With LDL / HDL Ratio; Future    3. Mixed hyperlipidemia  -     Hemoglobin A1c; Future  -     Comprehensive Metabolic Panel; Future  -     Lipid Panel With LDL / HDL Ratio; Future    4. Anxiety  -     ALPRAZolam (Xanax) 0.5 MG tablet; One half to one tablet daily as needed for anxiety, caution sedation do not drive.  Dispense: 45 tablet; Refill: 0  -     Hemoglobin A1c; Future  -     Comprehensive Metabolic Panel; Future  -     Lipid Panel With LDL / HDL Ratio; Future    5. Postmenopausal  -     DEXA Bone Density Axial; Future    6. Prediabetes    Other  orders  -     DULoxetine (CYMBALTA) 60 MG capsule; Take 1 capsule by mouth 2 (Two) Times a Day.  Dispense: 180 capsule; Refill: 3  -     Discontinue: fenofibrate (TRICOR) 145 MG tablet; Take 1 tablet by mouth Daily.  Dispense: 90 tablet; Refill: 3  -     mirtazapine (REMERON) 15 MG tablet; Take 1 tablet by mouth every night at bedtime.  Dispense: 90 tablet; Refill: 3  -     fenofibrate (TRICOR) 145 MG tablet; Take 0.5 tablets by mouth Daily.  Dispense: 45 tablet; Refill: 3  -     atorvastatin (LIPITOR) 10 MG tablet; Take 1 tablet by mouth Daily. To decrease risk of stroke and heart attack  Dispense: 90 tablet; Refill: 3  -     metFORMIN ER (GLUCOPHAGE-XR) 500 MG 24 hr tablet; Take 1 tablet by mouth Daily With Breakfast. For prediabetes in good health  Dispense: 90 tablet; Refill: 3             Follow Up   Return in about 3 months (around 10/1/2024) for Labs before next visit, Annual physical.  Patient was given instructions and counseling regarding her condition or for health maintenance advice. Please see specific information pulled into the AVS if appropriate.   Continue healthy diet and exercise discussed weight watchers bioidentical semaglutide not FDA approved but may be an option for her potentially?    Patient Instructions   Discharge instruction    For minimal weight loss, and to lower sugar and to decrease risk of stroke and heart attack fatty liver etc. low-dose metformin] if excessive loose stool that does not resolve after couple weeks discontinue    At a later date in a few weeks start atorvastatin 10 mg daily in addition to fenofibrate 145 1/2 tablet  This will lower your triglycerides and lower your bad cholesterol and significantly decrease risk of stroke and heart attack     severe body aches myalgia tea colored urine emergency room    Continue the new habits you have the new lifestyle,  Focus on breathing better walking better standing better sitting better    Should you have diarrhea labs,  consider other weight loss solutions it may be beneficial possibly [employer] insurance may cover this and it is controlled mild stimulant with medication decreased appetite    Steady as you go, will given you a new tool low-dose metformin can help a little,  Continue to avoid the foods that you need to do, continue with present healthy diet intermittent fasting that works for you vegetables toast with vegetables,  Start metformin with breakfast discontinue if difficulties in a couple weeks then start atorvastatin and decrease fenofibrate by 1/2 tablet    Labs in about 3 months nonfasting okay I will see you back to discuss the results and see how you are doing,  Challenge you to stay within 5 pounds of your present weight      Immunizations you may be due Prevnar 20   Tdap  RSV     Follow-up with GYN for Pap smear      Consider CT calcium score of coronary arteries consider vascular screenings and carotid aorta and lower extremity

## 2024-08-05 ENCOUNTER — HOSPITAL ENCOUNTER (OUTPATIENT)
Dept: MAMMOGRAPHY | Facility: HOSPITAL | Age: 64
Discharge: HOME | End: 2024-08-05
Admitting: NURSE PRACTITIONER
Payer: COMMERCIAL

## 2024-08-05 DIAGNOSIS — Z13.9 SCREENING DUE: ICD-10-CM

## 2024-08-05 PROCEDURE — 77063 BREAST TOMOSYNTHESIS BI: CPT

## 2024-08-05 PROCEDURE — 77067 SCR MAMMO BI INCL CAD: CPT

## 2024-08-06 ENCOUNTER — HOSPITAL ENCOUNTER (OUTPATIENT)
Facility: SURGERY CENTER | Age: 64
Setting detail: HOSPITAL OUTPATIENT SURGERY
Discharge: HOME | End: 2024-08-06
Attending: ANESTHESIOLOGY | Admitting: ANESTHESIOLOGY
Payer: COMMERCIAL

## 2024-08-06 ENCOUNTER — HOSPITAL ENCOUNTER (OUTPATIENT)
Dept: GENERAL RADIOLOGY | Facility: SURGERY CENTER | Age: 64
Setting detail: HOSPITAL OUTPATIENT SURGERY
End: 2024-08-06
Payer: COMMERCIAL

## 2024-08-06 VITALS
WEIGHT: 176 LBS | DIASTOLIC BLOOD PRESSURE: 73 MMHG | RESPIRATION RATE: 20 BRPM | BODY MASS INDEX: 27.62 KG/M2 | HEART RATE: 77 BPM | SYSTOLIC BLOOD PRESSURE: 140 MMHG | HEIGHT: 67 IN | TEMPERATURE: 98.2 F | OXYGEN SATURATION: 94 %

## 2024-08-06 DIAGNOSIS — M54.16 LUMBAR RADICULOPATHY: ICD-10-CM

## 2024-08-06 DIAGNOSIS — M54.50 CHRONIC LOW BACK PAIN, UNSPECIFIED BACK PAIN LATERALITY, UNSPECIFIED WHETHER SCIATICA PRESENT: ICD-10-CM

## 2024-08-06 DIAGNOSIS — G89.29 CHRONIC LOW BACK PAIN, UNSPECIFIED BACK PAIN LATERALITY, UNSPECIFIED WHETHER SCIATICA PRESENT: ICD-10-CM

## 2024-08-06 DIAGNOSIS — Z41.9 SURGERY, ELECTIVE: ICD-10-CM

## 2024-08-06 LAB — GLUCOSE BLDC GLUCOMTR-MCNC: 80 MG/DL (ref 70–130)

## 2024-08-06 PROCEDURE — 25010000002 BUPIVACAINE (PF) 0.5 % SOLUTION 10 ML VIAL: Performed by: ANESTHESIOLOGY

## 2024-08-06 PROCEDURE — 76000 FLUOROSCOPY <1 HR PHYS/QHP: CPT

## 2024-08-06 PROCEDURE — 25510000001 IOPAMIDOL 61 % SOLUTION 30 ML VIAL: Performed by: ANESTHESIOLOGY

## 2024-08-06 PROCEDURE — 64483 NJX AA&/STRD TFRM EPI L/S 1: CPT | Performed by: ANESTHESIOLOGY

## 2024-08-06 PROCEDURE — 64484 NJX AA&/STRD TFRM EPI L/S EA: CPT | Performed by: ANESTHESIOLOGY

## 2024-08-06 PROCEDURE — 77002 NEEDLE LOCALIZATION BY XRAY: CPT

## 2024-08-06 PROCEDURE — 25010000002 METHYLPREDNISOLONE PER 80 MG: Performed by: ANESTHESIOLOGY

## 2024-08-06 NOTE — OP NOTE
Lumbar Transforaminal Epidural Steroid Injection (two levels)  [facility]      PREOPERATIVE DIAGNOSIS:  left Lumbar Radiculopathy    POSTOPERATIVE DIAGNOSIS:  Same as preop diagnosis    PROCEDURE:    1. CPT 54587 --  Diagnostic Transforaminal Epidural Steroid Injection at the L3 level, on the left   2. CPT 46255 --  Diagnostic Transforaminal Epidural Steroid Injection at the L4 level, on the left     PRE-PROCEDURE DISCUSSION WITH PATIENT:    Risks and complications were discussed with the patient prior to starting the procedure and informed consent was obtained.  We discussed various topics including but not limited to bleeding, infection, injury, nerve injury, paralysis, coma, death, postprocedural painful flare-up, postprocedural site soreness, and a lack of pain relief.  We discussed the diagnostic aspect of transforaminal epidural / selective nerve root blockade.    SURGEON:  Raul Moreira MD    REASON FOR PROCEDURE:    Degenerative changes are noted in the area. and Radiating pattern of pain is likely consistent with degenerative changes in the area.    SEDATION:  Patient declined administration of moderate sedation    ANESTHETIC:  Marcaine 0.25%  STEROID:  Methylprednisolone (DEPO MEDROL) 80mg/ml    DESCRIPTON OF PROCEDURE:  After obtaining informed consent, an I.V. was not started in the preoperative area. The patient taken to the operating room and was placed in the prone position with a pillow under the abdomen.  All pressure points were well padded.  EKG, blood pressure, and pulse oximeter were monitored.  The lumbar area was prepped with Chloraprep and draped in a sterile fashion. Under fluoroscopic guidance in an oblique dimension on the above mentioned side, the transverse process of the first aforementioned vertebra at the junction of the body at 6 o'clock position was identified. Skin and subcutaneous tissue was anesthetized with 1% lidocaine. A 22-gauge spinal needle was  introduced under fluoroscopic guidance at the above junction into the foramen without parasthesias and into the epidural space. After confirming the position of the needle with PA, lateral, and oblique fluoroscopic views, aspiration was checked and was clear of blood or CSF.  Next, 1 mL of Omnipaque was injected. After seeing adequate spread on the corresponding nerve root, a total volume 2mL of injectate containing local anesthetic as above and half of the above mentioned corticosteroid was injected into the epidural space.  The needle was removed intact.      Next, in similar fashion, the second level mentioned above was addressed and a similar amount of injectate was delivered after similar imaging was achieved.      Vital signs were stable throughout.          ESTIMATED BLOOD LOSS:  <5 mL  SPECIMENS:  none    COMPLICATIONS:   No complications were noted., There was no indication of vascular uptake on live injection of contrast dye., There was no indication of intrathecal uptake on live injection of contrast dye., There was not any evidence of dural puncture.  , and The patient did not have any signs of postprocedure numbness nor weakness.    TOLERANCE & DISCHARGE CONDITION:    The patient tolerated the procedure well.  The patient was transported to the recovery area without difficulties.  The patient was discharged to home under the care of family in stable and satisfactory condition.    PLAN OF CARE:  The patient was given our standard instruction sheet.  The patient will Return to clinic 4-6 wks.  The patient will resume all medications as per the medication reconciliation sheet.

## 2024-08-06 NOTE — DISCHARGE INSTRUCTIONS
[facility] - Post-procedure Instructions          --  While there are no absolute restrictions, it is recommended that you do not perform strenuous activity today. In the morning, you may resume your level of activity as before your block.    --  If you have a band-aid at your injection site, please remove it later today. Observe the area for any redness, swelling, pus-like drainage, or a temperature over 101°. If any of these symptoms occur, please call your doctor at [phone]. If after office hours, leave a message and the on-call provider will return your call.    --  Ice may be applied to your injection site. It is recommended you avoid direct heat (heating pad; hot tub) for 1-2 days.    --  Call [facility] at [phone] if you experience persistent headache, persistent bleeding from the injection site, or severe pain not relieved by heat or oral medication.    --  Do not make important decisions today.    --  Due to the effects of the block and/or the I.V. Sedation, DO NOT drive or operate hazardous machinery for 12 hours.  Local anesthetics may cause numbness after procedure and precautions must be taken with regards to operating equipment as well as with walking, even if ambulating with assistance of another person or with an assistive device.    --  Do not drink alcohol for 12 hours.    -- You may return to work tomorrow, or as directed by your referring doctor.    --  Occasionally you may notice a slight increase in your pain after the procedure. This should start to improve within the next 24-48 hours. Radiofrequency ablation procedure pain may last 3-4 weeks.    --  It may take as long as 3-4 days before you notice a gradual improvement in your pain and/or other symptoms.    -- You may continue to take your prescribed pain medication as needed.    --  Some normal possible side effects of steroid use could include fluid retention, increased blood sugar, dull headache,  increased sweating, increased appetite, mood swings and flushing.    --  Diabetics are recommended to watch their blood glucose level closely for 24-48 hours after the injection.    --  Must stay in PACU for 20 min upon arrival and prove no leg weakness before being discharged.    --  IN THE EVENT OF A LIFE THREATENING EMERGENCY, (CHEST PAIN, BREATHING DIFFICULTIES, PARALYSIS…) YOU SHOULD GO TO YOUR NEAREST EMERGENCY ROOM.    --  You should be contacted by our office within 2-3 days to schedule follow up or next appointment date.  If not contacted within 7 days, please call the office at [phone]

## 2024-09-09 ENCOUNTER — LAB (OUTPATIENT)
Dept: LAB | Facility: HOSPITAL | Age: 64
End: 2024-09-09
Payer: COMMERCIAL

## 2024-09-09 ENCOUNTER — OFFICE VISIT (OUTPATIENT)
Dept: PAIN MEDICINE | Facility: CLINIC | Age: 64
End: 2024-09-09
Payer: COMMERCIAL

## 2024-09-09 VITALS
HEIGHT: 67 IN | BODY MASS INDEX: 28.63 KG/M2 | SYSTOLIC BLOOD PRESSURE: 119 MMHG | HEART RATE: 83 BPM | DIASTOLIC BLOOD PRESSURE: 73 MMHG | OXYGEN SATURATION: 94 % | RESPIRATION RATE: 16 BRPM | TEMPERATURE: 98.2 F | WEIGHT: 182.4 LBS

## 2024-09-09 DIAGNOSIS — M54.16 LUMBAR RADICULOPATHY: ICD-10-CM

## 2024-09-09 DIAGNOSIS — Z00.00 HEALTH MAINTENANCE EXAMINATION: ICD-10-CM

## 2024-09-09 DIAGNOSIS — M51.36 DDD (DEGENERATIVE DISC DISEASE), LUMBAR: Primary | ICD-10-CM

## 2024-09-09 DIAGNOSIS — F41.9 ANXIETY: ICD-10-CM

## 2024-09-09 DIAGNOSIS — E78.2 MIXED HYPERLIPIDEMIA: ICD-10-CM

## 2024-09-09 LAB
ALBUMIN SERPL-MCNC: 4.3 G/DL (ref 3.5–5.2)
ALBUMIN/GLOB SERPL: 1.4 G/DL
ALP SERPL-CCNC: 97 U/L (ref 39–117)
ALT SERPL W P-5'-P-CCNC: 11 U/L (ref 1–33)
ANION GAP SERPL CALCULATED.3IONS-SCNC: 7.7 MMOL/L (ref 5–15)
AST SERPL-CCNC: 15 U/L (ref 1–32)
BILIRUB SERPL-MCNC: 0.2 MG/DL (ref 0–1.2)
BUN SERPL-MCNC: 17 MG/DL (ref 8–23)
BUN/CREAT SERPL: 15.9 (ref 7–25)
CALCIUM SPEC-SCNC: 10.1 MG/DL (ref 8.6–10.5)
CHLORIDE SERPL-SCNC: 103 MMOL/L (ref 98–107)
CHOLEST SERPL-MCNC: 186 MG/DL (ref 0–200)
CO2 SERPL-SCNC: 29.3 MMOL/L (ref 22–29)
CREAT SERPL-MCNC: 1.07 MG/DL (ref 0.57–1)
EGFRCR SERPLBLD CKD-EPI 2021: 57.8 ML/MIN/1.73
GLOBULIN UR ELPH-MCNC: 3 GM/DL
GLUCOSE SERPL-MCNC: 98 MG/DL (ref 65–99)
HBA1C MFR BLD: 5.7 % (ref 4.8–5.6)
HDLC SERPL-MCNC: 41 MG/DL (ref 40–60)
LDLC SERPL CALC-MCNC: 105 MG/DL (ref 0–100)
LDLC/HDLC SERPL: 2.4 {RATIO}
POTASSIUM SERPL-SCNC: 4.4 MMOL/L (ref 3.5–5.2)
PROT SERPL-MCNC: 7.3 G/DL (ref 6–8.5)
SODIUM SERPL-SCNC: 140 MMOL/L (ref 136–145)
TRIGL SERPL-MCNC: 233 MG/DL (ref 0–150)
VLDLC SERPL-MCNC: 40 MG/DL (ref 5–40)

## 2024-09-09 PROCEDURE — 80053 COMPREHEN METABOLIC PANEL: CPT

## 2024-09-09 PROCEDURE — 99213 OFFICE O/P EST LOW 20 MIN: CPT

## 2024-09-09 PROCEDURE — 36415 COLL VENOUS BLD VENIPUNCTURE: CPT

## 2024-09-09 PROCEDURE — 80061 LIPID PANEL: CPT

## 2024-09-09 PROCEDURE — 83036 HEMOGLOBIN GLYCOSYLATED A1C: CPT

## 2024-09-09 RX ORDER — GABAPENTIN 300 MG/1
CAPSULE ORAL
Qty: 90 CAPSULE | Refills: 0 | Status: SHIPPED | OUTPATIENT
Start: 2024-09-09

## 2024-09-09 NOTE — PROGRESS NOTES
CHIEF COMPLAINT  Back pain    Subjective   Ro Perez is a 64 y.o. female  who presents to the office for follow-up of procedure.  She completed a Left L3 & L4 TF LESI on  8/6/24 performed by Dr. Moreira for management of low back and left leg pain. Patient reports 50% ongoing relief from the procedure. She reports that she has been able to be more active since the procedure.     Today pain is 3/10VAS in severity (severity in pain varies based on activity level). Pain is located in her low back (left side worse than right) and radiates into left hip/anterior thigh and down lateral leg into ankle. Pain has improved since her procedure. Describes this pain as an intermittent aching, burning, and shooting. Pain is worsened by walking long distances, prolonged sitting or standing, rising from sitting to standing position, and bending/twisting. Pain improve with rest/reposition, heat, and OTC Aleve/Ibuprofen PRN. Mixed results with PT in the past.      Patient works at [employer].     Procedures:  8/6/24 - Left L3 & L4 TF LESI - 50% ongoing relief  6/12/24 - L3-L4 LESI - 20% ongoing relief   2/20/24 - L3-L4 LESI - 100% x 2.5 months  11/1/23 - L3-L4 LESI - 100% relief x 2.5 months    Back Pain  This is a chronic problem. The current episode started more than 1 year ago. The problem occurs intermittently. The problem has been improved since onset. The pain is present in the lumbar spine. The quality of the pain is described as shooting, aching and burning. The pain does not radiate (left anterior thigh and left lateral leg into ankle). The pain is at a severity of 3/10 (severity in pain varies based on activity level). The pain is moderate. The pain is The same all the time. The symptoms are aggravated by position, sitting and standing (walking long distances,). Pertinent negatives include no abdominal pain, chest pain, dysuria, fever, headaches, numbness or weakness. She has tried chiropractic manipulation, heat and NSAIDs (PT,)  "for the symptoms. The treatment provided mild relief.     PEG Assessment   What number best describes your pain on average in the past week?6  What number best describes how, during the past week, pain has interfered with your enjoyment of life?0  What number best describes how, during the past week, pain has interfered with your general activity?  0    Review of Pertinent Medical Data ---      The following portions of the patient's history were reviewed and updated as appropriate: allergies, current medications, past family history, past medical history, past social history, past surgical history, and problem list.    Review of Systems   Constitutional:  Negative for activity change, fatigue and fever.   Respiratory:  Negative for cough and chest tightness.    Cardiovascular:  Negative for chest pain.   Gastrointestinal:  Negative for abdominal pain, constipation and diarrhea.   Genitourinary:  Negative for difficulty urinating and dysuria.   Musculoskeletal:  Positive for back pain.   Neurological:  Negative for dizziness, weakness, light-headedness, numbness and headaches.   Psychiatric/Behavioral:  Negative for agitation, sleep disturbance and suicidal ideas. The patient is not nervous/anxious.      I have reviewed and confirmed the accuracy of the ROS as documented by the MA/LPN/RN VINH Phillips    Vitals:    09/09/24 0842   BP: 119/73   BP Location: Left arm   Patient Position: Sitting   Cuff Size: Adult   Pulse: 83   Resp: 16   Temp: 98.2 °F (36.8 °C)   TempSrc: Temporal   SpO2: 94%   Weight: 82.7 kg (182 lb 6.4 oz)   Height: 170.2 cm (67\")   PainSc:   3     Objective   Physical Exam  Constitutional:       Appearance: Normal appearance.   HENT:      Head: Normocephalic.   Cardiovascular:      Rate and Rhythm: Normal rate and regular rhythm.   Pulmonary:      Effort: Pulmonary effort is normal.      Breath sounds: Normal breath sounds.   Musculoskeletal:      Cervical back: Normal range of motion.    "   Lumbar back: Tenderness present. Positive left straight leg raise test. Negative right straight leg raise test.      Left hip: Tenderness present.      Comments: + lumbar facet loading/tenderness   Skin:     General: Skin is warm and dry.      Capillary Refill: Capillary refill takes less than 2 seconds.   Neurological:      General: No focal deficit present.      Mental Status: She is alert and oriented to person, place, and time.   Psychiatric:         Mood and Affect: Mood normal.         Behavior: Behavior normal.         Thought Content: Thought content normal.         Cognition and Memory: Cognition normal.       Assessment & Plan   Diagnoses and all orders for this visit:    1. DDD (degenerative disc disease), lumbar (Primary)  -     gabapentin (NEURONTIN) 300 MG capsule; Take 1 capsule by mouth at night for 5-7 days. If tolerating well, may increase to 1 capsule twice a day for 5-7 days with goal of taking medication three times per day  Dispense: 90 capsule; Refill: 0    2. Lumbar radiculopathy  -     gabapentin (NEURONTIN) 300 MG capsule; Take 1 capsule by mouth at night for 5-7 days. If tolerating well, may increase to 1 capsule twice a day for 5-7 days with goal of taking medication three times per day  Dispense: 90 capsule; Refill: 0      Rorosetta Perez reports a pain score of 3.  Given her pain assessment as noted, treatment options were discussed and the following options were decided upon as a follow-up plan to address the patient's pain: continuation of current treatment plan for pain and prescription for non-opiod analgesics.    --- Trial Gabapentin. The patient will be started on a trial of gabapentin. she will be started on gabapentin 300 mg once nightly for one week. Will then increase to twice daily for one week. Patient will then increase to three times daily as tolerated. Reviewed potential side effects, including somnolence and dizziness.   --- Repeat TF LESI every 3 months as needed  ---  Brief discussion on lumbar MBB/RFA if axial back pain were to worsen  --- Follow-up 1 month       MANJULA REPORT  As part of the patient's treatment plan, I am prescribing controlled substances. The patient has been made aware of appropriate use of such medications, including potential risk of somnolence, limited ability to drive and/or work safely, and the potential for dependence or overdose. It has also been made clear that these medications are for use by this patient only, without concomitant use of alcohol or other substances unless prescribed.     Patient has completed prescribing agreement detailing terms of continued prescribing of controlled substances, including monitoring MANJULA reports, urine drug screening, and pill counts if necessary. The patient is aware that inappropriate use will results in cessation of prescribing such medications.    As the clinician, I personally reviewed the MANJULA from 9/9/24 while the patient was in the office today.    History and physical exam exhibit continued safe and appropriate use of controlled substances.    Dictated utilizing Dragon dictation.

## 2024-09-30 RX ORDER — ALBUTEROL SULFATE 90 UG/1
2 INHALANT RESPIRATORY (INHALATION) EVERY 4 HOURS PRN
Qty: 6.7 G | Refills: 2 | Status: SHIPPED | OUTPATIENT
Start: 2024-09-30

## 2024-09-30 NOTE — TELEPHONE ENCOUNTER
Rx Refill Note  Requested Prescriptions     Pending Prescriptions Disp Refills    albuterol sulfate  (90 Base) MCG/ACT inhaler [Pharmacy Med Name: ALBUTEROL HFA (PROVENTIL) INH]  2     Sig: TAKE 2 PUFFS BY MOUTH EVERY 4 HOURS AS NEEDED FOR WHEEZE      Last office visit with prescribing clinician: 7/1/2024   Last telemedicine visit with prescribing clinician: Visit date not found   Next office visit with prescribing clinician: 10/7/2024                         Would you like a call back once the refill request has been completed: [] Yes [] No    If the office needs to give you a call back, can they leave a voicemail: [] Yes [] No    Bindu Troy MA  09/30/24, 11:37 EDT

## 2024-10-07 ENCOUNTER — OFFICE VISIT (OUTPATIENT)
Dept: PAIN MEDICINE | Facility: CLINIC | Age: 64
End: 2024-10-07
Payer: COMMERCIAL

## 2024-10-07 ENCOUNTER — PREP FOR SURGERY (OUTPATIENT)
Dept: SURGERY | Facility: SURGERY CENTER | Age: 64
End: 2024-10-07
Payer: COMMERCIAL

## 2024-10-07 ENCOUNTER — OFFICE VISIT (OUTPATIENT)
Dept: FAMILY MEDICINE CLINIC | Facility: CLINIC | Age: 64
End: 2024-10-07
Payer: COMMERCIAL

## 2024-10-07 VITALS
RESPIRATION RATE: 20 BRPM | HEART RATE: 90 BPM | WEIGHT: 184.2 LBS | OXYGEN SATURATION: 96 % | TEMPERATURE: 98.8 F | BODY MASS INDEX: 28.91 KG/M2 | DIASTOLIC BLOOD PRESSURE: 65 MMHG | SYSTOLIC BLOOD PRESSURE: 131 MMHG | HEIGHT: 67 IN

## 2024-10-07 VITALS
WEIGHT: 185.5 LBS | TEMPERATURE: 98.1 F | OXYGEN SATURATION: 95 % | SYSTOLIC BLOOD PRESSURE: 134 MMHG | BODY MASS INDEX: 29.11 KG/M2 | HEART RATE: 82 BPM | DIASTOLIC BLOOD PRESSURE: 70 MMHG | HEIGHT: 67 IN

## 2024-10-07 DIAGNOSIS — M51.362 DEGENERATION OF INTERVERTEBRAL DISC OF LUMBAR REGION WITH DISCOGENIC BACK PAIN AND LOWER EXTREMITY PAIN: ICD-10-CM

## 2024-10-07 DIAGNOSIS — E66.811 CLASS 1 OBESITY DUE TO EXCESS CALORIES WITH SERIOUS COMORBIDITY AND BODY MASS INDEX (BMI) OF 33.0 TO 33.9 IN ADULT: Primary | ICD-10-CM

## 2024-10-07 DIAGNOSIS — M54.16 LUMBAR RADICULOPATHY: Primary | ICD-10-CM

## 2024-10-07 DIAGNOSIS — R73.03 PREDIABETES: ICD-10-CM

## 2024-10-07 DIAGNOSIS — E66.09 CLASS 1 OBESITY DUE TO EXCESS CALORIES WITH SERIOUS COMORBIDITY AND BODY MASS INDEX (BMI) OF 33.0 TO 33.9 IN ADULT: Primary | ICD-10-CM

## 2024-10-07 DIAGNOSIS — E78.2 MIXED HYPERLIPIDEMIA: ICD-10-CM

## 2024-10-07 PROCEDURE — 99214 OFFICE O/P EST MOD 30 MIN: CPT

## 2024-10-07 PROCEDURE — 99214 OFFICE O/P EST MOD 30 MIN: CPT | Performed by: NURSE PRACTITIONER

## 2024-10-07 RX ORDER — ATORVASTATIN CALCIUM 20 MG/1
20 TABLET, FILM COATED ORAL DAILY
Qty: 360 TABLET | Refills: 3 | Status: SHIPPED | OUTPATIENT
Start: 2024-10-07

## 2024-10-07 RX ORDER — METFORMIN HCL 500 MG
2000 TABLET, EXTENDED RELEASE 24 HR ORAL
Qty: 360 TABLET | Refills: 3 | Status: SHIPPED | OUTPATIENT
Start: 2024-10-07

## 2024-10-07 RX ORDER — PHENTERMINE AND TOPIRAMATE 3.75; 23 MG/1; MG/1
1 CAPSULE, EXTENDED RELEASE ORAL DAILY
Qty: 30 CAPSULE | Refills: 0 | Status: SHIPPED | OUTPATIENT
Start: 2024-10-07

## 2024-10-07 NOTE — PATIENT INSTRUCTIONS
Recommended immunizations,    COVID and flu shot okay to get same-day    After 6 weeks or so Prevnar 20    Your Tdap at any time    Increase metformin to 2000 mg daily increase slowly to avoid any loose stool let me know if your new medication goes through if so set up an appointment to discuss it further,      any difficulties do not get discouraged,

## 2024-10-07 NOTE — PROGRESS NOTES
"Chief Complaint  Labs Only    Subjective        Ro Perez presents to [facility]  History of Present Illness  Very pleasant patient here today follow-up insulin resistance, prediabetes, obesity with high triglycerides and hyperlipidemia, patient is very motivated for health and she had taken Ozempic for some time and had done spectacular and she was down quite a few pounds and A1c was normal unfortunately since she has the diabetic process of insulin resistance but not severe enough to be called diabetes insurance use this to no longer cover the medication  Up to date well-respected resource recommends this for prediabetes nonetheless insurance does not have to cover this    Patient recently restarted on metformin, hopefully they will help her numbers and it likely kept it from spiking as much, but nonetheless she feels discouraged that she has picked up some weight, and her numbers do not look quite as good and she is back into the prediabetes she is quite frustrated,  She is motivated for health and she continues to try to improve her diet and to keep her health managed well,      Creatinine slightly elevated on this most recent lab but she takes no NSAIDs  Takes no chronic ibuprofen naproxen and no reason to have renal insufficiency so we will just repeat this          Objective   Vital Signs:  /70   Pulse 82   Temp 98.1 °F (36.7 °C) (Oral)   Ht 170.2 cm (67.01\")   Wt 84.1 kg (185 lb 8 oz)   SpO2 95%   BMI 29.05 kg/m²   Estimated body mass index is 29.05 kg/m² as calculated from the following:    Height as of this encounter: 170.2 cm (67.01\").    Weight as of this encounter: 84.1 kg (185 lb 8 oz).          Physical Exam  Vitals reviewed.   Constitutional:       Appearance: She is well-developed.   HENT:      Head: Normocephalic.      Nose: Nose normal.   Eyes:      General: No scleral icterus.     Conjunctiva/sclera: Conjunctivae normal.      Pupils: Pupils are equal, " round, and reactive to light.   Neck:      Thyroid: No thyromegaly.      Vascular: No JVD.   Cardiovascular:      Rate and Rhythm: Normal rate and regular rhythm.      Heart sounds: Normal heart sounds. No murmur heard.     No friction rub. No gallop.   Pulmonary:      Effort: Pulmonary effort is normal. No respiratory distress.      Breath sounds: Normal breath sounds. No stridor. No wheezing or rales.   Abdominal:      General: Bowel sounds are normal. There is no distension.      Palpations: Abdomen is soft.      Tenderness: There is no abdominal tenderness.      Comments: No hepatosplenomegaly, no ascites,   Musculoskeletal:         General: No tenderness.      Cervical back: Neck supple.   Lymphadenopathy:      Cervical: No cervical adenopathy.   Skin:     General: Skin is warm and dry.      Findings: No erythema or rash.   Neurological:      Mental Status: She is alert and oriented to person, place, and time.      Deep Tendon Reflexes: Reflexes are normal and symmetric.   Psychiatric:         Behavior: Behavior normal.         Thought Content: Thought content normal.         Judgment: Judgment normal.        Result Review :                Assessment and Plan   Diagnoses and all orders for this visit:    1. Class 1 obesity due to excess calories with serious comorbidity and body mass index (BMI) of 33.0 to 33.9 in adult (Primary)    2. Mixed hyperlipidemia    3. Prediabetes    Other orders  -     Phentermine-Topiramate (Qsymia) 3.75-23 MG capsule sustained-release 24 hr; Take 1 tablet by mouth Daily.  Dispense: 30 capsule; Refill: 0  -     metFORMIN ER (GLUCOPHAGE-XR) 500 MG 24 hr tablet; Take 4 tablets by mouth Daily With Breakfast. For prediabetes in good health  Dispense: 360 tablet; Refill: 3  -     atorvastatin (LIPITOR) 20 MG tablet; Take 1 tablet by mouth Daily. To decrease risk of stroke and heart attack  Dispense: 360 tablet; Refill: 3             Follow Up   No follow-ups on file.  Patient was given  instructions and counseling regarding her condition or for health maintenance advice. Please see specific information pulled into the AVS if appropriate.   QSYMIA  Risk-benefit controlled nature of phentermine she has no contraindications no history of heart problems,  No history of addiction or strong family history,  Potential risk benefit may including potential cognitive dysfunction possibly permanent cognitive electrolyte abnormalities,  Controlled substance agreement requested,  She has not actually to start this we will try to get prior approval  Insurance will deny it we will have to do a PA.  She will need to return to office appropriate monitoring  , Appropriate screening      Patient Instructions   Recommended immunizations,    COVID and flu shot okay to get same-day    After 6 weeks or so Prevnar 20    Your Tdap at any time    Increase metformin to 2000 mg daily increase slowly to avoid any loose stool let me know if your new medication goes through if so set up an appointment to discuss it further,      any difficulties do not get discouraged,                 Answers submitted by the patient for this visit:  Other (Submitted on 9/30/2024)  Please describe your symptoms.: Follow up from July 1st appointment.  Have you had these symptoms before?: Yes  How long have you been having these symptoms?: Greater than 2 weeks  Primary Reason for Visit (Submitted on 9/30/2024)  What is the primary reason for your visit?: Problem Not Listed

## 2024-10-07 NOTE — PROGRESS NOTES
CHIEF COMPLAINT  Back pain    Subjective   Ro Perez is a 64 y.o. female  who presents for follow-up. She has a history of chronic low back pain. She reports that her pain has remained consistent since her last office visit. She would like to discuss scheduling another epidural injection.     Today pain is 5/10VAS in severity (severity in pain varies based on activity level). Pain is located in her low back (left side worse than right) and radiates into left hip/anterior thigh and down lateral leg into ankle. Pain has improved since her procedure. Describes this pain as an intermittent aching, burning, and shooting. Pain is worsened by walking long distances, prolonged sitting or standing, rising from sitting to standing position, and bending/twisting. Pain improve with rest/reposition, heat, and OTC Aleve/Ibuprofen PRN. Mixed results with PT in the past.     She was prescribed Gabapentin 300mg at her last office visit. She reports that this has helped improve numbness in her left leg and allows her to rest better at night. She reports intermittent episodes of light-headedness.      Patient works at [employer].     Procedures:  8/6/24 - Left L3 & L4 TF LESI - 50% ongoing relief  6/12/24 - L3-L4 LESI - 20% ongoing relief   2/20/24 - L3-L4 LESI - 100% x 2.5 months  11/1/23 - L3-L4 LESI - 100% relief x 2.5 months    Back Pain  This is a chronic problem. The current episode started more than 1 year ago. The problem occurs intermittently. The problem has been comes and goes since onset. The pain is present in the lumbar spine. The quality of the pain is described as shooting, aching and burning. The pain does not radiate (left anterior thigh and left lateral leg into ankle). The pain is at a severity of 5/10 (severity in pain varies based on activity level). The pain is moderate. The pain is The same all the time. The symptoms are aggravated by position, sitting and standing (walking long distances,). Pertinent negatives  "include no abdominal pain, chest pain, dysuria, fever, headaches, numbness or weakness. She has tried chiropractic manipulation, heat and NSAIDs (PT,) for the symptoms. The treatment provided mild relief.     PEG Assessment   What number best describes your pain on average in the past week?7  What number best describes how, during the past week, pain has interfered with your enjoyment of life?0  What number best describes how, during the past week, pain has interfered with your general activity?  0    Review of Pertinent Medical Data ---      The following portions of the patient's history were reviewed and updated as appropriate: allergies, current medications, past family history, past medical history, past social history, past surgical history, and problem list.    Review of Systems   Constitutional:  Negative for activity change (less), fatigue and fever.   Respiratory:  Negative for cough and chest tightness.    Cardiovascular:  Negative for chest pain.   Gastrointestinal:  Negative for abdominal pain, constipation and diarrhea.   Genitourinary:  Negative for dysuria.   Musculoskeletal:  Positive for back pain.   Neurological:  Negative for dizziness, weakness, light-headedness, numbness and headaches.   Psychiatric/Behavioral:  Negative for agitation, sleep disturbance and suicidal ideas. The patient is not nervous/anxious.      I have reviewed and confirmed the accuracy of the ROS as documented by the MA/LPN/RN VINH Phillips    Vitals:    10/07/24 0825   BP: 131/65   BP Location: Left arm   Patient Position: Sitting   Cuff Size: Large Adult   Pulse: 90   Resp: 20   Temp: 98.8 °F (37.1 °C)   TempSrc: Oral   SpO2: 96%   Weight: 83.6 kg (184 lb 3.2 oz)   Height: 170.2 cm (67\")   PainSc:   5     Objective   Physical Exam  Constitutional:       Appearance: Normal appearance.   HENT:      Head: Normocephalic.   Cardiovascular:      Rate and Rhythm: Normal rate and regular rhythm.   Pulmonary:      Effort: " Pulmonary effort is normal.      Breath sounds: Normal breath sounds.   Musculoskeletal:      Cervical back: Normal range of motion.      Lumbar back: Tenderness present. Positive left straight leg raise test. Negative right straight leg raise test.      Left hip: Tenderness present.      Comments: + lumbar facet loading/tenderness   Skin:     General: Skin is warm and dry.      Capillary Refill: Capillary refill takes less than 2 seconds.   Neurological:      General: No focal deficit present.      Mental Status: She is alert and oriented to person, place, and time.   Psychiatric:         Mood and Affect: Mood normal.         Behavior: Behavior normal.         Thought Content: Thought content normal.         Cognition and Memory: Cognition normal.       Assessment & Plan   Diagnoses and all orders for this visit:    1. Lumbar radiculopathy (Primary)    2. Degeneration of intervertebral disc of lumbar region with discogenic back pain and lower extremity pain      Ro Perez reports a pain score of 5.  Given her pain assessment as noted, treatment options were discussed and the following options were decided upon as a follow-up plan to address the patient's pain: continuation of current treatment plan for pain and steroid injections.    --- Left L3 & L4 TF LESI - to be completed on/after 11/6/24  ---  Indications for epidural injection:  Plan is to proceed with epidural at the appropriate level.  If the patient receives significant pain reduction and improvement in function and the plan will be to repeat the epidural when the pain worsens.  If a second epidural provides at least 6 weeks of sustained improvement that includes both pain reduction and improvement in function then an epidural injection could be repeated once again at the same level.  This is a mutual decision between the clinician and the patient that includes discussions including risks and benefits in detail as well as alternative therapies.  Patient's  questions were answered to their satisfaction and to their understanding.  ---   --- Continue Gabapentin. No refill needed at this time.   --- Previous discussion on lumbar MBB/RFA if axial back pain were to worsen. Patient wishes to hold on procedure at this time.   --- Follow-up for procedure      MANJULA REPORT  As part of the patient's treatment plan, I am prescribing controlled substances. The patient has been made aware of appropriate use of such medications, including potential risk of somnolence, limited ability to drive and/or work safely, and the potential for dependence or overdose. It has also been made clear that these medications are for use by this patient only, without concomitant use of alcohol or other substances unless prescribed.     Patient has completed prescribing agreement detailing terms of continued prescribing of controlled substances, including monitoring MANJULA reports, urine drug screening, and pill counts if necessary. The patient is aware that inappropriate use will results in cessation of prescribing such medications.    As the clinician, I personally reviewed the MANJULA from 10/7/24 while the patient was in the office today.    History and physical exam exhibit continued safe and appropriate use of controlled substances.    Dictated utilizing Dragon dictation.

## 2024-10-11 ENCOUNTER — TRANSCRIBE ORDERS (OUTPATIENT)
Dept: SURGERY | Facility: SURGERY CENTER | Age: 64
End: 2024-10-11
Payer: COMMERCIAL

## 2024-10-11 DIAGNOSIS — Z41.9 SURGERY, ELECTIVE: Primary | ICD-10-CM

## 2024-10-11 PROBLEM — M51.362 DEGENERATION OF INTERVERTEBRAL DISC OF LUMBAR REGION WITH DISCOGENIC BACK PAIN AND LOWER EXTREMITY PAIN: Status: ACTIVE | Noted: 2024-10-07

## 2024-11-11 ENCOUNTER — OFFICE VISIT (OUTPATIENT)
Dept: FAMILY MEDICINE CLINIC | Facility: CLINIC | Age: 64
End: 2024-11-11
Payer: COMMERCIAL

## 2024-11-11 VITALS
TEMPERATURE: 97 F | HEART RATE: 98 BPM | DIASTOLIC BLOOD PRESSURE: 72 MMHG | SYSTOLIC BLOOD PRESSURE: 136 MMHG | WEIGHT: 182 LBS | OXYGEN SATURATION: 92 % | RESPIRATION RATE: 14 BRPM | BODY MASS INDEX: 28.56 KG/M2 | HEIGHT: 67 IN

## 2024-11-11 DIAGNOSIS — R73.03 PREDIABETES: Primary | ICD-10-CM

## 2024-11-11 DIAGNOSIS — E66.3 OVERWEIGHT (BMI 25.0-29.9): ICD-10-CM

## 2024-11-11 PROCEDURE — 99213 OFFICE O/P EST LOW 20 MIN: CPT | Performed by: NURSE PRACTITIONER

## 2024-11-11 RX ORDER — METFORMIN HYDROCHLORIDE 500 MG/1
2000 TABLET, EXTENDED RELEASE ORAL
Qty: 360 TABLET | Refills: 3 | Status: SHIPPED | OUTPATIENT
Start: 2024-11-11

## 2024-11-11 RX ORDER — PHENTERMINE AND TOPIRAMATE 7.5; 46 MG/1; MG/1
1 CAPSULE, EXTENDED RELEASE ORAL DAILY
Qty: 30 CAPSULE | Refills: 2 | Status: SHIPPED | OUTPATIENT
Start: 2024-11-11

## 2024-11-11 NOTE — PATIENT INSTRUCTIONS
Increase qsymia as directed, continue present plan,  Update me next month, if no change let me know,      Yes he goes, do not get discouraged to marathon but let me know if there is any difficulties,

## 2024-11-20 ENCOUNTER — APPOINTMENT (OUTPATIENT)
Dept: GENERAL RADIOLOGY | Facility: SURGERY CENTER | Age: 64
End: 2024-11-20
Payer: COMMERCIAL

## 2024-11-20 ENCOUNTER — HOSPITAL ENCOUNTER (OUTPATIENT)
Facility: SURGERY CENTER | Age: 64
Setting detail: HOSPITAL OUTPATIENT SURGERY
Discharge: HOME | End: 2024-11-20
Attending: ANESTHESIOLOGY | Admitting: ANESTHESIOLOGY
Payer: COMMERCIAL

## 2024-11-20 ENCOUNTER — HOSPITAL ENCOUNTER (OUTPATIENT)
Dept: GENERAL RADIOLOGY | Facility: SURGERY CENTER | Age: 64
Setting detail: HOSPITAL OUTPATIENT SURGERY
End: 2024-11-20
Payer: COMMERCIAL

## 2024-11-20 VITALS
BODY MASS INDEX: 27.94 KG/M2 | HEART RATE: 75 BPM | HEIGHT: 67 IN | DIASTOLIC BLOOD PRESSURE: 59 MMHG | WEIGHT: 178 LBS | OXYGEN SATURATION: 94 % | SYSTOLIC BLOOD PRESSURE: 120 MMHG | TEMPERATURE: 98 F | RESPIRATION RATE: 16 BRPM

## 2024-11-20 DIAGNOSIS — M54.16 LUMBAR RADICULOPATHY: ICD-10-CM

## 2024-11-20 DIAGNOSIS — M51.362 DEGENERATION OF INTERVERTEBRAL DISC OF LUMBAR REGION WITH DISCOGENIC BACK PAIN AND LOWER EXTREMITY PAIN: ICD-10-CM

## 2024-11-20 DIAGNOSIS — Z41.9 SURGERY, ELECTIVE: ICD-10-CM

## 2024-11-20 LAB — GLUCOSE BLDC GLUCOMTR-MCNC: 88 MG/DL (ref 70–130)

## 2024-11-20 PROCEDURE — 25510000001 IOPAMIDOL 61 % SOLUTION 30 ML VIAL: Performed by: ANESTHESIOLOGY

## 2024-11-20 PROCEDURE — S0260 H&P FOR SURGERY: HCPCS | Performed by: ANESTHESIOLOGY

## 2024-11-20 PROCEDURE — 64484 NJX AA&/STRD TFRM EPI L/S EA: CPT | Performed by: ANESTHESIOLOGY

## 2024-11-20 PROCEDURE — 64483 NJX AA&/STRD TFRM EPI L/S 1: CPT | Performed by: ANESTHESIOLOGY

## 2024-11-20 PROCEDURE — 25010000002 METHYLPREDNISOLONE PER 80 MG: Performed by: ANESTHESIOLOGY

## 2024-11-20 PROCEDURE — 25010000002 LIDOCAINE PF 1% 1 % SOLUTION 5 ML VIAL: Performed by: ANESTHESIOLOGY

## 2024-11-20 PROCEDURE — 25010000002 BUPIVACAINE (PF) 0.5 % SOLUTION 10 ML VIAL: Performed by: ANESTHESIOLOGY

## 2024-11-20 PROCEDURE — 77002 NEEDLE LOCALIZATION BY XRAY: CPT

## 2024-11-20 PROCEDURE — 76000 FLUOROSCOPY <1 HR PHYS/QHP: CPT

## 2024-11-20 NOTE — OP NOTE
Called pt and left msg for pt to call the office regarding results   Lumbar Transforaminal Epidural Steroid Injection (two levels)  [facility]      PREOPERATIVE DIAGNOSIS:  left Lumbar Radiculopathy    POSTOPERATIVE DIAGNOSIS:  Same as preop diagnosis    PROCEDURE:    1. CPT 62985 --  Diagnostic Transforaminal Epidural Steroid Injection at the L3 level, on the left   2. CPT 24263 --  Diagnostic Transforaminal Epidural Steroid Injection at the L4 level, on the left     PRE-PROCEDURE DISCUSSION WITH PATIENT:    Risks and complications were discussed with the patient prior to starting the procedure and informed consent was obtained.  We discussed various topics including but not limited to bleeding, infection, injury, nerve injury, paralysis, coma, death, postprocedural painful flare-up, postprocedural site soreness, and a lack of pain relief.  We discussed the diagnostic aspect of transforaminal epidural / selective nerve root blockade.    SURGEON:  Raul Moreira MD    REASON FOR PROCEDURE:    Degenerative changes are noted in the area., Stenotic area is noted, and is likely contributing to this chronic &/or recurrent pain. , and Radiating pattern of pain is likely consistent with degenerative changes in the area.    SEDATION:  Patient declined administration of moderate sedation    ANESTHETIC:  Marcaine 0.25%  STEROID:  Methylprednisolone (DEPO MEDROL) 80mg/ml    DESCRIPTON OF PROCEDURE:  After obtaining informed consent, an I.V. was not started in the preoperative area. The patient taken to the operating room and was placed in the prone position with a pillow under the abdomen.  All pressure points were well padded.  EKG, blood pressure, and pulse oximeter were monitored.  The lumbar area was prepped with Chloraprep and draped in a sterile fashion. Under fluoroscopic guidance in an oblique dimension on the above mentioned side, the transverse process of the first aforementioned vertebra at the junction of the body at 6 o'clock position was identified. Skin and  subcutaneous tissue was anesthetized with 1% lidocaine. A 22-gauge spinal needle was introduced under fluoroscopic guidance at the above junction into the foramen without parasthesias and into the epidural space. After confirming the position of the needle with PA, lateral, and oblique fluoroscopic views, aspiration was checked and was clear of blood or CSF.  Next, 1 mL of Omnipaque was injected. After seeing adequate spread on the corresponding nerve root, a total volume 2mL of injectate containing local anesthetic as above and half of the above mentioned corticosteroid was injected into the epidural space.  The needle was removed intact.      Next, in similar fashion, the second level mentioned above was addressed and a similar amount of injectate was delivered after similar imaging was achieved.      Vital signs were stable throughout.          ESTIMATED BLOOD LOSS:  <5 mL  SPECIMENS:  none    COMPLICATIONS:   No complications were noted., There was no indication of vascular uptake on live injection of contrast dye., There was no indication of intrathecal uptake on live injection of contrast dye., There was not any evidence of dural puncture.  , and The patient did not have any signs of postprocedure numbness nor weakness.    TOLERANCE & DISCHARGE CONDITION:    The patient tolerated the procedure well.  The patient was transported to the recovery area without difficulties.  The patient was discharged to home under the care of family in stable and satisfactory condition.    PLAN OF CARE:  The patient was given our standard instruction sheet.  The patient will Return to clinic in 8 wks.  The patient will resume all medications as per the medication reconciliation sheet.

## 2024-11-20 NOTE — H&P
Brief Pre-procedural / Pre-operative H&P        -----    Pertinent Diagnosis:   Radiculopathy on the left lumbar radiculopathy on the left    Proposed Procedure: Lumbar transforaminal epidural steroid injection, anticipated on the left at L3 and L4      Gisele Perez is a 64 y.o. female  who presents for intervention.  She has a history of back pain and left lower extremity pain.      History of Present Illness     She has had lumbar transforaminal injections at the aforementioned levels on multiple occasions with significant relief.  Her pain of the low back left side and radiating into the lateral and anterior thigh down the lateral leg to the ankle had improved significantly with previous procedure but now is starting to return.  With the aching and burning and shooting, that is intermittent, becoming more frequent recrudescence of the pain is a sign that repeat injection is reasonable to consider with her previous significant relief it is reasonably therapeutically indicated.  She is using this as part of multimodal therapy that has also included use of gabapentin.    -------    The following portions of the patient's history were reviewed and updated as appropriate: allergies, current medications, past family history, past medical history, past social history, past surgical history and problem list.    Allergies   Allergen Reactions    Codeine Hives    Sulfa Antibiotics Rash       No current facility-administered medications for this encounter.    No current facility-administered medications on file prior to encounter.     Current Outpatient Medications on File Prior to Encounter   Medication Sig Dispense Refill    atorvastatin (LIPITOR) 20 MG tablet Take 1 tablet by mouth Daily. To decrease risk of stroke and heart attack 360 tablet 3    DULoxetine (CYMBALTA) 60 MG capsule Take 1 capsule by mouth 2 (Two) Times a Day. 180 capsule 3    fenofibrate (TRICOR) 145 MG tablet Take 0.5 tablets by mouth Daily.  45 tablet 3    mirtazapine (REMERON) 15 MG tablet Take 1 tablet by mouth every night at bedtime. 90 tablet 3    Multiple Vitamins-Minerals (CENTRUM SILVER 50+WOMEN PO) Take 1 tablet by mouth Daily.      albuterol sulfate  (90 Base) MCG/ACT inhaler TAKE 2 PUFFS BY MOUTH EVERY 4 HOURS AS NEEDED FOR WHEEZE 6.7 g 2    ALPRAZolam (Xanax) 0.5 MG tablet One half to one tablet daily as needed for anxiety, caution sedation do not drive. 45 tablet 0    gabapentin (NEURONTIN) 300 MG capsule Take 1 capsule by mouth at night for 5-7 days. If tolerating well, may increase to 1 capsule twice a day for 5-7 days with goal of taking medication three times per day 90 capsule 0         Degeneration of intervertebral disc of lumbar region with discogenic back pain and lower extremity pain    Lumbar radiculopathy       Past Medical History:   Diagnosis Date    Abnormal finding on mammography 2016    Anemia     Anxiety     Benign breast lumps     Bloating     Bursitis of shoulder, right 2019    Cervical disc disorder     Chronic left-sided low back pain without sciatica     Chronic right shoulder pain     Colon polyps     FOLLOWED BY DR. CATE Blum     DIAZ (dyspnea on exertion) 2018    Extremity pain     Fatigue     Fractures 2014    Left Ankle    Headache     Headache, tension-type     Health maintenance examination 2017    High risk medication use 2018    Hyperlipidemia     MIXED    Insomnia     Joint pain     Knees    Kidney stones     Lumbar radiculopathy 2023    Migraine     OA (osteoarthritis)     Osteopenia     Postmenopausal     Skin tag of anus     Urgency incontinence 2016    Vitamin D deficiency     Weakness of shoulder     RIGHT SHOULDER    Weight gain        Past Surgical History:   Procedure Laterality Date     SECTION N/A 1997     SECTION N/A 2000    COLONOSCOPY N/A 2020    3 CM LESION IN PERIANAL-SUSPECTED CONDYLOMA,  MULTIPLE MEDIUM DIVERTICULA IN SIGMOID, 3 HYPERPLASTIC POLYPS IN RECTUM, MEDIUM INTERNAL HEMORRHOIDS, DR. CATE LANG AT [facility]      EPIDURAL Left 2024    Procedure: LEFT L4 & L5 TRANSFORAMINAL LUMABR EPIDURAL STEROID INJECTION CPT: 49051, 37688;  Surgeon: Raul Moreira MD;  [place] [facility];  Service: Pain Management;  Laterality: Left;    EPIDURAL BLOCK      EYE SURGERY  2005    Lasik    HEMORRHOIDECTOMY N/A 2020    Procedure: RIGHT POSTERIOR ANAL TAG EXCISION;  Surgeon: Tessa Ball MD;  Location: [facility] MAIN OR;  Service: General;  Laterality: N/A;    LASER ABLATION      LASIK Bilateral     LUMBAR EPIDURAL INJECTION N/A 2023    Procedure: L3-L4 LUMBAR EPIDURAL STEROID INJECTION CPT:84487;  Surgeon: Raul Moreira MD;  [place] [facility] [place];  Service: Pain Management;  Laterality: N/A;    LUMBAR EPIDURAL INJECTION N/A 2024    Procedure: L3-L4 LUMBAR EPIDURAL STEROID INJECTION CPT: 22989;  Surgeon: Raul Moreira MD;  Location: [facility];  Service: Pain Management;  Laterality: N/A;    LUMBAR EPIDURAL INJECTION N/A 2024    Procedure: L3/L4 LUMBAR EPIDURAL STEROID INJECTION CPT: 77135;  Surgeon: Raul Moreira MD;  [place] [facility];  Service: Pain Management;  Laterality: N/A;       Family History   Problem Relation Age of Onset    Osteoporosis Mother     Cancer Mother          2023    Colon polyps Mother     Breast cancer Mother     Stroke Mother          2023    Arthritis Mother     Osteoporosis Maternal Grandmother     No Known Problems Father     No Known Problems Sister     No Known Problems Daughter     No Known Problems Daughter     Malig Hyperthermia Neg Hx        Social History     Socioeconomic History    Marital status:    Tobacco Use    Smoking status: Every Day     Current packs/day: 0.50     Average packs/day: 0.5 packs/day for 20.0 years (10.0 ttl pk-yrs)     Types: Cigarettes     Passive  "exposure: Never    Smokeless tobacco: Never    Tobacco comments:     last smoke 9/16/20   Vaping Use    Vaping status: Never Used   Substance and Sexual Activity    Alcohol use: Never    Drug use: Never    Sexual activity: Not Currently     Partners: Male     Birth control/protection: Post-menopausal     Comment: .       -------       Review of Systems  No Fever, No Chills, No ear pain, No sinus pressure or drainage, No eye pain or drainage, No cough, No SOB, No chest tightness nor chest pain, no palpitations.          Vitals:    11/20/24 1328   BP: 131/59   BP Location: Left arm   Patient Position: Sitting   Pulse: 80   Resp: 16   Temp: 97.8 °F (36.6 °C)   TempSrc: Temporal   SpO2: 93%   Weight: 80.7 kg (178 lb)   Height: 170.2 cm (67\")         Objective   Physical Exam  VSS, NNR, NCAT, NMNA, NRD, AAOx3.    Straight leg raising positivity on the left  -------    Assessment & Plan:  - as noted above, the stated intervention is indicated  - Follow-up plan will be noted in the operative report        She has a follow-up in a couple months      EMR Dragon/Transcription disclaimer:   Typed items in this encounter note may have been created by electronic transcription/translation software which converts spoken language to printed text. The electronic translation of spoken language may permit erroneous, or at times, nonsensical words or phrases to be inadvertently transcribed; Although I have reviewed the note for such errors, some may still exist.      "

## 2024-11-20 NOTE — DISCHARGE INSTRUCTIONS
[facility] - Post-procedure Instructions          --  While there are no absolute restrictions, it is recommended that you do not perform strenuous activity today. In the morning, you may resume your level of activity as before your block.    --  If you have a band-aid at your injection site, please remove it later today. Observe the area for any redness, swelling, pus-like drainage, or a temperature over 101°. If any of these symptoms occur, please call your doctor at [phone]. If after office hours, leave a message and the on-call provider will return your call.    --  Ice may be applied to your injection site. It is recommended you avoid direct heat (heating pad; hot tub) for 1-2 days.    --  Call [facility] at [phone] if you experience persistent headache, persistent bleeding from the injection site, or severe pain not relieved by heat or oral medication.    --  Do not make important decisions today.    --  Due to the effects of the block and/or the I.V. Sedation, DO NOT drive or operate hazardous machinery for 12 hours.  Local anesthetics may cause numbness after procedure and precautions must be taken with regards to operating equipment as well as with walking, even if ambulating with assistance of another person or with an assistive device.    --  Do not drink alcohol for 12 hours.    -- You may return to work tomorrow, or as directed by your referring doctor.    --  Occasionally you may notice a slight increase in your pain after the procedure. This should start to improve within the next 24-48 hours. Radiofrequency ablation procedure pain may last 3-4 weeks.    --  It may take as long as 3-4 days before you notice a gradual improvement in your pain and/or other symptoms.    -- You may continue to take your prescribed pain medication as needed.    --  Some normal possible side effects of steroid use could include fluid retention, increased blood sugar, dull headache,  increased sweating, increased appetite, mood swings and flushing.    --  Diabetics are recommended to watch their blood glucose level closely for 24-48 hours after the injection.    --  Must stay in PACU for 20 min upon arrival and prove no leg weakness before being discharged.    --  IN THE EVENT OF A LIFE THREATENING EMERGENCY, (CHEST PAIN, BREATHING DIFFICULTIES, PARALYSIS…) YOU SHOULD GO TO YOUR NEAREST EMERGENCY ROOM.    --  You should be contacted by our office within 2-3 days to schedule follow up or next appointment date.  If not contacted within 7 days, please call the office at [phone]

## 2024-12-16 ENCOUNTER — HOSPITAL ENCOUNTER (OUTPATIENT)
Dept: BONE DENSITY | Facility: HOSPITAL | Age: 64
Discharge: HOME | End: 2024-12-16
Admitting: NURSE PRACTITIONER
Payer: COMMERCIAL

## 2024-12-16 DIAGNOSIS — Z78.0 POSTMENOPAUSAL: ICD-10-CM

## 2024-12-16 PROCEDURE — 77080 DXA BONE DENSITY AXIAL: CPT

## 2025-01-13 ENCOUNTER — PREP FOR SURGERY (OUTPATIENT)
Dept: SURGERY | Facility: SURGERY CENTER | Age: 65
End: 2025-01-13
Payer: COMMERCIAL

## 2025-01-13 ENCOUNTER — TRANSCRIBE ORDERS (OUTPATIENT)
Dept: SURGERY | Facility: SURGERY CENTER | Age: 65
End: 2025-01-13
Payer: COMMERCIAL

## 2025-01-13 ENCOUNTER — OFFICE VISIT (OUTPATIENT)
Dept: PAIN MEDICINE | Facility: CLINIC | Age: 65
End: 2025-01-13
Payer: COMMERCIAL

## 2025-01-13 VITALS
DIASTOLIC BLOOD PRESSURE: 84 MMHG | OXYGEN SATURATION: 95 % | TEMPERATURE: 96.2 F | WEIGHT: 174.8 LBS | HEIGHT: 67 IN | SYSTOLIC BLOOD PRESSURE: 132 MMHG | BODY MASS INDEX: 27.44 KG/M2 | HEART RATE: 100 BPM

## 2025-01-13 DIAGNOSIS — M54.16 LUMBAR RADICULOPATHY: ICD-10-CM

## 2025-01-13 DIAGNOSIS — M51.362 DEGENERATION OF INTERVERTEBRAL DISC OF LUMBAR REGION WITH DISCOGENIC BACK PAIN AND LOWER EXTREMITY PAIN: ICD-10-CM

## 2025-01-13 DIAGNOSIS — G89.29 CHRONIC LOW BACK PAIN, UNSPECIFIED BACK PAIN LATERALITY, UNSPECIFIED WHETHER SCIATICA PRESENT: Primary | ICD-10-CM

## 2025-01-13 DIAGNOSIS — Z41.9 SURGERY, ELECTIVE: Primary | ICD-10-CM

## 2025-01-13 DIAGNOSIS — M54.50 CHRONIC LOW BACK PAIN, UNSPECIFIED BACK PAIN LATERALITY, UNSPECIFIED WHETHER SCIATICA PRESENT: Primary | ICD-10-CM

## 2025-01-13 DIAGNOSIS — M54.16 LUMBAR RADICULOPATHY: Primary | ICD-10-CM

## 2025-01-13 PROCEDURE — 99214 OFFICE O/P EST MOD 30 MIN: CPT

## 2025-01-13 RX ORDER — PREGABALIN 75 MG/1
CAPSULE ORAL
Qty: 60 CAPSULE | Refills: 0 | Status: SHIPPED | OUTPATIENT
Start: 2025-01-13

## 2025-01-13 NOTE — H&P (VIEW-ONLY)
CHIEF COMPLAINT  Back pain     Subjective   Ro Perez is a 64 y.o. female  who presents to the office for follow-up of procedure.  She completed a Left L3 & L4 TFLESI on 11/20/24 performed by Dr. Moreira for management of back pain. Patient reports 85% ongoing relief from this procedure. She reports that her pain is gradually starting to return.     Today pain is 4/10VAS in severity (severity in pain varies based on activity level). Pain is located in her low back (left side worse than right) and radiates into left hip/anterior thigh and down lateral leg into ankle. New complaint of right anterior thigh pain that terminates just below the knee. Describes this pain as an intermittent aching, burning, and shooting. Pain is worsened by walking long distances, prolonged sitting or standing, rising from sitting to standing position, and bending/twisting. Pain improve with rest/reposition, heat, and OTC Aleve/Ibuprofen PRN. Mixed results with PT in the past.      She has discontinued Gabapentin since her last office visit due to dizziness.      Patient works at [employer].     Procedures:  11/20/24 - 85% ongoing relief - pain is gradually starting to return  8/6/24 - Left L3 & L4 TF LESI - 50% relief x 3 months  6/12/24 - L3-L4 LESI - 20% ongoing relief   2/20/24 - L3-L4 LESI - 100% x 2.5 months  11/1/23 - L3-L4 LESI - 100% relief x 2.5 months    Back Pain  This is a chronic problem. The current episode started more than 1 year ago. The problem occurs intermittently. The problem has been comes and goes since onset. The pain is present in the lumbar spine. The quality of the pain is described as shooting, aching and burning. The pain does not radiate (left anterior thigh and left lateral leg into ankle). The pain is at a severity of 5/10 (severity in pain varies based on activity level). The pain is moderate. The pain is The same all the time. The symptoms are aggravated by position, sitting and standing (walking long  "distances,). Associated symptoms include numbness (left outer leg). Pertinent negatives include no abdominal pain, chest pain, dysuria, fever, headaches or weakness. She has tried chiropractic manipulation, heat and NSAIDs (PT,) for the symptoms. The treatment provided mild relief.      PEG Assessment   What number best describes your pain on average in the past week?4  What number best describes how, during the past week, pain has interfered with your enjoyment of life?0  What number best describes how, during the past week, pain has interfered with your general activity?  0    Review of Pertinent Medical Data ---      The following portions of the patient's history were reviewed and updated as appropriate: allergies, current medications, past family history, past medical history, past social history, past surgical history, and problem list.    Review of Systems   Constitutional:  Negative for chills and fever.   Respiratory:  Negative for cough and shortness of breath.    Cardiovascular:  Negative for chest pain.   Gastrointestinal:  Negative for abdominal pain, constipation and diarrhea.   Genitourinary:  Negative for difficulty urinating and dysuria.   Musculoskeletal:  Positive for back pain.   Neurological:  Positive for numbness (left outer leg). Negative for dizziness, weakness, light-headedness and headaches.   Psychiatric/Behavioral:  Negative for agitation.      I have reviewed and confirmed the accuracy of the ROS as documented by the MA/ALMAN/RN VINH Phillips     Vitals:    01/13/25 1036   BP: 132/84   BP Location: Left arm   Patient Position: Sitting   Pulse: 100   Temp: 96.2 °F (35.7 °C)   TempSrc: Temporal   SpO2: 95%   Weight: 79.3 kg (174 lb 12.8 oz)   Height: 170.2 cm (67\")   PainSc:   4   PainLoc: Back     Objective   Physical Exam  Constitutional:       Appearance: Normal appearance.   HENT:      Head: Normocephalic.   Cardiovascular:      Rate and Rhythm: Normal rate and regular rhythm. "   Pulmonary:      Effort: Pulmonary effort is normal.      Breath sounds: Normal breath sounds.   Musculoskeletal:      Cervical back: Normal range of motion.      Lumbar back: Tenderness present. Decreased range of motion. Positive right straight leg raise test and positive left straight leg raise test.      Left hip: Tenderness present.      Comments: + lumbar facet loading/tenderness   Skin:     General: Skin is warm and dry.      Capillary Refill: Capillary refill takes less than 2 seconds.   Neurological:      General: No focal deficit present.      Mental Status: She is alert and oriented to person, place, and time.   Psychiatric:         Mood and Affect: Mood normal.         Behavior: Behavior normal.         Thought Content: Thought content normal.         Cognition and Memory: Cognition normal.       Assessment & Plan   Diagnoses and all orders for this visit:    1. Lumbar radiculopathy (Primary)  -     pregabalin (LYRICA) 75 MG capsule; Take 1 capsule by mouth at night for 7 days. If tolerating well, may increase to 1 capsule twice day.  Dispense: 60 capsule; Refill: 0  -     MRI Lumbar Spine Without Contrast; Future    2. Degeneration of intervertebral disc of lumbar region with discogenic back pain and lower extremity pain      Ro Perez reports a pain score of 4.  Given her pain assessment as noted, treatment options were discussed and the following options were decided upon as a follow-up plan to address the patient's pain: continuation of current treatment plan for pain, steroid injections, and update lumbar MRI .    --- Bilateral L4 TF LESI   ---  Indications for epidural injection:  Plan is to proceed with epidural at the appropriate level.  If the patient receives significant pain reduction and improvement in function and the plan will be to repeat the epidural when the pain worsens.  If a second epidural provides at least 6 weeks of sustained improvement that includes both pain reduction and  improvement in function then an epidural injection could be repeated once again at the same level.  This is a mutual decision between the clinician and the patient that includes discussions including risks and benefits in detail as well as alternative therapies.  Patient's questions were answered to their satisfaction and to their understanding.  ---   --- Update lumbar MRI due to new complaint of right leg radiculopathy   --- Trial Pregabalin 75mg BID. Discussed medication with the patient.  Included in this discussion was the potential for side effects and adverse events.  Patient verbalized understanding and wished to proceed.  Prescription will be sent to pharmacy.  --- Follow-up for procedure      MANJULA REPORT  As part of the patient's treatment plan, I am prescribing controlled substances. The patient has been made aware of appropriate use of such medications, including potential risk of somnolence, limited ability to drive and/or work safely, and the potential for dependence or overdose. It has also been made clear that these medications are for use by this patient only, without concomitant use of alcohol or other substances unless prescribed.     Patient has completed prescribing agreement detailing terms of continued prescribing of controlled substances, including monitoring MANJULA reports, urine drug screening, and pill counts if necessary. The patient is aware that inappropriate use will results in cessation of prescribing such medications.    As the clinician, I personally reviewed the MANJULA from 1/13/25 while the patient was in the office today.    History and physical exam exhibit continued safe and appropriate use of controlled substances.    Dictated utilizing Dragon dictation.

## 2025-01-16 RX ORDER — PHENTERMINE AND TOPIRAMATE 7.5; 46 MG/1; MG/1
1 CAPSULE, EXTENDED RELEASE ORAL DAILY
Qty: 30 CAPSULE | Refills: 0 | Status: SHIPPED | OUTPATIENT
Start: 2025-01-16

## 2025-01-16 NOTE — TELEPHONE ENCOUNTER
Rx Refill Note  Requested Prescriptions     Pending Prescriptions Disp Refills    Phentermine-Topiramate (Qsymia) 7.5-46 MG capsule sustained-release 24 hr 30 capsule 2     Sig: Take 1 each by mouth Daily.      Last office visit with prescribing clinician: 11/11/2024   Last telemedicine visit with prescribing clinician: Visit date not found   Next office visit with prescribing clinician: 2/10/2025                         Would you like a call back once the refill request has been completed: [] Yes [] No    If the office needs to give you a call back, can they leave a voicemail: [] Yes [] No    Meryl Lucio MA  01/16/25, 11:45 EST

## 2025-02-04 ENCOUNTER — HOSPITAL ENCOUNTER (OUTPATIENT)
Facility: SURGERY CENTER | Age: 65
Setting detail: HOSPITAL OUTPATIENT SURGERY
Discharge: HOME | End: 2025-02-04
Attending: ANESTHESIOLOGY | Admitting: ANESTHESIOLOGY
Payer: COMMERCIAL

## 2025-02-04 ENCOUNTER — HOSPITAL ENCOUNTER (OUTPATIENT)
Dept: GENERAL RADIOLOGY | Facility: SURGERY CENTER | Age: 65
Setting detail: HOSPITAL OUTPATIENT SURGERY
End: 2025-02-04
Payer: COMMERCIAL

## 2025-02-04 VITALS
DIASTOLIC BLOOD PRESSURE: 86 MMHG | RESPIRATION RATE: 16 BRPM | TEMPERATURE: 98.2 F | HEART RATE: 80 BPM | BODY MASS INDEX: 27.31 KG/M2 | HEIGHT: 67 IN | SYSTOLIC BLOOD PRESSURE: 138 MMHG | WEIGHT: 174 LBS | OXYGEN SATURATION: 96 %

## 2025-02-04 DIAGNOSIS — M54.16 LUMBAR RADICULOPATHY: ICD-10-CM

## 2025-02-04 DIAGNOSIS — M54.50 CHRONIC LOW BACK PAIN, UNSPECIFIED BACK PAIN LATERALITY, UNSPECIFIED WHETHER SCIATICA PRESENT: ICD-10-CM

## 2025-02-04 DIAGNOSIS — G89.29 CHRONIC LOW BACK PAIN, UNSPECIFIED BACK PAIN LATERALITY, UNSPECIFIED WHETHER SCIATICA PRESENT: ICD-10-CM

## 2025-02-04 DIAGNOSIS — M51.362 DEGENERATION OF INTERVERTEBRAL DISC OF LUMBAR REGION WITH DISCOGENIC BACK PAIN AND LOWER EXTREMITY PAIN: ICD-10-CM

## 2025-02-04 DIAGNOSIS — Z41.9 SURGERY, ELECTIVE: ICD-10-CM

## 2025-02-04 LAB — GLUCOSE BLDC GLUCOMTR-MCNC: 111 MG/DL (ref 70–130)

## 2025-02-04 PROCEDURE — 25010000002 LIDOCAINE PF 1% 1 % SOLUTION 5 ML VIAL: Performed by: ANESTHESIOLOGY

## 2025-02-04 PROCEDURE — 25510000001 IOPAMIDOL 61 % SOLUTION 30 ML VIAL: Performed by: ANESTHESIOLOGY

## 2025-02-04 PROCEDURE — 25010000002 BUPIVACAINE (PF) 0.5 % SOLUTION 10 ML VIAL: Performed by: ANESTHESIOLOGY

## 2025-02-04 PROCEDURE — 76000 FLUOROSCOPY <1 HR PHYS/QHP: CPT

## 2025-02-04 PROCEDURE — 64483 NJX AA&/STRD TFRM EPI L/S 1: CPT | Performed by: ANESTHESIOLOGY

## 2025-02-04 PROCEDURE — 25010000002 METHYLPREDNISOLONE PER 80 MG: Performed by: ANESTHESIOLOGY

## 2025-02-04 PROCEDURE — 77002 NEEDLE LOCALIZATION BY XRAY: CPT

## 2025-02-04 NOTE — OP NOTE
Bilateral L4 Lumbar Transforaminal Epidural Steroid Injection  [facility]    PREOPERATIVE DIAGNOSIS:  Lumbar Radiculopathy    POSTOPERATIVE DIAGNOSIS:  Same as preop diagnosis    PROCEDURE:  CPT 94406(-50) --  Diagnostic Transforaminal Epidural Steroid Injection at the L4 level, bilaterally    PRE-PROCEDURE DISCUSSION WITH PATIENT:    Risks and complications were discussed with the patient prior to starting the procedure and informed consent was obtained.  We discussed various topics including but not limited to bleeding, infection, injury, nerve injury, paralysis, coma, death, postprocedural painful flare-up, postprocedural site soreness, and a lack of pain relief.  We discussed the diagnostic aspect of transforaminal epidural / selective nerve root blockade.    SURGEON:  Raul Moreira MD    REASON FOR PROCEDURE:    Degenerative changes are noted in the area., Radiating pattern of pain is likely consistent with degenerative changes in the area., and Radicular pain pattern seems consistent with this dermatome.    SEDATION:  Patient declined administration of moderate sedation    ANESTHETIC:  Marcaine 0.25%  STEROID:  Methylprednisolone (DEPO MEDROL) 80mg/ml    DESCRIPTON OF PROCEDURE:  After obtaining informed consent, an I.V. was not started in the preoperative area. The patient taken to the operating room and was placed in the prone position with a pillow under the abdomen.  All pressure points were well padded.  EKG, blood pressure, and pulse oximeter were monitored.  The lumbosacral area was prepped with Chloraprep and draped in a sterile fashion. Under fluoroscopic guidance in an oblique dimension, the transverse process of the aforementioned vertebra at the junction of the body at 6 o'clock position on one side was identified. Skin and subcutaneous tissue was anesthetized with 1% lidocaine. A 22-gauge spinal needle was introduced under fluoroscopic guidance at the above junction into the  foramen without parasthesias and into the epidural space. After confirming the position of the needle with PA, lateral, and oblique fluoroscopic views, aspiration was checked and was clear of blood or CSF.  Next, 1 mL of Omnipaque was injected. After seeing adequate spread on the corresponding nerve root, a total volume 2mL of injectate containing 0.5ml of the above mentioned local anesthetic, 1 ml saline,  and half of the above mentioned corticosteroid was injected into the epidural space.    The needle was removed intact.      Next, the same vertebral level and corresponding foramen on the contralateral side was visualized with fluoroscopy in an oblique view, and a similar approach was used to place the spinal needle in that foramen.  After confirming the position of the needle with PA, lateral, and oblique fluoroscopic views, aspiration was checked and was clear of blood or CSF.  Next, 1 mL of Omnipaque was injected. After seeing adequate spread on the corresponding nerve root, a total volume 2mL of injectate containing 0.5ml of the above mentioned local anesthetic, 1 ml saline, and half of the above mentioned corticosteroid was injected into the epidural space. The needle was removed intact.  Vital signs were stable throughout.      ESTIMATED BLOOD LOSS:  <5 mL  SPECIMENS:  none    COMPLICATIONS:   No complications were noted., There was no indication of vascular uptake on live injection of contrast dye., There was no indication of intrathecal uptake on live injection of contrast dye., There was not any evidence of dural puncture.  , and The patient did not have any signs of postprocedure numbness nor weakness.    TOLERANCE & DISCHARGE CONDITION:    The patient tolerated the procedure well.  The patient was transported to the recovery area without difficulties.  The patient was discharged to home under the care of family in stable and satisfactory condition.    PLAN OF CARE:  The patient was given our standard  instruction sheet.  The patient will Return to clinic 4-6 wks.  The patient will resume all medications as per the medication reconciliation sheet.

## 2025-02-04 NOTE — DISCHARGE INSTRUCTIONS
[facility] - Post-procedure Instructions          --  While there are no absolute restrictions, it is recommended that you do not perform strenuous activity today. In the morning, you may resume your level of activity as before your block.    --  If you have a band-aid at your injection site, please remove it later today. Observe the area for any redness, swelling, pus-like drainage, or a temperature over 101°. If any of these symptoms occur, please call your doctor at [phone]. If after office hours, leave a message and the on-call provider will return your call.    --  Ice may be applied to your injection site. It is recommended you avoid direct heat (heating pad; hot tub) for 1-2 days.    --  Call [facility] at [phone] if you experience persistent headache, persistent bleeding from the injection site, or severe pain not relieved by heat or oral medication.    --  Do not make important decisions today.    --  Due to the effects of the block and/or the I.V. Sedation, DO NOT drive or operate hazardous machinery for 12 hours.  Local anesthetics may cause numbness after procedure and precautions must be taken with regards to operating equipment as well as with walking, even if ambulating with assistance of another person or with an assistive device.    --  Do not drink alcohol for 12 hours.    -- You may return to work tomorrow, or as directed by your referring doctor.    --  Occasionally you may notice a slight increase in your pain after the procedure. This should start to improve within the next 24-48 hours. Radiofrequency ablation procedure pain may last 3-4 weeks.    --  It may take as long as 3-4 days before you notice a gradual improvement in your pain and/or other symptoms.    -- You may continue to take your prescribed pain medication as needed.    --  Some normal possible side effects of steroid use could include fluid retention, increased blood sugar, dull headache,  increased sweating, increased appetite, mood swings and flushing.    --  Diabetics are recommended to watch their blood glucose level closely for 24-48 hours after the injection.    --  Must stay in PACU for 20 min upon arrival and prove no leg weakness before being discharged.    --  IN THE EVENT OF A LIFE THREATENING EMERGENCY, (CHEST PAIN, BREATHING DIFFICULTIES, PARALYSIS…) YOU SHOULD GO TO YOUR NEAREST EMERGENCY ROOM.    --  You should be contacted by our office within 2-3 days to schedule follow up or next appointment date.  If not contacted within 7 days, please call the office at [phone]

## 2025-02-10 ENCOUNTER — OFFICE VISIT (OUTPATIENT)
Dept: FAMILY MEDICINE CLINIC | Facility: CLINIC | Age: 65
End: 2025-02-10
Payer: COMMERCIAL

## 2025-02-10 VITALS
HEART RATE: 92 BPM | BODY MASS INDEX: 27.15 KG/M2 | DIASTOLIC BLOOD PRESSURE: 72 MMHG | OXYGEN SATURATION: 98 % | SYSTOLIC BLOOD PRESSURE: 126 MMHG | HEIGHT: 67 IN | WEIGHT: 173 LBS

## 2025-02-10 DIAGNOSIS — E66.09 CLASS 1 OBESITY DUE TO EXCESS CALORIES WITH SERIOUS COMORBIDITY AND BODY MASS INDEX (BMI) OF 33.0 TO 33.9 IN ADULT: Primary | ICD-10-CM

## 2025-02-10 DIAGNOSIS — E66.811 CLASS 1 OBESITY DUE TO EXCESS CALORIES WITH SERIOUS COMORBIDITY AND BODY MASS INDEX (BMI) OF 33.0 TO 33.9 IN ADULT: Primary | ICD-10-CM

## 2025-02-10 DIAGNOSIS — R73.03 PREDIABETES: ICD-10-CM

## 2025-02-10 DIAGNOSIS — E78.2 MIXED HYPERLIPIDEMIA: ICD-10-CM

## 2025-02-10 DIAGNOSIS — Z00.00 HEALTH MAINTENANCE EXAMINATION: ICD-10-CM

## 2025-02-10 PROCEDURE — 99213 OFFICE O/P EST LOW 20 MIN: CPT | Performed by: NURSE PRACTITIONER

## 2025-02-10 NOTE — PATIENT INSTRUCTIONS
Discharge instructions continue present plan, looks like you doing well,  Any difficulties medicine increased anxiety, irritability, insomnia palpitations elevated blood pressure stop the medicine and seek immediate treatment return to office otherwise looks like you doing great keep the good changes you are making I will see back in 3 to 4 months for your physical    Update me couple weeks so I can adjust your medication for the 90-day

## 2025-02-10 NOTE — PROGRESS NOTES
"Chief Complaint  Weight Loss    Subjective        Ro Perez presents to [facility]  History of Present Illness  Pleasant patient here today follow-up, metabolic difficulties, with obesity prediabetes, she has been doing very well with the new medication no problems,  She is presently in 173 pounds her BMI is improved to 27.1\after the last change she initially had some progress but then, progress is decreased over the last couple months, and she would like to go to the next level, she is down 5 pounds from 11/20/2024  She is having no problems no anxiety irritability, or insomnia from the medication or difficulties,  Was down to 155 which she would like to get down there again with a GLP-1 agonist or insurance no longer covers, and this would be a similar goal  She would like to go up to the next level generic Qsymia    Weight Loss  Pertinent negative symptoms include no abdominal pain, no anorexia, no joint pain, no change in stool, no chest pain, no chills, no congestion, no cough, no diaphoresis, no fatigue, no fever, no headaches, no joint swelling, no myalgias, no nausea, no neck pain, no numbness, no rash, no sore throat, no swollen glands, no dysuria, no vertigo, no visual change, no vomiting and no weakness.     Follow up on medication  Onset was at an unknown time.   Pertinent negative symptoms include no abdominal pain, no anorexia, no joint pain, no change in stool, no chest pain, no chills, no congestion, no cough, no diaphoresis, no fatigue, no fever, no headaches, no joint swelling, no myalgias, no nausea, no neck pain, no numbness, no rash, no sore throat, no swollen glands, no dysuria, no vertigo, no visual change, no vomiting and no weakness.       Objective   Vital Signs:  /72 (BP Location: Right arm, Patient Position: Sitting, Cuff Size: Large Adult)   Pulse 92   Ht 170.2 cm (67\")   Wt 78.5 kg (173 lb)   SpO2 98%   BMI 27.10 kg/m²   Estimated body mass " "index is 27.1 kg/m² as calculated from the following:    Height as of this encounter: 170.2 cm (67\").    Weight as of this encounter: 78.5 kg (173 lb).            Physical Exam  Vitals reviewed.   Constitutional:       General: She is not in acute distress.     Appearance: Normal appearance. She is well-developed. She is not ill-appearing, toxic-appearing or diaphoretic.   HENT:      Head: Normocephalic.      Nose: Nose normal.   Eyes:      General: No scleral icterus.     Conjunctiva/sclera: Conjunctivae normal.      Pupils: Pupils are equal, round, and reactive to light.   Neck:      Thyroid: No thyromegaly.      Vascular: No JVD.   Cardiovascular:      Rate and Rhythm: Normal rate and regular rhythm.      Heart sounds: Normal heart sounds.      No friction rub.   Pulmonary:      Effort: Pulmonary effort is normal.   Abdominal:      General: Bowel sounds are normal.      Palpations: Abdomen is soft.      Tenderness: There is no abdominal tenderness.      Comments: No hepatosplenomegaly, no ascites,   Musculoskeletal:         General: No tenderness.      Cervical back: Neck supple.   Lymphadenopathy:      Cervical: No cervical adenopathy.   Skin:     General: Skin is warm and dry.      Findings: No erythema or rash.   Neurological:      General: No focal deficit present.      Mental Status: She is alert and oriented to person, place, and time. Mental status is at baseline.      Deep Tendon Reflexes: Reflexes are normal and symmetric.   Psychiatric:         Mood and Affect: Mood normal.         Behavior: Behavior normal.         Thought Content: Thought content normal.         Judgment: Judgment normal.        Result Review :                Assessment and Plan   Diagnoses and all orders for this visit:    1. Class 1 obesity due to excess calories with serious comorbidity and body mass index (BMI) of 33.0 to 33.9 in adult (Primary)  Comments:  Prior to starting GLP-1 agonist and weight loss medication her initial " baseline    2. Prediabetes    Other orders  -     Phentermine-Topiramate 11.25-69 MG capsule sustained-release 24 hr; Take 1 each by mouth Daily.  Dispense: 30 capsule; Refill: 0             Follow Up   No follow-ups on file.  Patient was given instructions and counseling regarding her condition or for health maintenance advice. Please see specific information pulled into the AVS if appropriate.     There are no Patient Instructions on file for this visit.

## 2025-02-25 ENCOUNTER — TELEPHONE (OUTPATIENT)
Dept: FAMILY MEDICINE CLINIC | Facility: CLINIC | Age: 65
End: 2025-02-25

## 2025-02-25 NOTE — TELEPHONE ENCOUNTER
Caller: Ro Perez    Relationship: Self    Best call back number: [phone]     Who are you requesting to speak with (clinical staff, provider,  specific staff member): JAMES EPLEY     What was the call regarding: PATIENT STARTED THE QSYMIA 11.25 MG AND STATES SHE DOING VERY WELL ON MEDICATION AND WOULD LIKE A PRESCRIPTION FOR 90 DAYS TO BE CALLED TO PHARMACY PLEASE     PHARMACY:   [id number] - [place], [place] - [address] - [phone] [place] [phone] [facility] [phone]

## 2025-03-03 ENCOUNTER — PREP FOR SURGERY (OUTPATIENT)
Dept: SURGERY | Facility: SURGERY CENTER | Age: 65
End: 2025-03-03
Payer: COMMERCIAL

## 2025-03-03 ENCOUNTER — OFFICE VISIT (OUTPATIENT)
Dept: PAIN MEDICINE | Facility: CLINIC | Age: 65
End: 2025-03-03
Payer: COMMERCIAL

## 2025-03-03 VITALS
SYSTOLIC BLOOD PRESSURE: 136 MMHG | TEMPERATURE: 96.4 F | HEART RATE: 91 BPM | OXYGEN SATURATION: 97 % | WEIGHT: 173.6 LBS | HEIGHT: 67 IN | RESPIRATION RATE: 16 BRPM | BODY MASS INDEX: 27.25 KG/M2 | DIASTOLIC BLOOD PRESSURE: 75 MMHG

## 2025-03-03 DIAGNOSIS — M51.362 DEGENERATION OF INTERVERTEBRAL DISC OF LUMBAR REGION WITH DISCOGENIC BACK PAIN AND LOWER EXTREMITY PAIN: Primary | ICD-10-CM

## 2025-03-03 DIAGNOSIS — M47.816 LUMBAR FACET ARTHROPATHY: ICD-10-CM

## 2025-03-03 DIAGNOSIS — M47.816 LUMBAR FACET ARTHROPATHY: Primary | ICD-10-CM

## 2025-03-03 DIAGNOSIS — M54.50 CHRONIC LOW BACK PAIN, UNSPECIFIED BACK PAIN LATERALITY, UNSPECIFIED WHETHER SCIATICA PRESENT: ICD-10-CM

## 2025-03-03 DIAGNOSIS — M51.362 DEGENERATION OF INTERVERTEBRAL DISC OF LUMBAR REGION WITH DISCOGENIC BACK PAIN AND LOWER EXTREMITY PAIN: ICD-10-CM

## 2025-03-03 DIAGNOSIS — M54.16 LUMBAR RADICULOPATHY: ICD-10-CM

## 2025-03-03 DIAGNOSIS — G89.29 CHRONIC LOW BACK PAIN, UNSPECIFIED BACK PAIN LATERALITY, UNSPECIFIED WHETHER SCIATICA PRESENT: ICD-10-CM

## 2025-03-03 LAB
POC AMPHETAMINES: POSITIVE
POC BARBITURATES: NEGATIVE
POC BENZODIAZEPHINES: NEGATIVE
POC BUPRENORPHINE: NEGATIVE
POC COCAINE: NEGATIVE
POC METHADONE: NEGATIVE
POC METHAMPHETAMINE SCREEN URINE: NEGATIVE
POC OPIATES: NEGATIVE
POC OXYCODONE: NEGATIVE
POC PHENCYCLIDINE: NEGATIVE
POC PROPOXYPHENE: NEGATIVE
POC THC: NEGATIVE
POC TRICYCLIC ANTIDEPRESSANTS: NEGATIVE

## 2025-03-03 PROCEDURE — 80305 DRUG TEST PRSMV DIR OPT OBS: CPT

## 2025-03-03 PROCEDURE — 99214 OFFICE O/P EST MOD 30 MIN: CPT

## 2025-03-03 RX ORDER — FINASTERIDE 5 MG/1
5 TABLET, FILM COATED ORAL DAILY
COMMUNITY
Start: 2025-03-01

## 2025-03-03 RX ORDER — PREGABALIN 150 MG/1
150 CAPSULE ORAL 2 TIMES DAILY
Qty: 60 CAPSULE | Refills: 0 | Status: SHIPPED | OUTPATIENT
Start: 2025-03-03

## 2025-03-03 RX ORDER — LIDOCAINE 50 MG/G
1 PATCH TOPICAL EVERY 24 HOURS
Qty: 30 PATCH | Refills: 2 | Status: SHIPPED | OUTPATIENT
Start: 2025-03-03

## 2025-03-03 NOTE — PROGRESS NOTES
CHIEF COMPLAINT  Back pain    Subjective   Ro Perez is a 65 y.o. female  who presents to the office for follow-up of procedure.  She completed a Bilateral L4 TF LESI on 2/4/24 performed by Dr. Moreira for management of low back pain. Patient reports No relief from the procedure.     Today pain is 5/10VAS in severity (severity in pain varies based on activity level). Her main complaint today is axial low back pain that radiates across her low back in a bandlike pattern. Pain will intermittently radiate into bilateral hips/anterior thighs terminating just above the knee. She contributes 75% of pain to her back and 25% to her legs. Describes this pain as an intermittent aching, burning, and shooting. Pain is worsened by walking long distances, prolonged sitting or standing, rising from sitting to standing position, and bending/twisting. Pain improve with rest/reposition, and heat.  Mixed results with PT in the past.      She was started on Pregabalin 75mg at her last office visit. She is currently taking 1 capsule at night and has not noticed much improvement. She reports some drowsiness with medication. Gabapentin caused dizziness. She continues with OTC Tylenol or Ibuprofen.      Patient works at [employer].     Procedures:  2/4/24 - Bilateral L4 TF LESI - no relief   11/20/24 - Left L3 & L4 TF LESI - 50% relief for 3 months   8/6/24 - Left L3 & L4 TF LESI - 50% relief x 3 months  6/12/24 - L3-L4 LESI - 20% ongoing relief   2/20/24 - L3-L4 LESI - 100% x 2.5 months  11/1/23 - L3-L4 LESI - 100% relief x 2.5 months    Back Pain  This is a chronic problem. The current episode started more than 1 year ago. The problem occurs intermittently. The problem is unchanged. The pain is present in the lumbar spine. The quality of the pain is described as shooting, aching and burning. The pain does not radiate (left anterior thigh and left lateral leg into ankle). The pain is at a severity of 5/10 (severity in pain varies based on  activity level). The pain is moderate. The pain is The same all the time. The symptoms are aggravated by position, sitting and standing (walking long distances,). Associated symptoms include numbness (left kimberlyn). Pertinent negatives include no abdominal pain, chest pain, dysuria, fever, headaches or weakness. She has tried chiropractic manipulation, heat and NSAIDs (PT,) for the symptoms. The treatment provided mild relief.     PEG Assessment   What number best describes your pain on average in the past week?7  What number best describes how, during the past week, pain has interfered with your enjoyment of life?0  What number best describes how, during the past week, pain has interfered with your general activity?  0    Review of Pertinent Medical Data ---        The following portions of the patient's history were reviewed and updated as appropriate: allergies, current medications, past family history, past medical history, past social history, past surgical history, and problem list.    Review of Systems   Constitutional:  Negative for activity change, fatigue and fever.   Respiratory:  Negative for cough and chest tightness.    Cardiovascular:  Negative for chest pain.   Gastrointestinal:  Negative for abdominal pain, constipation and diarrhea.   Genitourinary:  Negative for dysuria.   Musculoskeletal:  Positive for back pain.   Neurological:  Positive for numbness (left kimberlyn). Negative for dizziness, weakness, light-headedness and headaches.   Psychiatric/Behavioral:  Negative for agitation, sleep disturbance and suicidal ideas. The patient is not nervous/anxious.      I have reviewed and confirmed the accuracy of the ROS as documented by the MA/LPN/RN VINH Phillips     Vitals:    03/03/25 0913   BP: 136/75   BP Location: Left arm   Patient Position: Sitting   Cuff Size: Adult   Pulse: 91   Resp: 16   Temp: 96.4 °F (35.8 °C)   TempSrc: Temporal   SpO2: 97%   Weight: 78.7 kg (173 lb 9.6 oz)   Height:  "170.2 cm (67\")   PainSc: 5      Objective   Physical Exam  Constitutional:       Appearance: Normal appearance.   HENT:      Head: Normocephalic.   Cardiovascular:      Rate and Rhythm: Normal rate and regular rhythm.   Pulmonary:      Effort: Pulmonary effort is normal.      Breath sounds: Normal breath sounds.   Musculoskeletal:      Cervical back: Normal range of motion.      Lumbar back: Tenderness present. Decreased range of motion. Positive right straight leg raise test and positive left straight leg raise test.      Left hip: Tenderness present.      Comments: + lumbar facet loading/tenderness   Skin:     General: Skin is warm and dry.      Capillary Refill: Capillary refill takes less than 2 seconds.   Neurological:      General: No focal deficit present.      Mental Status: She is alert and oriented to person, place, and time.   Psychiatric:         Mood and Affect: Mood normal.         Behavior: Behavior normal.         Thought Content: Thought content normal.         Cognition and Memory: Cognition normal.       Assessment & Plan   Diagnoses and all orders for this visit:    1. Degeneration of intervertebral disc of lumbar region with discogenic back pain and lower extremity pain (Primary)  -     lidocaine (LIDODERM) 5 %; Place 1 patch on the skin as directed by provider Daily. Remove & Discard patch within 12 hours or as directed by MD  Dispense: 30 patch; Refill: 2  -     Urine Drug Screen Confirmation - Urine, Clean Catch; Future  -     POC Urine Drug Screen, Triage    2. Lumbar facet arthropathy  -     Urine Drug Screen Confirmation - Urine, Clean Catch; Future  -     POC Urine Drug Screen, Triage    3. Lumbar radiculopathy  -     pregabalin (LYRICA) 150 MG capsule; Take 1 capsule by mouth 2 (Two) Times a Day.  Dispense: 60 capsule; Refill: 0  -     Urine Drug Screen Confirmation - Urine, Clean Catch; Future  -     POC Urine Drug Screen, Triage      Ro Perez reports a pain score of 5.  Given her " "pain assessment as noted, treatment options were discussed and the following options were decided upon as a follow-up plan to address the patient's pain: continuation of current treatment plan for pain, prescription for non-opiod analgesics, and prescription for opiod analgesics.    --- Bilateral L3-L5 MBB x 2 with goal of RFA  -------  Education about Medial Branch Blockade and RF Therapy:  This medial branch blockade (MBB) suggested is intended for diagnostic purposes, with the intent of offering the patient Radiofrequency thermal rhizotomy (RF) if the MBB is diagnostically effective.  The diagnostic blockade is necessary to determine the likelihood that RF therapy could be efficacious in providing long term relief to the patient.    Medial branches are sensory nerve branches that connect to a facet joint and transmit sensations & pain signals from that joint.  Facet is a term for the type of joints found in the spine.  Medial branches are the nerves that go to a facet, and therefore are also sometimes called \"facet joint nerves\" (FJNs).      In a medial branch blockade procedure, xray fluoroscopy is used to verify the locations of the outside of the joint lines which are being targeted.  Under xray guidance, needles are placed to these areas.  Contrast dye is injected to confirm proper placement, with dye flowing over the joint area, and to ensure that the dye does not flow into unintended areas such as a vein.  When this is confirmed, local anesthetic is injected to block the medial branch at that joint level.      If MBBs are diagnostically successful in blocking pain, then the patient is most likely a great candidate for Radiofrequency of those facet joint nerves.  In the RF procedure, needles are placed to the joint lines in the same fashion, and after testing, the needle tips are heated to thermally treat the nerves, blocking the nerves by in essence damaging the nerves with the heat treatment(non-pulsed).  "      Medically, a successful RF procedure should provide a patient with 50% pain relief or more for at least 6 months.  Clinical experience suggests that successful patients receive relief more in the range of 12 months on average.  We also discussed that a fortunate minority of patients receive therapeutic success from the MBB, and may not require RF ablation.  If a patient receives more than 8 weeks of relief from MBB, then occasional repeat MBB for therapeutic purposes is a very reasonable alternative therapy.  This course of therapy is consistent with our LCDs according to our CMS  in the area, and therefore other insurance providers should follow accordingly.  We will monitor our patients to screen for these therapeutic responders and will offer RF therapy only when necessary.      We discussed that MBB & RF are not without risks.  Guidelines regarding anticoagulant use & neuraxial procedures will be respected.  Patients that are ill or otherwise may be at risk for sepsis will not have their spines accessed by neuraxial injections of any type.  This patient will not be offered these therapies if there is an increased risk.   We discussed that there is a risk of postprocedural pain and also a risk of worsening of clinical picture with these procedures as with any neuraxial procedure.    -------    --- Routine UDS in office today as part of monitoring requirements for controlled substances.  The specimen was viewed and the immunoassay result reviewed and is +AMP.  This specimen will be sent to [facility] laboratory for confirmation.     --- Lidocaine 5% patches to pharmacy.   --- Will increase Pregabalin to 150mg BID to see if this more beneficial to her pain.    --- Follow-up for procedure     Diagnostic Facet Joint Procedure:   MBB   The first diagnostic facet joint procedure is considered medically reasonable and necessary for the diagnosis and treatment of chronic pain for this patient due to the  patient meeting all of the following criteria:    - 1. Moderate to severe chronic neck or low back pain, predominantly axial, that causes functional deficit measured on pain or disability scale.  - 2. Pain present for minimum of 3 months with documented failure to respond to noninvasive conservative management (as tolerated)  - 3. Absence of untreated radiculopathy or neurogenic claudication (except for radiculopathy caused by facet joint synovial cyst)  - 4. There is no non-facet pathology per clinical assessment or radiology studies that could explain the source of the patient’s pain, including but not limited to fracture, tumor, infection, or significant deformity.       MANJULA REPORT  As part of the patient's treatment plan, I am prescribing controlled substances. The patient has been made aware of appropriate use of such medications, including potential risk of somnolence, limited ability to drive and/or work safely, and the potential for dependence or overdose. It has also been made clear that these medications are for use by this patient only, without concomitant use of alcohol or other substances unless prescribed.     Patient has completed prescribing agreement detailing terms of continued prescribing of controlled substances, including monitoring MANJULA reports, urine drug screening, and pill counts if necessary. The patient is aware that inappropriate use will results in cessation of prescribing such medications.    As the clinician, I personally reviewed the MANJULA from 3/3/25 while the patient was in the office today.    History and physical exam exhibit continued safe and appropriate use of controlled substances.    Dictated utilizing Dragon dictation.

## 2025-03-03 NOTE — H&P (VIEW-ONLY)
CHIEF COMPLAINT  Back pain    Subjective   Ro Perez is a 65 y.o. female  who presents to the office for follow-up of procedure.  She completed a Bilateral L4 TF LESI on 2/4/24 performed by Dr. Moreira for management of low back pain. Patient reports No relief from the procedure.     Today pain is 5/10VAS in severity (severity in pain varies based on activity level). Her main complaint today is axial low back pain that radiates across her low back in a bandlike pattern. Pain will intermittently radiate into bilateral hips/anterior thighs terminating just above the knee. She contributes 75% of pain to her back and 25% to her legs. Describes this pain as an intermittent aching, burning, and shooting. Pain is worsened by walking long distances, prolonged sitting or standing, rising from sitting to standing position, and bending/twisting. Pain improve with rest/reposition, and heat.  Mixed results with PT in the past.      She was started on Pregabalin 75mg at her last office visit. She is currently taking 1 capsule at night and has not noticed much improvement. She reports some drowsiness with medication. Gabapentin caused dizziness. She continues with OTC Tylenol or Ibuprofen.      Patient works at [employer].     Procedures:  2/4/24 - Bilateral L4 TF LESI - no relief   11/20/24 - Left L3 & L4 TF LESI - 50% relief for 3 months   8/6/24 - Left L3 & L4 TF LESI - 50% relief x 3 months  6/12/24 - L3-L4 LESI - 20% ongoing relief   2/20/24 - L3-L4 LESI - 100% x 2.5 months  11/1/23 - L3-L4 LESI - 100% relief x 2.5 months    Back Pain  This is a chronic problem. The current episode started more than 1 year ago. The problem occurs intermittently. The problem is unchanged. The pain is present in the lumbar spine. The quality of the pain is described as shooting, aching and burning. The pain does not radiate (left anterior thigh and left lateral leg into ankle). The pain is at a severity of 5/10 (severity in pain varies based on  activity level). The pain is moderate. The pain is The same all the time. The symptoms are aggravated by position, sitting and standing (walking long distances,). Associated symptoms include numbness (left kimberlyn). Pertinent negatives include no abdominal pain, chest pain, dysuria, fever, headaches or weakness. She has tried chiropractic manipulation, heat and NSAIDs (PT,) for the symptoms. The treatment provided mild relief.     PEG Assessment   What number best describes your pain on average in the past week?7  What number best describes how, during the past week, pain has interfered with your enjoyment of life?0  What number best describes how, during the past week, pain has interfered with your general activity?  0    Review of Pertinent Medical Data ---        The following portions of the patient's history were reviewed and updated as appropriate: allergies, current medications, past family history, past medical history, past social history, past surgical history, and problem list.    Review of Systems   Constitutional:  Negative for activity change, fatigue and fever.   Respiratory:  Negative for cough and chest tightness.    Cardiovascular:  Negative for chest pain.   Gastrointestinal:  Negative for abdominal pain, constipation and diarrhea.   Genitourinary:  Negative for dysuria.   Musculoskeletal:  Positive for back pain.   Neurological:  Positive for numbness (left kimberlyn). Negative for dizziness, weakness, light-headedness and headaches.   Psychiatric/Behavioral:  Negative for agitation, sleep disturbance and suicidal ideas. The patient is not nervous/anxious.      I have reviewed and confirmed the accuracy of the ROS as documented by the MA/LPN/RN VINH Phillips     Vitals:    03/03/25 0913   BP: 136/75   BP Location: Left arm   Patient Position: Sitting   Cuff Size: Adult   Pulse: 91   Resp: 16   Temp: 96.4 °F (35.8 °C)   TempSrc: Temporal   SpO2: 97%   Weight: 78.7 kg (173 lb 9.6 oz)   Height:  "170.2 cm (67\")   PainSc: 5      Objective   Physical Exam  Constitutional:       Appearance: Normal appearance.   HENT:      Head: Normocephalic.   Cardiovascular:      Rate and Rhythm: Normal rate and regular rhythm.   Pulmonary:      Effort: Pulmonary effort is normal.      Breath sounds: Normal breath sounds.   Musculoskeletal:      Cervical back: Normal range of motion.      Lumbar back: Tenderness present. Decreased range of motion. Positive right straight leg raise test and positive left straight leg raise test.      Left hip: Tenderness present.      Comments: + lumbar facet loading/tenderness   Skin:     General: Skin is warm and dry.      Capillary Refill: Capillary refill takes less than 2 seconds.   Neurological:      General: No focal deficit present.      Mental Status: She is alert and oriented to person, place, and time.   Psychiatric:         Mood and Affect: Mood normal.         Behavior: Behavior normal.         Thought Content: Thought content normal.         Cognition and Memory: Cognition normal.       Assessment & Plan   Diagnoses and all orders for this visit:    1. Degeneration of intervertebral disc of lumbar region with discogenic back pain and lower extremity pain (Primary)  -     lidocaine (LIDODERM) 5 %; Place 1 patch on the skin as directed by provider Daily. Remove & Discard patch within 12 hours or as directed by MD  Dispense: 30 patch; Refill: 2  -     Urine Drug Screen Confirmation - Urine, Clean Catch; Future  -     POC Urine Drug Screen, Triage    2. Lumbar facet arthropathy  -     Urine Drug Screen Confirmation - Urine, Clean Catch; Future  -     POC Urine Drug Screen, Triage    3. Lumbar radiculopathy  -     pregabalin (LYRICA) 150 MG capsule; Take 1 capsule by mouth 2 (Two) Times a Day.  Dispense: 60 capsule; Refill: 0  -     Urine Drug Screen Confirmation - Urine, Clean Catch; Future  -     POC Urine Drug Screen, Triage      Ro Perez reports a pain score of 5.  Given her " "pain assessment as noted, treatment options were discussed and the following options were decided upon as a follow-up plan to address the patient's pain: continuation of current treatment plan for pain, prescription for non-opiod analgesics, and prescription for opiod analgesics.    --- Bilateral L3-L5 MBB x 2 with goal of RFA  -------  Education about Medial Branch Blockade and RF Therapy:  This medial branch blockade (MBB) suggested is intended for diagnostic purposes, with the intent of offering the patient Radiofrequency thermal rhizotomy (RF) if the MBB is diagnostically effective.  The diagnostic blockade is necessary to determine the likelihood that RF therapy could be efficacious in providing long term relief to the patient.    Medial branches are sensory nerve branches that connect to a facet joint and transmit sensations & pain signals from that joint.  Facet is a term for the type of joints found in the spine.  Medial branches are the nerves that go to a facet, and therefore are also sometimes called \"facet joint nerves\" (FJNs).      In a medial branch blockade procedure, xray fluoroscopy is used to verify the locations of the outside of the joint lines which are being targeted.  Under xray guidance, needles are placed to these areas.  Contrast dye is injected to confirm proper placement, with dye flowing over the joint area, and to ensure that the dye does not flow into unintended areas such as a vein.  When this is confirmed, local anesthetic is injected to block the medial branch at that joint level.      If MBBs are diagnostically successful in blocking pain, then the patient is most likely a great candidate for Radiofrequency of those facet joint nerves.  In the RF procedure, needles are placed to the joint lines in the same fashion, and after testing, the needle tips are heated to thermally treat the nerves, blocking the nerves by in essence damaging the nerves with the heat treatment(non-pulsed).  "      Medically, a successful RF procedure should provide a patient with 50% pain relief or more for at least 6 months.  Clinical experience suggests that successful patients receive relief more in the range of 12 months on average.  We also discussed that a fortunate minority of patients receive therapeutic success from the MBB, and may not require RF ablation.  If a patient receives more than 8 weeks of relief from MBB, then occasional repeat MBB for therapeutic purposes is a very reasonable alternative therapy.  This course of therapy is consistent with our LCDs according to our CMS  in the area, and therefore other insurance providers should follow accordingly.  We will monitor our patients to screen for these therapeutic responders and will offer RF therapy only when necessary.      We discussed that MBB & RF are not without risks.  Guidelines regarding anticoagulant use & neuraxial procedures will be respected.  Patients that are ill or otherwise may be at risk for sepsis will not have their spines accessed by neuraxial injections of any type.  This patient will not be offered these therapies if there is an increased risk.   We discussed that there is a risk of postprocedural pain and also a risk of worsening of clinical picture with these procedures as with any neuraxial procedure.    -------    --- Routine UDS in office today as part of monitoring requirements for controlled substances.  The specimen was viewed and the immunoassay result reviewed and is +AMP.  This specimen will be sent to [facility] laboratory for confirmation.     --- Lidocaine 5% patches to pharmacy.   --- Will increase Pregabalin to 150mg BID to see if this more beneficial to her pain.    --- Follow-up for procedure     Diagnostic Facet Joint Procedure:   MBB   The first diagnostic facet joint procedure is considered medically reasonable and necessary for the diagnosis and treatment of chronic pain for this patient due to the  patient meeting all of the following criteria:    - 1. Moderate to severe chronic neck or low back pain, predominantly axial, that causes functional deficit measured on pain or disability scale.  - 2. Pain present for minimum of 3 months with documented failure to respond to noninvasive conservative management (as tolerated)  - 3. Absence of untreated radiculopathy or neurogenic claudication (except for radiculopathy caused by facet joint synovial cyst)  - 4. There is no non-facet pathology per clinical assessment or radiology studies that could explain the source of the patient’s pain, including but not limited to fracture, tumor, infection, or significant deformity.       MANJULA REPORT  As part of the patient's treatment plan, I am prescribing controlled substances. The patient has been made aware of appropriate use of such medications, including potential risk of somnolence, limited ability to drive and/or work safely, and the potential for dependence or overdose. It has also been made clear that these medications are for use by this patient only, without concomitant use of alcohol or other substances unless prescribed.     Patient has completed prescribing agreement detailing terms of continued prescribing of controlled substances, including monitoring MANJULA reports, urine drug screening, and pill counts if necessary. The patient is aware that inappropriate use will results in cessation of prescribing such medications.    As the clinician, I personally reviewed the MANJULA from 3/3/25 while the patient was in the office today.    History and physical exam exhibit continued safe and appropriate use of controlled substances.    Dictated utilizing Dragon dictation.

## 2025-03-05 ENCOUNTER — TRANSCRIBE ORDERS (OUTPATIENT)
Dept: SURGERY | Facility: SURGERY CENTER | Age: 65
End: 2025-03-05
Payer: COMMERCIAL

## 2025-03-05 DIAGNOSIS — Z41.9 SURGERY, ELECTIVE: Primary | ICD-10-CM

## 2025-03-24 NOTE — SIGNIFICANT NOTE
Patient educated on the following :    - If you are receiving Sedation for your procedure Nothing to Eat 6 hours and only clear liquids for 2 hours prior to your procedure.    -You will need to have someone drive you home after your PROCEDURE and remain with you for 24 hours after the PROCEDURE  - The date of your procedure, you will need your family member to remain at the facility the entire time you are here.  -You will need to arrive at 1345 on 3/25/25 PROCEDURE  -Please contact [facility] at: [phone] with any questions and/or concerns.

## 2025-03-25 ENCOUNTER — HOSPITAL ENCOUNTER (OUTPATIENT)
Dept: GENERAL RADIOLOGY | Facility: SURGERY CENTER | Age: 65
Setting detail: HOSPITAL OUTPATIENT SURGERY
End: 2025-03-25
Payer: COMMERCIAL

## 2025-03-25 ENCOUNTER — HOSPITAL ENCOUNTER (OUTPATIENT)
Facility: SURGERY CENTER | Age: 65
Setting detail: HOSPITAL OUTPATIENT SURGERY
Discharge: HOME | End: 2025-03-25
Attending: ANESTHESIOLOGY | Admitting: ANESTHESIOLOGY
Payer: COMMERCIAL

## 2025-03-25 VITALS
HEART RATE: 77 BPM | TEMPERATURE: 98 F | SYSTOLIC BLOOD PRESSURE: 127 MMHG | DIASTOLIC BLOOD PRESSURE: 70 MMHG | RESPIRATION RATE: 16 BRPM | OXYGEN SATURATION: 96 %

## 2025-03-25 DIAGNOSIS — M47.816 LUMBAR FACET ARTHROPATHY: ICD-10-CM

## 2025-03-25 DIAGNOSIS — G89.29 CHRONIC LOW BACK PAIN, UNSPECIFIED BACK PAIN LATERALITY, UNSPECIFIED WHETHER SCIATICA PRESENT: ICD-10-CM

## 2025-03-25 DIAGNOSIS — M54.50 CHRONIC LOW BACK PAIN, UNSPECIFIED BACK PAIN LATERALITY, UNSPECIFIED WHETHER SCIATICA PRESENT: ICD-10-CM

## 2025-03-25 DIAGNOSIS — Z41.9 SURGERY, ELECTIVE: ICD-10-CM

## 2025-03-25 DIAGNOSIS — M51.362 DEGENERATION OF INTERVERTEBRAL DISC OF LUMBAR REGION WITH DISCOGENIC BACK PAIN AND LOWER EXTREMITY PAIN: ICD-10-CM

## 2025-03-25 LAB — GLUCOSE BLDC GLUCOMTR-MCNC: 117 MG/DL (ref 70–130)

## 2025-03-25 PROCEDURE — 64494 INJ PARAVERT F JNT L/S 2 LEV: CPT | Performed by: ANESTHESIOLOGY

## 2025-03-25 PROCEDURE — 25010000002 BUPIVACAINE (PF) 0.5 % SOLUTION 10 ML VIAL: Performed by: ANESTHESIOLOGY

## 2025-03-25 PROCEDURE — 64493 INJ PARAVERT F JNT L/S 1 LEV: CPT | Performed by: ANESTHESIOLOGY

## 2025-03-25 PROCEDURE — 25510000001 IOPAMIDOL 61 % SOLUTION 30 ML VIAL: Performed by: ANESTHESIOLOGY

## 2025-03-25 PROCEDURE — 25010000002 LIDOCAINE PF 1% 1 % SOLUTION 5 ML VIAL: Performed by: ANESTHESIOLOGY

## 2025-03-25 PROCEDURE — 77002 NEEDLE LOCALIZATION BY XRAY: CPT

## 2025-03-25 PROCEDURE — 76000 FLUOROSCOPY <1 HR PHYS/QHP: CPT

## 2025-03-25 NOTE — DISCHARGE INSTRUCTIONS
[facility] - Post-procedure Instructions          --  While there are no absolute restrictions, it is recommended that you do not perform strenuous activity today. In the morning, you may resume your level of activity as before your block.    --  If you have a band-aid at your injection site, please remove it later today. Observe the area for any redness, swelling, pus-like drainage, or a temperature over 101°. If any of these symptoms occur, please call your doctor at [phone]. If after office hours, leave a message and the on-call provider will return your call.    --  Ice may be applied to your injection site. It is recommended you avoid direct heat (heating pad; hot tub) for 1-2 days.    --  Call [facility] at [phone] if you experience persistent headache, persistent bleeding from the injection site, or severe pain not relieved by heat or oral medication.    --  Do not make important decisions today.    --  Due to the effects of the block and/or the I.V. Sedation, DO NOT drive or operate hazardous machinery for 12 hours.  Local anesthetics may cause numbness after procedure and precautions must be taken with regards to operating equipment as well as with walking, even if ambulating with assistance of another person or with an assistive device.    --  Do not drink alcohol for 12 hours.    -- You may return to work tomorrow, or as directed by your referring doctor.    --  Occasionally you may notice a slight increase in your pain after the procedure. This should start to improve within the next 24-48 hours. Radiofrequency ablation procedure pain may last 3-4 weeks.    --  It may take as long as 3-4 days before you notice a gradual improvement in your pain and/or other symptoms.    -- You may continue to take your prescribed pain medication as needed.    --  Some normal possible side effects of steroid use could include fluid retention, increased blood sugar, dull headache,  increased sweating, increased appetite, mood swings and flushing.    --  Diabetics are recommended to watch their blood glucose level closely for 24-48 hours after the injection.    --  Must stay in PACU for 20 min upon arrival and prove no leg weakness before being discharged.    --  IN THE EVENT OF A LIFE THREATENING EMERGENCY, (CHEST PAIN, BREATHING DIFFICULTIES, PARALYSIS…) YOU SHOULD GO TO YOUR NEAREST EMERGENCY ROOM.    --  You should be contacted by our office within 2-3 days to schedule follow up or next appointment date.  If not contacted within 7 days, please call the office at [phone]

## 2025-03-25 NOTE — OP NOTE
Bilateral L3-5 Lumbar Medial Branch Blockade  [facility]      PREOPERATIVE DIAGNOSIS:  Lumbar spondylosis without myelopathy    POSTOPERATIVE DIAGNOSIS:  Lumbar spondylosis without myelopathy    PROCEDURE:   Diagnostic Bilateral Lumbar Medial Branch Nerve Blockades, with fluoroscopy:  L3, L4, and L5 nerves (at the L4 & L5 transverse processes and the sacral alar groove) to block facet joints L4-5, and L5-S1  18203-69 -- Bilateral Lumbar Facet blocks, 1st Level  82531-59 -- Bilateral Lumbar Facet blocks, 2nd  Level    PRE-PROCEDURE DISCUSSION WITH PATIENT:    Risks and complications were discussed with the patient prior to starting the procedure and informed consent was obtained.      SURGEON:  Raul Moreira MD    REASON FOR PROCEDURE:    The patient complains of pain that seems to have a significant axial component and Painful area identified on exam under fluoroscopy    SEDATION:  Patient declined administration of moderate sedation    ANESTHETIC:  Marcaine 0.5%  STEROID:  NONE  TOTAL VOLUME OF SOLUTION:  6ml    DESCRIPTON OF PROCEDURE:  After obtaining informed consent, IV access was not obtained in the preoperative area.   The patient was taken to the operating room.  The patient was placed in the prone position with a pillow under the abdomen. All pressure points were well padded.  EKG, blood pressure, and pulse oximeter were monitored.  The patient was monitored and sedated by the RN under my direction. The lumbosacral area was prepped with Chloraprep and draped in a sterile fashion. Under fluoroscopic guidance the transverse processes of the L4 and L5 vertebrae at the junctions of the superior articular processes were identified on the right. Also identified was the groove between the ala and the superior articular process of the sacrum on the ipsilateral side.  Skin and subcutaneous tissue were anesthetized with 1% lidocaine above each of these points. A 22-gauge spinal needle was  introduced under fluoroscopic guidance at the above junctions. Aspiration was negative for blood and CSF.  After confirming the position of the needle with fluoroscope in all views, 0.25 mL of Omnipaque was injected, and after seeing the proper spread a total of 1 mL of the anesthetic solution noted above was injected at each of these points.  Needles were removed intact from each of the areas.  A similar procedure was repeated to block the L3, L4, and L5 nerves on the contralateral side.   Onset of analgesia was noted.  Vital signs remained stable throughout.      ESTIMATED BLOOD LOSS:  <5 mL  SPECIMENS:  none    COMPLICATIONS:   No complications were noted., There was no indication of vascular uptake on live injection of contrast dye., and The patient did not have any signs of postprocedure numbness nor weakness.    TOLERANCE & DISCHARGE CONDITION:    The patient tolerated the procedure well.  The patient was transported to the recovery area without difficulties.  The patient was discharged to home under the care of family in stable and satisfactory condition.    PLAN OF CARE:  The patient was given our standard instruction sheet.  We discussed that Lumbar Medial Branch Blockade is a diagnostic procedure in consideration for radiofrequency ablation if two diagnostic procedures prove to be positive for significant benefit.  If sustained relief of 6 to eight weeks is obtained, then an alternative plan could be therapeutic lumbar branch blockades.  The patient is asked to keep a pain log each hour for 8 hours after the procedure today.  The patient will  Return to clinic 1 wk.  The patient will resume all medications as per the medication reconciliation sheet.

## 2025-03-31 ENCOUNTER — OFFICE VISIT (OUTPATIENT)
Dept: PAIN MEDICINE | Facility: CLINIC | Age: 65
End: 2025-03-31
Payer: COMMERCIAL

## 2025-03-31 VITALS
HEIGHT: 67 IN | OXYGEN SATURATION: 97 % | DIASTOLIC BLOOD PRESSURE: 62 MMHG | RESPIRATION RATE: 16 BRPM | BODY MASS INDEX: 27.94 KG/M2 | SYSTOLIC BLOOD PRESSURE: 114 MMHG | WEIGHT: 178 LBS | TEMPERATURE: 96.8 F | HEART RATE: 68 BPM

## 2025-03-31 DIAGNOSIS — M54.16 LUMBAR RADICULOPATHY: ICD-10-CM

## 2025-03-31 DIAGNOSIS — M51.362 DEGENERATION OF INTERVERTEBRAL DISC OF LUMBAR REGION WITH DISCOGENIC BACK PAIN AND LOWER EXTREMITY PAIN: ICD-10-CM

## 2025-03-31 DIAGNOSIS — M47.816 LUMBAR FACET ARTHROPATHY: Primary | ICD-10-CM

## 2025-03-31 PROCEDURE — 99214 OFFICE O/P EST MOD 30 MIN: CPT

## 2025-03-31 RX ORDER — METHYLPREDNISOLONE 4 MG/1
TABLET ORAL
Qty: 21 TABLET | Refills: 0 | Status: SHIPPED | OUTPATIENT
Start: 2025-03-31

## 2025-03-31 NOTE — PROGRESS NOTES
CHIEF COMPLAINT      Subjective   Ro Perez is a 65 y.o. female  who presents for follow-up.  She has a history of ***.          History of Present Illness     PEG Assessment   What number best describes your pain on average in the past week?{NUMBERS 0-10:97287}  What number best describes how, during the past week, pain has interfered with your enjoyment of life?{NUMBERS 0-10:42583}  What number best describes how, during the past week, pain has interfered with your general activity?  {NUMBERS 0-10:59692}    Review of Pertinent Medical Data ---  ***    {Common H&P Review Areas:52646}    Review of Systems  ***  There were no vitals filed for this visit.      Objective   Physical Exam        Assessment & Plan   {Assess/PlanSmartLinks:53262}    Ro Perez reports a pain score of .  Given her pain assessment as noted, treatment options were discussed and the following options were decided upon as a follow-up plan to address the patient's pain: {[facility] PAIN ASSESSMENT FOLLOW-UP PLAN:69791}.      --- Follow-up ***                 ***     Dictated utilizing Dragon dictation.

## 2025-03-31 NOTE — H&P (VIEW-ONLY)
CHIEF COMPLAINT  Back pain    Subjective   Mehnazrosetta Perez is a 65 y.o. female  who presents to the office for follow-up of procedure.  She completed a Bilateral L3-L5 MBB on 3/25/25 performed by Dr. Moreira for management of back pain. Patient reports 95% relief from the procedure for 8 hours. She was able to change positions, climb stairs, vacuum, and rest better following the procedure.     Today pain is 6/10VAS in severity (severity in pain varies based on activity level). She continues to complain of axial low back pain that radiates across her low back in a bandlike pattern. Pain radiates into right anterior thigh stopping below the knee. She contributes 75% of pain to her back and 25% to her legs. Describes this pain as an intermittent aching, burning, and shooting. Pain is worsened by walking long distances, prolonged sitting or standing, rising from sitting to standing position, and bending/twisting. Pain improve with rest/reposition, and heat.  Mixed results with PT in the past.      She takes Pregabalin 150mg daily (unable to BID due to drowsiness). She has no noticed much improvement with this medication.  She reports some drowsiness with medication. Gabapentin caused dizziness. She continues with OTC Tylenol or Ibuprofen and Cymbalta 60mg BID (prescribed by PCP).      Patient works at [employer].    She reports no changes to her health or medications since her last office visit.      Procedures:  3/25/25 - Bilateral L3-L5 MBB - 95% relief x 8 hours  2/4/25 - Bilateral L4 TF LESI - no relief   11/20/24 - Left L3 & L4 TF LESI - 50% relief for 3 months   8/6/24 - Left L3 & L4 TF LESI - 50% relief x 3 months  6/12/24 - L3-L4 LESI - 20% ongoing relief   2/20/24 - L3-L4 LESI - 100% x 2.5 months  11/1/23 - L3-L4 LESI - 100% relief x 2.5 months    Back Pain  This is a chronic problem. The current episode started more than 1 year ago. The problem occurs intermittently. The problem is unchanged. The pain is present in the  lumbar spine. The quality of the pain is described as shooting, aching and burning. The pain does not radiate (left anterior thigh and left lateral leg into ankle). The pain is at a severity of 5/10 (severity in pain varies based on activity level). The pain is moderate. The pain is The same all the time. The symptoms are aggravated by position, sitting and standing (walking long distances,). Associated symptoms include numbness (left leg). Pertinent negatives include no abdominal pain, chest pain, dysuria, fever, headaches or weakness. She has tried chiropractic manipulation, heat and NSAIDs (PT,) for the symptoms. The treatment provided mild relief.     PEG Assessment   What number best describes your pain on average in the past week?8  What number best describes how, during the past week, pain has interfered with your enjoyment of life?0  What number best describes how, during the past week, pain has interfered with your general activity?  5    Review of Pertinent Medical Data ---      The following portions of the patient's history were reviewed and updated as appropriate: allergies, current medications, past family history, past medical history, past social history, past surgical history, and problem list.    Review of Systems   Constitutional:  Negative for activity change and fever.   Cardiovascular:  Negative for chest pain.   Gastrointestinal:  Negative for abdominal pain, constipation and diarrhea.   Genitourinary:  Negative for difficulty urinating and dysuria.   Musculoskeletal:  Positive for back pain and gait problem (left leg drags).   Neurological:  Positive for numbness (left leg). Negative for weakness and headaches.   Psychiatric/Behavioral:  Negative for self-injury, sleep disturbance and suicidal ideas.      I have reviewed and confirmed the accuracy of the ROS as documented by the MA/LPN/RN VINH Phillips    Vitals:    03/31/25 1512   BP: 114/62   Pulse: 68   Resp: 16   Temp: 96.8 °F  "(36 °C)   SpO2: 97%   Weight: 80.7 kg (178 lb)   Height: 170.2 cm (67\")   PainSc: 6      Objective   Physical Exam  Constitutional:       Appearance: Normal appearance.   HENT:      Head: Normocephalic.   Cardiovascular:      Rate and Rhythm: Normal rate and regular rhythm.   Pulmonary:      Effort: Pulmonary effort is normal.      Breath sounds: Normal breath sounds.   Musculoskeletal:      Cervical back: Normal range of motion.      Lumbar back: Tenderness present. Decreased range of motion. Positive right straight leg raise test and positive left straight leg raise test.      Left hip: Tenderness present.      Comments: + lumbar facet loading/tenderness   Skin:     General: Skin is warm and dry.      Capillary Refill: Capillary refill takes less than 2 seconds.   Neurological:      General: No focal deficit present.      Mental Status: She is alert and oriented to person, place, and time.   Psychiatric:         Mood and Affect: Mood normal.         Behavior: Behavior normal.         Thought Content: Thought content normal.         Cognition and Memory: Cognition normal.       Assessment & Plan   Diagnoses and all orders for this visit:    1. Lumbar facet arthropathy (Primary)  -     methylPREDNISolone (MEDROL) 4 MG dose pack; Take as directed on package instructions.  Dispense: 21 tablet; Refill: 0  -     Ibuprofen 3 %, Gabapentin 10 %, Baclofen 2 %, lidocaine 4 %, Ketamine HCl 4 %; Apply 1-2 g topically to the appropriate area as directed 3 (Three) to 4 (Four) times daily.  Dispense: 90 g; Refill: 0    2. Degeneration of intervertebral disc of lumbar region with discogenic back pain and lower extremity pain  -     methylPREDNISolone (MEDROL) 4 MG dose pack; Take as directed on package instructions.  Dispense: 21 tablet; Refill: 0  -     Ibuprofen 3 %, Gabapentin 10 %, Baclofen 2 %, lidocaine 4 %, Ketamine HCl 4 %; Apply 1-2 g topically to the appropriate area as directed 3 (Three) to 4 (Four) times daily.  " "Dispense: 90 g; Refill: 0    3. Lumbar radiculopathy      Ro Perez reports a pain score of 6.  Given her pain assessment as noted, treatment options were discussed and the following options were decided upon as a follow-up plan to address the patient's pain: continuation of current treatment plan for pain and prescription for non-opiod analgesics.    --- Proceed with Bilateral L3-L5 MBB - scheduled on 4/22/25 with Dr. Moreira  -------  Education about Medial Branch Blockade and RF Therapy:    This medial branch blockade (MBB) suggested is intended for diagnostic purposes, with the intent of offering the patient Radiofrequency thermal rhizotomy (RF) if the MBB is diagnostically effective.  The diagnostic blockade is necessary to determine the likelihood that RF therapy could be efficacious in providing long term relief to the patient.    Medial branches are sensory nerve branches that connect to a facet joint and transmit sensations & pain signals from that joint.  Facet is a term for the type of joints found in the spine.  Medial branches are the nerves that go to a facet, and therefore are also sometimes called \"facet joint nerves\" (FJNs).      In a medial branch blockade procedure, xray fluoroscopy is used to verify the locations of the outside of the joint lines which are being targeted.  Under xray guidance, needles are placed to these areas.  Contrast dye is injected to confirm proper placement, with dye flowing over the joint area, and to ensure that the dye does not flow into unintended areas such as a vein.  When this is confirmed, local anesthetic is injected to block the medial branch at that joint level.      If MBBs are diagnostically successful in blocking pain, then the patient is most likely a great candidate for Radiofrequency of those facet joint nerves.  In the RF procedure, needles are placed to the joint lines in the same fashion, and after testing, the needle tips are heated to thermally " treat the nerves, blocking the nerves by in essence damaging the nerves with the heat treatment(non-pulsed).       Medically, a successful RF procedure should provide a patient with 50% pain relief or more for at least 6 months.  Clinical experience suggests that successful patients receive relief more in the range of 12 months on average.  We also discussed that a fortunate minority of patients receive therapeutic success from the MBB, and may not require RF ablation.  If a patient receives more than 8 weeks of relief from MBB, then occasional repeat MBB for therapeutic purposes is a very reasonable alternative therapy.  This course of therapy is consistent with our LCDs according to our CMS  in the area, and therefore other insurance providers should follow accordingly.  We will monitor our patients to screen for these therapeutic responders and will offer RF therapy only when necessary.      We discussed that MBB & RF are not without risks.  Guidelines regarding anticoagulant use & neuraxial procedures will be respected.  Patients that are ill or otherwise may be at risk for sepsis will not have their spines accessed by neuraxial injections of any type.  This patient will not be offered these therapies if there is an increased risk.   We discussed that there is a risk of postprocedural pain and also a risk of worsening of clinical picture with these procedures as with any neuraxial procedure.    -------    --- The urine drug screen confirmation from 3/3/25 has been reviewed and the result is appropriate based on patient history and MANJULA report  --- Continue Pregabalin. No refill needed at this time.  --- Trial compound pain cream to see if this is beneficial for right anterior thigh pain. Prescription sent to [facility].   --- MDP to pharmacy to have on hand for upcoming vacation in case pain were to flare   --- Follow-up for procedure - 4/28/25    Diagnostic Facet Joint Procedure:   MBB   The  confirmatory diagnostic facet joint procedure is considered medically reasonable and necessary for the diagnosis and treatment of chronic pain for this patient due to the patient meeting all of the following criteria:    - 1. Moderate to severe chronic neck or low back pain, predominantly axial, that causes functional deficit measured on pain or disability scale.  - 2. Pain present for minimum of 3 months with documented failure to respond to noninvasive conservative management (as tolerated)  - 3. Absence of untreated radiculopathy or neurogenic claudication (except for radiculopathy caused by facet joint synovial cyst)  - 4. There is no non-facet pathology per clinical assessment or radiology studies that could explain the source of the patient’s pain, including but not limited to fracture, tumor, infection, or significant deformity.    The patient has also shown a consistent positive response of at least 80% relief of primary (index) pain (with the duration of relief being consistent with the agent used).     MANJULA REPORT  As part of the patient's treatment plan, I am prescribing controlled substances. The patient has been made aware of appropriate use of such medications, including potential risk of somnolence, limited ability to drive and/or work safely, and the potential for dependence or overdose. It has also been made clear that these medications are for use by this patient only, without concomitant use of alcohol or other substances unless prescribed.     Patient has completed prescribing agreement detailing terms of continued prescribing of controlled substances, including monitoring MANJULA reports, urine drug screening, and pill counts if necessary. The patient is aware that inappropriate use will results in cessation of prescribing such medications.    As the clinician, I personally reviewed the MANJULA from 3/31/25 while the patient was in the office today.    History and physical exam exhibit continued  safe and appropriate use of controlled substances.    Dictated utilizing Dragon dictation.

## 2025-04-14 RX ORDER — ALBUTEROL SULFATE 90 UG/1
2 INHALANT RESPIRATORY (INHALATION) EVERY 4 HOURS PRN
Qty: 6.7 G | Refills: 3 | Status: SHIPPED | OUTPATIENT
Start: 2025-04-14

## 2025-04-14 NOTE — TELEPHONE ENCOUNTER
Rx Refill Note  Requested Prescriptions     Pending Prescriptions Disp Refills    albuterol sulfate  (90 Base) MCG/ACT inhaler [Pharmacy Med Name: ALBUTEROL HFA (PROVENTIL) INH]  8     Sig: INHALE 2 PUFFS BY MOUTH EVERY 4 HOURS AS NEEDED FOR WHEEZE      Last office visit with prescribing clinician: 2/10/2025   Last telemedicine visit with prescribing clinician: Visit date not found   Next office visit with prescribing clinician: 5/12/2025                         Would you like a call back once the refill request has been completed: [] Yes [] No    If the office needs to give you a call back, can they leave a voicemail: [] Yes [] No    Meryl Lucio MA  04/14/25, 09:02 EDT

## 2025-04-22 ENCOUNTER — HOSPITAL ENCOUNTER (OUTPATIENT)
Facility: SURGERY CENTER | Age: 65
Setting detail: HOSPITAL OUTPATIENT SURGERY
Discharge: HOME | End: 2025-04-22
Attending: ANESTHESIOLOGY | Admitting: ANESTHESIOLOGY
Payer: COMMERCIAL

## 2025-04-22 ENCOUNTER — HOSPITAL ENCOUNTER (OUTPATIENT)
Dept: GENERAL RADIOLOGY | Facility: SURGERY CENTER | Age: 65
End: 2025-04-22
Payer: COMMERCIAL

## 2025-04-22 VITALS
HEIGHT: 67 IN | OXYGEN SATURATION: 96 % | DIASTOLIC BLOOD PRESSURE: 67 MMHG | TEMPERATURE: 98.4 F | BODY MASS INDEX: 26.68 KG/M2 | SYSTOLIC BLOOD PRESSURE: 112 MMHG | RESPIRATION RATE: 16 BRPM | HEART RATE: 80 BPM | WEIGHT: 170 LBS

## 2025-04-22 DIAGNOSIS — M51.362 DEGENERATION OF INTERVERTEBRAL DISC OF LUMBAR REGION WITH DISCOGENIC BACK PAIN AND LOWER EXTREMITY PAIN: ICD-10-CM

## 2025-04-22 DIAGNOSIS — Z41.9 SURGERY, ELECTIVE: ICD-10-CM

## 2025-04-22 DIAGNOSIS — M47.816 LUMBAR FACET ARTHROPATHY: ICD-10-CM

## 2025-04-22 DIAGNOSIS — G89.29 CHRONIC LOW BACK PAIN, UNSPECIFIED BACK PAIN LATERALITY, UNSPECIFIED WHETHER SCIATICA PRESENT: ICD-10-CM

## 2025-04-22 DIAGNOSIS — M54.50 CHRONIC LOW BACK PAIN, UNSPECIFIED BACK PAIN LATERALITY, UNSPECIFIED WHETHER SCIATICA PRESENT: ICD-10-CM

## 2025-04-22 LAB — GLUCOSE BLDC GLUCOMTR-MCNC: 123 MG/DL (ref 70–130)

## 2025-04-22 PROCEDURE — 25510000001 IOPAMIDOL 61 % SOLUTION 30 ML VIAL: Performed by: ANESTHESIOLOGY

## 2025-04-22 PROCEDURE — 64493 INJ PARAVERT F JNT L/S 1 LEV: CPT | Performed by: ANESTHESIOLOGY

## 2025-04-22 PROCEDURE — 64494 INJ PARAVERT F JNT L/S 2 LEV: CPT | Performed by: ANESTHESIOLOGY

## 2025-04-22 PROCEDURE — 25010000002 BUPIVACAINE (PF) 0.5 % SOLUTION 10 ML VIAL: Performed by: ANESTHESIOLOGY

## 2025-04-22 PROCEDURE — 25010000002 LIDOCAINE PF 1% 1 % SOLUTION 5 ML VIAL: Performed by: ANESTHESIOLOGY

## 2025-04-22 PROCEDURE — 77002 NEEDLE LOCALIZATION BY XRAY: CPT

## 2025-04-22 PROCEDURE — 76000 FLUOROSCOPY <1 HR PHYS/QHP: CPT

## 2025-04-22 NOTE — DISCHARGE INSTRUCTIONS
[facility] - Post-procedure Instructions          --  While there are no absolute restrictions, it is recommended that you do not perform strenuous activity today. In the morning, you may resume your level of activity as before your block.    --  If you have a band-aid at your injection site, please remove it later today. Observe the area for any redness, swelling, pus-like drainage, or a temperature over 101°. If any of these symptoms occur, please call your doctor at [phone]. If after office hours, leave a message and the on-call provider will return your call.    --  Ice may be applied to your injection site. It is recommended you avoid direct heat (heating pad; hot tub) for 1-2 days.    --  Call [facility] at [phone] if you experience persistent headache, persistent bleeding from the injection site, or severe pain not relieved by heat or oral medication.    --  Do not make important decisions today.    --  Due to the effects of the block and/or the I.V. Sedation, DO NOT drive or operate hazardous machinery for 12 hours.  Local anesthetics may cause numbness after procedure and precautions must be taken with regards to operating equipment as well as with walking, even if ambulating with assistance of another person or with an assistive device.    --  Do not drink alcohol for 12 hours.    -- You may return to work tomorrow, or as directed by your referring doctor.    --  Occasionally you may notice a slight increase in your pain after the procedure. This should start to improve within the next 24-48 hours. Radiofrequency ablation procedure pain may last 3-4 weeks.    --  It may take as long as 3-4 days before you notice a gradual improvement in your pain and/or other symptoms.    -- You may continue to take your prescribed pain medication as needed.    --  Some normal possible side effects of steroid use could include fluid retention, increased blood sugar, dull headache,  increased sweating, increased appetite, mood swings and flushing.    --  Diabetics are recommended to watch their blood glucose level closely for 24-48 hours after the injection.    --  Must stay in PACU for 20 min upon arrival and prove no leg weakness before being discharged.    --  IN THE EVENT OF A LIFE THREATENING EMERGENCY, (CHEST PAIN, BREATHING DIFFICULTIES, PARALYSIS…) YOU SHOULD GO TO YOUR NEAREST EMERGENCY ROOM.    --  You should be contacted by our office within 2-3 days to schedule follow up or next appointment date.  If not contacted within 7 days, please call the office at [phone]

## 2025-04-22 NOTE — OP NOTE
Bilateral L3-5 Lumbar Medial Branch Blockade  [facility]      PREOPERATIVE DIAGNOSIS:  Lumbar spondylosis without myelopathy    POSTOPERATIVE DIAGNOSIS:  Lumbar spondylosis without myelopathy    PROCEDURE:   Diagnostic Bilateral Lumbar Medial Branch Nerve Blockades, with fluoroscopy:  L3, L4, and L5 nerves (at the L4 & L5 transverse processes and the sacral alar groove) to block facet joints L4-5, and L5-S1  93748-85 -- Bilateral Lumbar Facet blocks, 1st Level  16751-78 -- Bilateral Lumbar Facet blocks, 2nd  Level    PRE-PROCEDURE DISCUSSION WITH PATIENT:    Risks and complications were discussed with the patient prior to starting the procedure and informed consent was obtained.      SURGEON:  Raul Moreira MD    REASON FOR PROCEDURE:    The patient complains of pain that seems to have a significant axial component and Previous diagnostic positivity of a Lumbar Medial Branch Blockade at the same levels    SEDATION:  Patient declined administration of moderate sedation    ANESTHETIC:  Marcaine 0.5%  STEROID:  NONE  TOTAL VOLUME OF SOLUTION:  6ml    DESCRIPTON OF PROCEDURE:  After obtaining informed consent, IV access was not obtained in the preoperative area.   The patient was taken to the operating room.  The patient was placed in the prone position with a pillow under the abdomen. All pressure points were well padded.  EKG, blood pressure, and pulse oximeter were monitored.  The patient was monitored and sedated by the RN under my direction. The lumbosacral area was prepped with Chloraprep and draped in a sterile fashion. Under fluoroscopic guidance the transverse processes of the L4 and L5 vertebrae at the junctions of the superior articular processes were identified on the right. Also identified was the groove between the ala and the superior articular process of the sacrum on the ipsilateral side.  Skin and subcutaneous tissue were anesthetized with 1% lidocaine above each of these points.  A 22-gauge spinal needle was introduced under fluoroscopic guidance at the above junctions. Aspiration was negative for blood and CSF.  After confirming the position of the needle with fluoroscope in all views, 0.25 mL of Omnipaque was injected, and after seeing the proper spread a total of 1 mL of the anesthetic solution noted above was injected at each of these points.  Needles were removed intact from each of the areas.  A similar procedure was repeated to block the L3, L4, and L5 nerves on the contralateral side.   Onset of analgesia was noted.  Vital signs remained stable throughout.      ESTIMATED BLOOD LOSS:  <5 mL  SPECIMENS:  none    COMPLICATIONS:   No complications were noted., There was no indication of vascular uptake on live injection of contrast dye., and There was no indication of intrathecal uptake on live injection of contrast dye.    TOLERANCE & DISCHARGE CONDITION:    The patient tolerated the procedure well.  The patient was transported to the recovery area without difficulties.  The patient was discharged to home under the care of family in stable and satisfactory condition.    PLAN OF CARE:  The patient was given our standard instruction sheet.  We discussed that Lumbar Medial Branch Blockade is a diagnostic procedure in consideration for radiofrequency ablation if two diagnostic procedures prove to be positive for significant benefit.  If sustained relief of 6 to eight weeks is obtained, then an alternative plan could be therapeutic lumbar branch blockades.  The patient is asked to keep a pain log each hour for 8 hours after the procedure today.  The patient will  Return to clinic 1 wk.  The patient will resume all medications as per the medication reconciliation sheet.

## 2025-04-28 ENCOUNTER — OFFICE VISIT (OUTPATIENT)
Dept: PAIN MEDICINE | Facility: CLINIC | Age: 65
End: 2025-04-28
Payer: COMMERCIAL

## 2025-04-28 ENCOUNTER — PREP FOR SURGERY (OUTPATIENT)
Dept: SURGERY | Facility: SURGERY CENTER | Age: 65
End: 2025-04-28
Payer: COMMERCIAL

## 2025-04-28 ENCOUNTER — TRANSCRIBE ORDERS (OUTPATIENT)
Dept: SURGERY | Facility: SURGERY CENTER | Age: 65
End: 2025-04-28
Payer: COMMERCIAL

## 2025-04-28 VITALS
SYSTOLIC BLOOD PRESSURE: 136 MMHG | DIASTOLIC BLOOD PRESSURE: 80 MMHG | RESPIRATION RATE: 18 BRPM | BODY MASS INDEX: 27.44 KG/M2 | HEART RATE: 77 BPM | HEIGHT: 67 IN | WEIGHT: 174.8 LBS | TEMPERATURE: 97.5 F

## 2025-04-28 DIAGNOSIS — M54.16 LUMBAR RADICULOPATHY: ICD-10-CM

## 2025-04-28 DIAGNOSIS — M47.816 LUMBAR FACET ARTHROPATHY: ICD-10-CM

## 2025-04-28 DIAGNOSIS — M54.50 CHRONIC LOW BACK PAIN, UNSPECIFIED BACK PAIN LATERALITY, UNSPECIFIED WHETHER SCIATICA PRESENT: Primary | ICD-10-CM

## 2025-04-28 DIAGNOSIS — M51.362 DEGENERATION OF INTERVERTEBRAL DISC OF LUMBAR REGION WITH DISCOGENIC BACK PAIN AND LOWER EXTREMITY PAIN: ICD-10-CM

## 2025-04-28 DIAGNOSIS — G89.29 CHRONIC LOW BACK PAIN, UNSPECIFIED BACK PAIN LATERALITY, UNSPECIFIED WHETHER SCIATICA PRESENT: Primary | ICD-10-CM

## 2025-04-28 DIAGNOSIS — Z41.9 SURGERY, ELECTIVE: Primary | ICD-10-CM

## 2025-04-28 PROCEDURE — 99214 OFFICE O/P EST MOD 30 MIN: CPT

## 2025-04-28 RX ORDER — PREGABALIN 150 MG/1
150 CAPSULE ORAL 2 TIMES DAILY
Qty: 60 CAPSULE | Refills: 2 | Status: SHIPPED | OUTPATIENT
Start: 2025-04-28

## 2025-04-28 NOTE — PROGRESS NOTES
CHIEF COMPLAINT  Back pain     Subjective   Mehnazrosetta Perez is a 65 y.o. female  who presents to the office for follow-up of procedure.  She completed a Bilateral L3-5 MBB on 4/22/2025 performed by Dr. Moreira for management of back pain. Patient reports 90% relief from the procedure x 8 hours. She reports that she was able to do household chores such as laundry, taking out the trash, and coking without experiencing increased pain.     Today pain is 5/10VAS in severity (severity in pain varies based on activity level). She continues to complain of axial low back pain that radiates across her low back in a bandlike pattern. Pain radiates into right anterior thigh stopping below the knee. She contributes 75% of pain to her back and 25% to her legs. Describes this pain as an intermittent aching, burning, and shooting. Pain is worsened by walking long distances, prolonged sitting or standing, rising from sitting to standing position, and bending/twisting. Pain improve with rest/reposition, and heat.  Mixed results with PT in the past.      She takes Pregabalin 150mg daily (unable to BID due to drowsiness) with minimal relief. She reports some drowsiness with medication. Gabapentin caused dizziness. She continues with OTC Tylenol or Ibuprofen and Cymbalta 60mg BID (prescribed by PCP). She was prescribed compound pain cream at her last office visit which she reports has been helpful to her right thigh pain.     Patient works at [employer].     Procedures:  4/22/25 - Bilateral L3-L5 MBB - 90% relief x 8 hours  3/25/25 - Bilateral L3-L5 MBB - 95% relief x 8 hours  2/4/25 - Bilateral L4 TF LESI - no relief   11/20/24 - Left L3 & L4 TF LESI - 50% relief for 3 months   8/6/24 - Left L3 & L4 TF LESI - 50% relief x 3 months  6/12/24 - L3-L4 LESI - 20% ongoing relief   2/20/24 - L3-L4 LESI - 100% x 2.5 months  11/1/23 - L3-L4 LESI - 100% relief x 2.5 months    Back Pain  This is a chronic problem. The current episode started more than 1  year ago. The problem occurs intermittently. The problem has been comes and goes since onset. The pain is present in the lumbar spine. The quality of the pain is described as shooting, aching and burning. The pain does not radiate (left anterior thigh and left lateral leg into ankle). The pain is at a severity of 5/10 (severity in pain varies based on activity level). The pain is moderate. The pain is The same all the time. The symptoms are aggravated by position, sitting and standing (walking long distances,). Associated symptoms include weakness (right leg). Pertinent negatives include no abdominal pain, chest pain, dysuria, fever, headaches or numbness. She has tried chiropractic manipulation, heat and NSAIDs (PT,) for the symptoms. The treatment provided mild relief.      PEG Assessment   What number best describes your pain on average in the past week?5  What number best describes how, during the past week, pain has interfered with your enjoyment of life?6  What number best describes how, during the past week, pain has interfered with your general activity?  5    Review of Pertinent Medical Data ---      The following portions of the patient's history were reviewed and updated as appropriate: allergies, current medications, past family history, past medical history, past social history, past surgical history, and problem list.    Review of Systems   Constitutional:  Negative for fever.   Cardiovascular:  Negative for chest pain.   Gastrointestinal:  Negative for abdominal pain, constipation and diarrhea.   Genitourinary:  Negative for difficulty urinating and dysuria.   Musculoskeletal:  Positive for back pain.   Neurological:  Positive for weakness (right leg). Negative for numbness and headaches.   Psychiatric/Behavioral:  Negative for sleep disturbance and suicidal ideas. The patient is not nervous/anxious.      I have reviewed and confirmed the accuracy of the ROS as documented by the MA/LPN/RN Jeri JESUS  "VINH García    Vitals:    04/28/25 0924   BP: 136/80   Pulse: 77   Resp: 18   Temp: 97.5 °F (36.4 °C)   Weight: 79.3 kg (174 lb 12.8 oz)   Height: 170.2 cm (67\")   PainSc: 5    PainLoc: Back     Objective   Physical Exam  Constitutional:       Appearance: Normal appearance.   HENT:      Head: Normocephalic.   Cardiovascular:      Rate and Rhythm: Normal rate and regular rhythm.   Pulmonary:      Effort: Pulmonary effort is normal.      Breath sounds: Normal breath sounds.   Musculoskeletal:      Cervical back: Normal range of motion.      Lumbar back: Tenderness present. Decreased range of motion. Positive right straight leg raise test and positive left straight leg raise test.      Left hip: Tenderness present.      Comments: + lumbar facet loading/tenderness   Skin:     General: Skin is warm and dry.      Capillary Refill: Capillary refill takes less than 2 seconds.   Neurological:      General: No focal deficit present.      Mental Status: She is alert and oriented to person, place, and time.   Psychiatric:         Mood and Affect: Mood normal.         Behavior: Behavior normal.         Thought Content: Thought content normal.         Cognition and Memory: Cognition normal.       Assessment & Plan   Diagnoses and all orders for this visit:    1. Lumbar facet arthropathy  -     Ibuprofen 3 %, Gabapentin 10 %, Baclofen 2 %, lidocaine 4 %, Ketamine HCl 4 %; Apply 1-2 g topically to the appropriate area as directed 3 (Three) to 4 (Four) times daily.  Dispense: 90 g; Refill: 0    2. Degeneration of intervertebral disc of lumbar region with discogenic back pain and lower extremity pain  -     Ibuprofen 3 %, Gabapentin 10 %, Baclofen 2 %, lidocaine 4 %, Ketamine HCl 4 %; Apply 1-2 g topically to the appropriate area as directed 3 (Three) to 4 (Four) times daily.  Dispense: 90 g; Refill: 0    3. Lumbar radiculopathy  -     pregabalin (LYRICA) 150 MG capsule; Take 1 capsule by mouth 2 (Two) Times a Day.  Dispense: 60 " "capsule; Refill: 2      Ro Perez reports a pain score of 5.  Given her pain assessment as noted, treatment options were discussed and the following options were decided upon as a follow-up plan to address the patient's pain: continuation of current treatment plan for pain and prescription for non-opiod analgesics.    --- Bilateral L3-L5 RFA - patient declined the need for sedation with this procedure   -------  Education about Medial Branch Blockade and RF Therapy:  This medial branch blockade (MBB) suggested is intended for diagnostic purposes, with the intent of offering the patient Radiofrequency thermal rhizotomy (RF) if the MBB is diagnostically effective.  The diagnostic blockade is necessary to determine the likelihood that RF therapy could be efficacious in providing long term relief to the patient.    Medial branches are sensory nerve branches that connect to a facet joint and transmit sensations & pain signals from that joint.  Facet is a term for the type of joints found in the spine.  Medial branches are the nerves that go to a facet, and therefore are also sometimes called \"facet joint nerves\" (FJNs).      In a medial branch blockade procedure, xray fluoroscopy is used to verify the locations of the outside of the joint lines which are being targeted.  Under xray guidance, needles are placed to these areas.  Contrast dye is injected to confirm proper placement, with dye flowing over the joint area, and to ensure that the dye does not flow into unintended areas such as a vein.  When this is confirmed, local anesthetic is injected to block the medial branch at that joint level.      If MBBs are diagnostically successful in blocking pain, then the patient is most likely a great candidate for Radiofrequency of those facet joint nerves.  In the RF procedure, needles are placed to the joint lines in the same fashion, and after testing, the needle tips are heated to thermally treat the nerves, blocking the " nerves by in essence damaging the nerves with the heat treatment(non-pulsed).       Medically, a successful RF procedure should provide a patient with 50% pain relief or more for at least 6 months.  Clinical experience suggests that successful patients receive relief more in the range of 12 months on average.  We also discussed that a fortunate minority of patients receive therapeutic success from the MBB, and may not require RF ablation.  If a patient receives more than 8 weeks of relief from MBB, then occasional repeat MBB for therapeutic purposes is a very reasonable alternative therapy.  This course of therapy is consistent with our LCDs according to our CMS  in the area, and therefore other insurance providers should follow accordingly.  We will monitor our patients to screen for these therapeutic responders and will offer RF therapy only when necessary.      We discussed that MBB & RF are not without risks.  Guidelines regarding anticoagulant use & neuraxial procedures will be respected.  Patients that are ill or otherwise may be at risk for sepsis will not have their spines accessed by neuraxial injections of any type.  This patient will not be offered these therapies if there is an increased risk.   We discussed that there is a risk of postprocedural pain and also a risk of worsening of clinical picture with these procedures as with any neuraxial procedure.    -------  history and MANJULA report    --- The urine drug screen confirmation from 3/3/25 has been reviewed and the result is appropriate based on patient history and MANJULA report   --- Continue Pregabalin. Refill sent to pharmacy.   --- Continue compound pain cream. Refill sent to [facility].   --- Follow-up for procedure - 4/28/25    Thermal Radiofrequency Destruction  This initial thermal radiofrequency destruction (RFA) of cervical, thoracic, or lumbar paravertebral facet joint (medial branch) nerves is considered medically  reasonable and necessary as this patient has met the criteria of having at least two (2) medically reasonable and necessary diagnostic MBBs, with each one providing a consistent minimum of 80% sustained relief of primary (index) pain (with the duration of relief being consistent with the agent used).       MANJULA REPORT  As part of the patient's treatment plan, I am prescribing controlled substances. The patient has been made aware of appropriate use of such medications, including potential risk of somnolence, limited ability to drive and/or work safely, and the potential for dependence or overdose. It has also been made clear that these medications are for use by this patient only, without concomitant use of alcohol or other substances unless prescribed.     Patient has completed prescribing agreement detailing terms of continued prescribing of controlled substances, including monitoring MANJULA reports, urine drug screening, and pill counts if necessary. The patient is aware that inappropriate use will results in cessation of prescribing such medications.    As the clinician, I personally reviewed the MANJULA from 4/28/25 while the patient was in the office today.    History and physical exam exhibit continued safe and appropriate use of controlled substances.    Dictated utilizing Dragon dictation.

## 2025-04-28 NOTE — PATIENT INSTRUCTIONS
"-------  Education about Medial Branch Blockade and RF Therapy:    This medial branch blockade (MBB) suggested is intended for diagnostic purposes, with the intent of offering the patient Radiofrequency thermal rhizotomy (RF) if the MBB is diagnostically effective.  The diagnostic blockade is necessary to determine the likelihood that RF therapy could be efficacious in providing long term relief to the patient.    Medial branches are sensory nerve branches that connect to a facet joint and transmit sensations & pain signals from that joint.  Facet is a term for the type of joints found in the spine.  Medial branches are the nerves that go to a facet, and therefore are also sometimes called \"facet joint nerves\" (FJNs).      In a medial branch blockade procedure, xray fluoroscopy is used to verify the locations of the outside of the joint lines which are being targeted.  Under xray guidance, needles are placed to these areas.  Contrast dye is injected to confirm proper placement, with dye flowing over the joint area, and to ensure that the dye does not flow into unintended areas such as a vein.  When this is confirmed, local anesthetic is injected to block the medial branch at that joint level.      If MBBs are diagnostically successful in blocking pain, then the patient is most likely a great candidate for Radiofrequency of those facet joint nerves.  In the RF procedure, needles are placed to the joint lines in the same fashion, and after testing, the needle tips are heated to thermally treat the nerves, blocking the nerves by in essence damaging the nerves with the heat treatment(non-pulsed).       Medically, a successful RF procedure should provide a patient with 50% pain relief or more for at least 6 months.  Clinical experience suggests that successful patients receive relief more in the range of 12 months on average.  We also discussed that a fortunate minority of patients receive therapeutic success from the " MBB, and may not require RF ablation.  If a patient receives more than 8 weeks of relief from MBB, then occasional repeat MBB for therapeutic purposes is a very reasonable alternative therapy.  This course of therapy is consistent with our LCDs according to our CMS  in the area, and therefore other insurance providers should follow accordingly.  We will monitor our patients to screen for these therapeutic responders and will offer RF therapy only when necessary.        We discussed that MBB & RF are not without risks.  Guidelines regarding anticoagulant use & neuraxial procedures will be respected.  Patients that are ill or otherwise may be at risk for sepsis will not have their spines accessed by neuraxial injections of any type.  This patient will not be offered these therapies if there is an increased risk.   We discussed that there is a risk of postprocedural pain and also a risk of worsening of clinical picture with these procedures as with any neuraxial procedure.    -------    Sonny

## 2025-05-01 DIAGNOSIS — Z00.00 HEALTH MAINTENANCE EXAMINATION: ICD-10-CM

## 2025-05-01 DIAGNOSIS — E66.09 CLASS 1 OBESITY DUE TO EXCESS CALORIES WITH SERIOUS COMORBIDITY AND BODY MASS INDEX (BMI) OF 33.0 TO 33.9 IN ADULT: ICD-10-CM

## 2025-05-01 DIAGNOSIS — E78.2 MIXED HYPERLIPIDEMIA: ICD-10-CM

## 2025-05-01 DIAGNOSIS — E66.811 CLASS 1 OBESITY DUE TO EXCESS CALORIES WITH SERIOUS COMORBIDITY AND BODY MASS INDEX (BMI) OF 33.0 TO 33.9 IN ADULT: ICD-10-CM

## 2025-05-01 DIAGNOSIS — R73.03 PREDIABETES: ICD-10-CM

## 2025-05-05 DIAGNOSIS — Z11.59 NEED FOR HEPATITIS C SCREENING TEST: ICD-10-CM

## 2025-05-06 LAB
ALBUMIN SERPL-MCNC: 4.5 G/DL (ref 3.5–5.2)
ALBUMIN/GLOB SERPL: 1.8 G/DL
ALP SERPL-CCNC: 102 U/L (ref 39–117)
ALT SERPL-CCNC: 9 U/L (ref 1–33)
APPEARANCE UR: CLEAR
AST SERPL-CCNC: 15 U/L (ref 1–32)
BACTERIA #/AREA URNS HPF: ABNORMAL /HPF
BASOPHILS # BLD AUTO: 0.08 10*3/MM3 (ref 0–0.2)
BASOPHILS NFR BLD AUTO: 0.8 % (ref 0–1.5)
BILIRUB SERPL-MCNC: <0.2 MG/DL (ref 0–1.2)
BILIRUB UR QL STRIP: NEGATIVE
BUN SERPL-MCNC: 16 MG/DL (ref 8–23)
BUN/CREAT SERPL: 16 (ref 7–25)
CALCIUM SERPL-MCNC: 9.6 MG/DL (ref 8.6–10.5)
CASTS URNS MICRO: ABNORMAL
CHLORIDE SERPL-SCNC: 103 MMOL/L (ref 98–107)
CHOLEST SERPL-MCNC: 180 MG/DL (ref 0–200)
CO2 SERPL-SCNC: 23.6 MMOL/L (ref 22–29)
COLOR UR: ABNORMAL
CREAT SERPL-MCNC: 1 MG/DL (ref 0.57–1)
CRYSTALS URNS MICRO: ABNORMAL
EGFRCR SERPLBLD CKD-EPI 2021: 62.3 ML/MIN/1.73
EOSINOPHIL # BLD AUTO: 0.27 10*3/MM3 (ref 0–0.4)
EOSINOPHIL NFR BLD AUTO: 2.9 % (ref 0.3–6.2)
EPI CELLS #/AREA URNS HPF: ABNORMAL /HPF
ERYTHROCYTE [DISTWIDTH] IN BLOOD BY AUTOMATED COUNT: 12.5 % (ref 12.3–15.4)
GLOBULIN SER CALC-MCNC: 2.5 GM/DL
GLUCOSE SERPL-MCNC: 77 MG/DL (ref 65–99)
GLUCOSE UR QL STRIP: NEGATIVE
HBA1C MFR BLD: 5.3 % (ref 4.8–5.6)
HCT VFR BLD AUTO: 42.5 % (ref 34–46.6)
HCV IGG SERPL QL IA: NON REACTIVE
HDLC SERPL-MCNC: 39 MG/DL (ref 40–60)
HGB BLD-MCNC: 13.7 G/DL (ref 12–15.9)
HGB UR QL STRIP: NEGATIVE
IMM GRANULOCYTES # BLD AUTO: 0.06 10*3/MM3 (ref 0–0.05)
IMM GRANULOCYTES NFR BLD AUTO: 0.6 % (ref 0–0.5)
KETONES UR QL STRIP: ABNORMAL
LDLC SERPL CALC-MCNC: 92 MG/DL (ref 0–100)
LDLC/HDLC SERPL: 2.11 {RATIO}
LEUKOCYTE ESTERASE UR QL STRIP: ABNORMAL
LYMPHOCYTES # BLD AUTO: 3 10*3/MM3 (ref 0.7–3.1)
LYMPHOCYTES NFR BLD AUTO: 31.7 % (ref 19.6–45.3)
MCH RBC QN AUTO: 29 PG (ref 26.6–33)
MCHC RBC AUTO-ENTMCNC: 32.2 G/DL (ref 31.5–35.7)
MCV RBC AUTO: 90 FL (ref 79–97)
MONOCYTES # BLD AUTO: 0.72 10*3/MM3 (ref 0.1–0.9)
MONOCYTES NFR BLD AUTO: 7.6 % (ref 5–12)
NEUTROPHILS # BLD AUTO: 5.34 10*3/MM3 (ref 1.7–7)
NEUTROPHILS NFR BLD AUTO: 56.4 % (ref 42.7–76)
NITRITE UR QL STRIP: POSITIVE
NRBC BLD AUTO-RTO: 0 /100 WBC (ref 0–0.2)
PH UR STRIP: 6 [PH] (ref 5–8)
PLATELET # BLD AUTO: 365 10*3/MM3 (ref 140–450)
POTASSIUM SERPL-SCNC: 4.3 MMOL/L (ref 3.5–5.2)
PROT SERPL-MCNC: 7 G/DL (ref 6–8.5)
PROT UR QL STRIP: ABNORMAL
RBC # BLD AUTO: 4.72 10*6/MM3 (ref 3.77–5.28)
RBC #/AREA URNS HPF: ABNORMAL /HPF
SODIUM SERPL-SCNC: 138 MMOL/L (ref 136–145)
SP GR UR STRIP: 1.03 (ref 1–1.03)
TRIGL SERPL-MCNC: 293 MG/DL (ref 0–150)
TSH SERPL DL<=0.005 MIU/L-ACNC: 3.07 UIU/ML (ref 0.27–4.2)
UROBILINOGEN UR STRIP-MCNC: ABNORMAL MG/DL
VLDLC SERPL CALC-MCNC: 49 MG/DL (ref 5–40)
WBC # BLD AUTO: 9.47 10*3/MM3 (ref 3.4–10.8)
WBC #/AREA URNS HPF: ABNORMAL /HPF

## 2025-05-06 RX ORDER — NITROFURANTOIN 25; 75 MG/1; MG/1
100 CAPSULE ORAL 2 TIMES DAILY
Qty: 6 CAPSULE | Refills: 0 | Status: SHIPPED | OUTPATIENT
Start: 2025-05-06 | End: 2025-05-09

## 2025-05-12 ENCOUNTER — OFFICE VISIT (OUTPATIENT)
Dept: FAMILY MEDICINE CLINIC | Facility: CLINIC | Age: 65
End: 2025-05-12
Payer: COMMERCIAL

## 2025-05-12 VITALS
DIASTOLIC BLOOD PRESSURE: 66 MMHG | WEIGHT: 173.4 LBS | TEMPERATURE: 97.3 F | BODY MASS INDEX: 27.21 KG/M2 | HEART RATE: 92 BPM | HEIGHT: 67 IN | SYSTOLIC BLOOD PRESSURE: 140 MMHG

## 2025-05-12 DIAGNOSIS — E66.811 CLASS 1 OBESITY DUE TO EXCESS CALORIES WITH SERIOUS COMORBIDITY AND BODY MASS INDEX (BMI) OF 34.0 TO 34.9 IN ADULT: Primary | ICD-10-CM

## 2025-05-12 DIAGNOSIS — Z79.899 HIGH RISK MEDICATION USE: ICD-10-CM

## 2025-05-12 DIAGNOSIS — E66.09 CLASS 1 OBESITY DUE TO EXCESS CALORIES WITH SERIOUS COMORBIDITY AND BODY MASS INDEX (BMI) OF 34.0 TO 34.9 IN ADULT: Primary | ICD-10-CM

## 2025-05-12 PROCEDURE — 99213 OFFICE O/P EST LOW 20 MIN: CPT | Performed by: NURSE PRACTITIONER

## 2025-05-12 NOTE — PATIENT INSTRUCTIONS
Discharge instructions continue present plan you are doing excellent  Continue slow steady changes  For some reason if he can keep your medicine, send a message to me make sure I am responding personally with an answer I will see back in 3 months otherwise, with 90-day prescription, here for the refill

## 2025-05-12 NOTE — PROGRESS NOTES
"Chief Complaint  Weight Loss    Subjective        MehnazLiseth Perez presents to [facility]  History of Present Illness  Very pleasant patient here today for follow-up, obesity with great changes metabolically with great effort and some new tools, Qsymia has been helping but unfortunately she did not get a refill she was not sure why insurance suggested it was me but I did not get the refill request and believe  She was up-to-date I would have refilled this for her  She is still motivated no chest pain shortness of breath no difficulty with medication wants to continue, she has a physical due in July or so    Weight Loss    Objective   Vital Signs:  /66 (BP Location: Left arm, Patient Position: Sitting, Cuff Size: Adult)   Pulse 92   Temp 97.3 °F (36.3 °C) (Temporal)   Ht 170.2 cm (67\")   Wt 78.7 kg (173 lb 6.4 oz)   BMI 27.16 kg/m²   Estimated body mass index is 27.16 kg/m² as calculated from the following:    Height as of this encounter: 170.2 cm (67\").    Weight as of this encounter: 78.7 kg (173 lb 6.4 oz).            Physical Exam   Result Review :                Assessment and Plan   There are no diagnoses linked to this encounter.         Follow Up   No follow-ups on file.  Patient was given instructions and counseling regarding her condition or for health maintenance advice. Please see specific information pulled into the AVS if appropriate.     There are no Patient Instructions on file for this visit.       "

## 2025-05-31 DIAGNOSIS — M51.362 DEGENERATION OF INTERVERTEBRAL DISC OF LUMBAR REGION WITH DISCOGENIC BACK PAIN AND LOWER EXTREMITY PAIN: ICD-10-CM

## 2025-06-03 ENCOUNTER — HOSPITAL ENCOUNTER (OUTPATIENT)
Dept: GENERAL RADIOLOGY | Facility: SURGERY CENTER | Age: 65
End: 2025-06-03
Payer: COMMERCIAL

## 2025-06-03 ENCOUNTER — HOSPITAL ENCOUNTER (OUTPATIENT)
Facility: SURGERY CENTER | Age: 65
Setting detail: HOSPITAL OUTPATIENT SURGERY
Discharge: HOME | End: 2025-06-03
Attending: ANESTHESIOLOGY | Admitting: ANESTHESIOLOGY
Payer: COMMERCIAL

## 2025-06-03 VITALS
OXYGEN SATURATION: 97 % | TEMPERATURE: 97.2 F | DIASTOLIC BLOOD PRESSURE: 58 MMHG | RESPIRATION RATE: 16 BRPM | HEART RATE: 76 BPM | SYSTOLIC BLOOD PRESSURE: 109 MMHG

## 2025-06-03 DIAGNOSIS — M54.50 CHRONIC LOW BACK PAIN, UNSPECIFIED BACK PAIN LATERALITY, UNSPECIFIED WHETHER SCIATICA PRESENT: ICD-10-CM

## 2025-06-03 DIAGNOSIS — G89.29 CHRONIC LOW BACK PAIN, UNSPECIFIED BACK PAIN LATERALITY, UNSPECIFIED WHETHER SCIATICA PRESENT: ICD-10-CM

## 2025-06-03 DIAGNOSIS — M47.816 LUMBAR FACET ARTHROPATHY: ICD-10-CM

## 2025-06-03 DIAGNOSIS — Z41.9 SURGERY, ELECTIVE: ICD-10-CM

## 2025-06-03 DIAGNOSIS — M51.362 DEGENERATION OF INTERVERTEBRAL DISC OF LUMBAR REGION WITH DISCOGENIC BACK PAIN AND LOWER EXTREMITY PAIN: ICD-10-CM

## 2025-06-03 LAB — GLUCOSE BLDC GLUCOMTR-MCNC: 179 MG/DL (ref 70–130)

## 2025-06-03 PROCEDURE — 64635 DESTROY LUMB/SAC FACET JNT: CPT | Performed by: ANESTHESIOLOGY

## 2025-06-03 PROCEDURE — 76000 FLUOROSCOPY <1 HR PHYS/QHP: CPT

## 2025-06-03 PROCEDURE — 64636 DESTROY L/S FACET JNT ADDL: CPT | Performed by: ANESTHESIOLOGY

## 2025-06-03 PROCEDURE — 25010000002 BUPIVACAINE (PF) 0.5 % SOLUTION 10 ML VIAL: Performed by: ANESTHESIOLOGY

## 2025-06-03 PROCEDURE — S0260 H&P FOR SURGERY: HCPCS | Performed by: ANESTHESIOLOGY

## 2025-06-03 PROCEDURE — 25010000002 LIDOCAINE PF 2% 2 % SOLUTION 5 ML VIAL: Performed by: ANESTHESIOLOGY

## 2025-06-03 PROCEDURE — 25010000002 LIDOCAINE PF 1% 1 % SOLUTION 30 ML VIAL: Performed by: ANESTHESIOLOGY

## 2025-06-03 RX ORDER — LIDOCAINE 50 MG/G
1 PATCH TOPICAL EVERY 24 HOURS
Qty: 30 PATCH | Refills: 2 | Status: SHIPPED | OUTPATIENT
Start: 2025-06-03

## 2025-06-03 NOTE — OP NOTE
Bilateral L3-5 Lumbar Medial Branch RADIOFREQUENCY  [facility]      PREOPERATIVE DIAGNOSIS:  Lumbar spondylosis without myelopathy    POSTOPERATIVE DIAGNOSIS:  Lumbar spondylosis without myelopathy    PROCEDURE:   Diagnostic Bilateral Lumbar Medial Branch Nerve thermal radiofrequency lesioning, with fluoroscopy:  L3, L4, and L5 nerves (at the L4 and L5 transverse processes and the sacral alar groove) to thermally treat the innervation to facet joints L4-5 and L5-S1  [id number] -- Bilateral L/S facet neuro destr., 1st Level  [id number] -- Bilateral L/S facet neuro destr., 2nd  Level    PRE-PROCEDURE DISCUSSION WITH PATIENT:    Risks and complications were discussed with the patient prior to starting the procedure and informed consent was obtained.      SURGEON:  Raul Moreira MD    REASON FOR PROCEDURE:    Previous diagnostic positivity of two Lumbar Medial Branch Blockades at the same levels    SEDATION:  Patient declined administration of moderate sedation        ANESTHETIC:  Lidocaine 2%  STEROID:  NONE      DESCRIPTON OF PROCEDURE:  After obtaining informed consent, IV access  was not obtained in the preoperative area.   The patient was taken to the operating room.  The patient was placed in the prone position with a pillow under the abdomen. All pressure points were well padded.  EKG, blood pressure, and pulse oximeter were monitored.  The patient was monitored and sedated by the RN under my direction. The lumbosacral area was prepped with Chloraprep and draped in a sterile fashion.     Under fluoroscopic guidance the transverse processes of the L4 and L5 vertebrae at the junctions of the superior articular processes were identified on the right.  Also identified was the groove between the ala and the superior articular process of the sacrum on the ipsilateral side.  Skin and subcutaneous tissue were anesthetized with 1ml of 1% lidocaine above each of these points. Then, radiofrequency probe  needles were advanced in this fluoro view to the above junctions.  Aspiration was negative for blood and CSF.  After confirming the position of the needle with fluoroscope in all views, testing was initiated.  First, sensory testing was started on each needle a 1V and 50Hz and slowly decreased until painful pressure stimulation diminished at 0.5V.  Next, motor testing was confirmed to be negative at 3V and 2Hz for any radicular stimulation.  Then 1mL of the local anesthetic was instilled in each needle.  Two minutes elapsed, and during this time a lateral fluoroscopic view was confirmed again to ensure the needles had not advanced nor retracted.  Then, Radiofrequency Lesioning was initiated for 2 minutes at 82 degrees Celsius.  Needles were removed intact from each of the areas.     A similar procedure was repeated to address the L3, L4, and L5 nerves on the contralateral side.   Onset of analgesia was noted.  Vital signs remained stable throughout.      ESTIMATED BLOOD LOSS:  <5 mL  SPECIMENS:  none    COMPLICATIONS:   No complications were noted. and The patient did not have any signs of postprocedure numbness nor weakness.    TOLERANCE & DISCHARGE CONDITION:    The patient tolerated the procedure well.  The patient was transported to the recovery area without difficulties.  The patient was discharged to home under the care of family in stable and satisfactory condition.    PLAN OF CARE:  The patient was given our standard instruction sheet.  The patient will  Return to clinic 6 wks.  The patient will resume all medications as per the medication reconciliation sheet.

## 2025-06-03 NOTE — DISCHARGE INSTRUCTIONS
[facility] - Post-procedure Instructions          --  While there are no absolute restrictions, it is recommended that you do not perform strenuous activity today. In the morning, you may resume your level of activity as before your block.    --  If you have a band-aid at your injection site, please remove it later today. Observe the area for any redness, swelling, pus-like drainage, or a temperature over 101°. If any of these symptoms occur, please call your doctor at [phone]. If after office hours, leave a message and the on-call provider will return your call.    --  Ice may be applied to your injection site. It is recommended you avoid direct heat (heating pad; hot tub) for 1-2 days.    --  Call [facility] at [phone] if you experience persistent headache, persistent bleeding from the injection site, or severe pain not relieved by heat or oral medication.    --  Do not make important decisions today.    --  Due to the effects of the block and/or the I.V. Sedation, DO NOT drive or operate hazardous machinery for 12 hours.  Local anesthetics may cause numbness after procedure and precautions must be taken with regards to operating equipment as well as with walking, even if ambulating with assistance of another person or with an assistive device.    --  Do not drink alcohol for 12 hours.    -- You may return to work tomorrow, or as directed by your referring doctor.    --  Occasionally you may notice a slight increase in your pain after the procedure. This should start to improve within the next 24-48 hours. Radiofrequency ablation procedure pain may last 3-4 weeks.    --  It may take as long as 3-4 days before you notice a gradual improvement in your pain and/or other symptoms.    -- You may continue to take your prescribed pain medication as needed.    --  Some normal possible side effects of steroid use could include fluid retention, increased blood sugar, dull headache,  increased sweating, increased appetite, mood swings and flushing.    --  Diabetics are recommended to watch their blood glucose level closely for 24-48 hours after the injection.    --  Must stay in PACU for 20 min upon arrival and prove no leg weakness before being discharged.    --  IN THE EVENT OF A LIFE THREATENING EMERGENCY, (CHEST PAIN, BREATHING DIFFICULTIES, PARALYSIS…) YOU SHOULD GO TO YOUR NEAREST EMERGENCY ROOM.    --  You should be contacted by our office within 2-3 days to schedule follow up or next appointment date.  If not contacted within 7 days, please call the office at [phone]

## 2025-06-03 NOTE — H&P
Brief Pre-procedural / Pre-operative H&P        -----    Pertinent Diagnosis:   Lumbar facet arthropathy    Proposed Procedure: Lumbar radiofrequency ablation anticipated bilaterally at the L3 and L4 and L5 medial branches      Subjective   Ro Perez is a 65 y.o. female  who presents for intervention.  She has a history of back pain.      History of Present Illness     She had diagnostic positive medial branch blocks x 2 at the aforementioned levels    Radiofrequency ablation was delayed and she recently was treating a skin infection but reports that it resolved    She reported 90-95% diagnostic relief on both of the medial branch blocks    Intermittent aching and burning and some shooting areas of pain.  Axial back pain increases with walking and prolonged standing and rising from a sitting position prolonged sitting and twisting and bending and she does have some noted facet hypertrophic change on lumbar MRI from about 2 years ago    She displays facet tenderness and positive facet loading on exam    -------    The following portions of the patient's history were reviewed and updated as appropriate: allergies, current medications, past family history, past medical history, past social history, past surgical history and problem list.    Allergies   Allergen Reactions    Codeine Hives    Sulfa Antibiotics Rash       No current facility-administered medications for this encounter.    No current facility-administered medications on file prior to encounter.     Current Outpatient Medications on File Prior to Encounter   Medication Sig Dispense Refill    atorvastatin (LIPITOR) 20 MG tablet Take 1 tablet by mouth Daily. To decrease risk of stroke and heart attack 360 tablet 3    DULoxetine (CYMBALTA) 60 MG capsule Take 1 capsule by mouth 2 (Two) Times a Day. 180 capsule 3    fenofibrate (TRICOR) 145 MG tablet Take 0.5 tablets by mouth Daily. 45 tablet 3    finasteride (PROSCAR) 5 MG tablet Take 1 tablet by mouth  Daily.      metFORMIN ER (GLUCOPHAGE-XR) 500 MG 24 hr tablet Take 4 tablets by mouth Daily With Breakfast. For prediabetes in good health 360 tablet 3    mirtazapine (REMERON) 15 MG tablet Take 1 tablet by mouth every night at bedtime. 90 tablet 3    Multiple Vitamins-Minerals (CENTRUM SILVER 50+WOMEN PO) Take 1 tablet by mouth Daily.      pregabalin (LYRICA) 150 MG capsule Take 1 capsule by mouth 2 (Two) Times a Day. 60 capsule 2    albuterol sulfate  (90 Base) MCG/ACT inhaler INHALE 2 PUFFS BY MOUTH EVERY 4 HOURS AS NEEDED FOR WHEEZE 6.7 g 3    ALPRAZolam (Xanax) 0.5 MG tablet One half to one tablet daily as needed for anxiety, caution sedation do not drive. 45 tablet 0    Ibuprofen 3 %, Gabapentin 10 %, Baclofen 2 %, lidocaine 4 %, Ketamine HCl 4 % Apply 1-2 g topically to the appropriate area as directed 3 (Three) to 4 (Four) times daily. 90 g 0    Phentermine-Topiramate 11.25-69 MG capsule sustained-release 24 hr Take 1 each by mouth Daily. (Patient not taking: Reported on 5/12/2025) 90 capsule 0    [DISCONTINUED] lidocaine (LIDODERM) 5 % Place 1 patch on the skin as directed by provider Daily. Remove & Discard patch within 12 hours or as directed by MD 30 patch 2         Chronic low back pain    Degeneration of intervertebral disc of lumbar region with discogenic back pain and lower extremity pain    Lumbar facet arthropathy       Past Medical History:   Diagnosis Date    Abnormal finding on mammography 05/05/2016    Anemia     Anxiety     Benign breast lumps     Bloating     Bursitis of shoulder, right 07/2019    Cervical disc disorder     Chronic left-sided low back pain without sciatica     Chronic right shoulder pain     Colon polyps     FOLLOWED BY DR. CATE DIAZ (dyspnea on exertion) 04/2018    Extremity pain     Fatigue     Fractures 2014    Left Ankle    Headache 1990    Headache, tension-type 1990    Health maintenance examination 04/07/2017    High risk medication use  2018    Hyperlipidemia     MIXED    Insomnia     Joint pain     Knees    Kidney stones     Lumbar radiculopathy 2023    Migraine     OA (osteoarthritis)     Osteopenia     Postmenopausal     Skin tag of anus     Urgency incontinence 2016    Vitamin D deficiency     Weakness of shoulder     RIGHT SHOULDER    Weight gain        Past Surgical History:   Procedure Laterality Date     SECTION N/A 1997     SECTION N/A 2000    COLONOSCOPY N/A 2020    3 CM LESION IN PERIANAL-SUSPECTED CONDYLOMA, MULTIPLE MEDIUM DIVERTICULA IN SIGMOID, 3 HYPERPLASTIC POLYPS IN RECTUM, MEDIUM INTERNAL HEMORRHOIDS, DR. CATE LANG AT [facility]      EPIDURAL Left 2024    Procedure: LEFT L4 & L5 TRANSFORAMINAL LUMABR EPIDURAL STEROID INJECTION CPT: 93479, 02752;  Surgeon: Raul Moreira MD;  Location: [facility];  Service: Pain Management;  Laterality: Left;    EPIDURAL Left 2024    Procedure: LEFT L3 & L4 TRANSFORAMINAL LUMBAR EPIDURAL STEROID INJECTION CPT: 03247, 41185;  Surgeon: Raul Moreira MD;  Location: [facility];  Service: Pain Management;  Laterality: Left;    EPIDURAL Bilateral 2025    Procedure: BILATEAL L4 TRANSFORAMINAL LUMBAR EPIDURAL STEROID INJECTION CPT: 19620;  Surgeon: Raul Moreira MD;  Location: [facility];  Service: Pain Management;  Laterality: Bilateral;    EPIDURAL BLOCK      EYE SURGERY  2005    Lasik    HEMORRHOIDECTOMY N/A 2020    Procedure: RIGHT POSTERIOR ANAL TAG EXCISION;  Surgeon: Tessa Ball MD;  Location: [facility] MA[facility] OR;  Service: General;  Laterality: N/A;    LASER ABLATION  2002    LASIK Bilateral     LUMBAR EPIDURAL INJECTION N/A 2023    Procedure: L3-L4 LUMBAR EPIDURAL STEROID INJECTION CPT:22706;  Surgeon: Raul Moreira MD;  Location: [facility];  Service: Pain Management;  Laterality: N/A;    LUMBAR EPIDURAL INJECTION N/A 2024    Procedure: L3-L4 LUMBAR EPIDURAL STEROID INJECTION  CPT: 63815;  Surgeon: Raul Moreira MD;  Location: [facility];  Service: Pain Management;  Laterality: N/A;    LUMBAR EPIDURAL INJECTION N/A 2024    Procedure: L3/L4 LUMBAR EPIDURAL STEROID INJECTION CPT: 66918;  Surgeon: Raul Moreira MD;  Location: [facility];  Service: Pain Management;  Laterality: N/A;    MEDIAL BRANCH BLOCK Bilateral 3/25/2025    Procedure: BILATERAL L3-L5 LUMBAR MEDIAL BRANCH BLOCK CPT: 80098, 49984;  Surgeon: Raul Moreira MD;  Location: [facility];  Service: Pain Management;  Laterality: Bilateral;    MEDIAL BRANCH BLOCK Bilateral 2025    Procedure: BILATERAL L3-L5 LUMBAR MEDIAL BRANCH BLOCK CPT: 42494, 58435;  Surgeon: Raul Moreira MD;  Location: [facility];  Service: Pain Management;  Laterality: Bilateral;       Family History   Problem Relation Age of Onset    Osteoporosis Mother     Cancer Mother          2023    Colon polyps Mother     Breast cancer Mother     Stroke Mother          2023    Arthritis Mother     Osteoporosis Maternal Grandmother     No Known Problems Father     No Known Problems Sister     No Known Problems Daughter     No Known Problems Daughter     Malig Hyperthermia Neg Hx        Social History     Socioeconomic History    Marital status:    Tobacco Use    Smoking status: Every Day     Current packs/day: 0.50     Average packs/day: 0.5 packs/day for 20.0 years (10.0 ttl pk-yrs)     Types: Cigarettes     Passive exposure: Never    Smokeless tobacco: Never    Tobacco comments:     last smoke 20   Vaping Use    Vaping status: Never Used   Substance and Sexual Activity    Alcohol use: Never    Drug use: Never    Sexual activity: Not Currently     Partners: Male     Birth control/protection: Post-menopausal     Comment: .       -------       Review of Systems  No Fever, No Chills, No ear pain, No sinus pressure or drainage, No eye pain or drainage, No cough, No SOB, No chest  tightness nor chest pain, no palpitations.          Vitals:    06/03/25 0903   BP: 127/63   BP Location: Left arm   Patient Position: Sitting   Pulse: 82   Resp: 16   Temp: 97.8 °F (36.6 °C)   TempSrc: Temporal   SpO2: 99%         Objective   Physical Exam  VSS, NNR, NCAT, NMNA, NRD, AAOx3.      -------    Assessment & Plan:  - as noted above, the stated intervention is indicated  - Follow-up plan will be noted in the operative report        Follow-up scheduled 6 weeks, July 14      EMR Dragon/Transcription disclaimer:   Typed items in this encounter note may have been created by electronic transcription/translation software which converts spoken language to printed text. The electronic translation of spoken language may permit erroneous, or at times, nonsensical words or phrases to be inadvertently transcribed; Although I have reviewed the note for such errors, some may still exist.

## 2025-06-17 ENCOUNTER — TRANSCRIBE ORDERS (OUTPATIENT)
Dept: FAMILY MEDICINE CLINIC | Facility: CLINIC | Age: 65
End: 2025-06-17
Payer: COMMERCIAL

## 2025-06-17 DIAGNOSIS — Z12.31 ENCOUNTER FOR SCREENING MAMMOGRAM FOR BREAST CANCER: Primary | ICD-10-CM

## 2025-07-02 ENCOUNTER — TELEPHONE (OUTPATIENT)
Dept: GASTROENTEROLOGY | Facility: CLINIC | Age: 65
End: 2025-07-02
Payer: COMMERCIAL

## 2025-07-02 NOTE — TELEPHONE ENCOUNTER
Last colonoscopy 7/20/20    Personal hx polyps  Unknown family hx polyps  No family hx cx    Asa or blood thinners:  None    List medications:  Duloxetine  Mirtazapine  Phentermine  Fenofibrate  Alprazolam  Albuterol  Metformin  Atorvastatin  Finasteride  Pregabalin  Acetaminophen  Multi vitamin    OA form scanned in media

## 2025-07-03 ENCOUNTER — PREP FOR SURGERY (OUTPATIENT)
Dept: OTHER | Facility: HOSPITAL | Age: 65
End: 2025-07-03
Payer: COMMERCIAL

## 2025-07-03 DIAGNOSIS — Z12.11 ENCOUNTER FOR SCREENING FOR MALIGNANT NEOPLASM OF COLON: Primary | ICD-10-CM

## 2025-07-08 ENCOUNTER — TELEPHONE (OUTPATIENT)
Dept: GASTROENTEROLOGY | Facility: CLINIC | Age: 65
End: 2025-07-08
Payer: COMMERCIAL

## 2025-07-08 NOTE — TELEPHONE ENCOUNTER
HERNANDEZ tellez  for COLONOSCOPY on  09/30 arrive at  1030am  . mailed prep instructions to verified address on file....miralax OK FOR THE HUB TO RELAY

## 2025-07-14 ENCOUNTER — OFFICE VISIT (OUTPATIENT)
Dept: PAIN MEDICINE | Facility: CLINIC | Age: 65
End: 2025-07-14
Payer: COMMERCIAL

## 2025-07-14 ENCOUNTER — PREP FOR SURGERY (OUTPATIENT)
Dept: SURGERY | Facility: SURGERY CENTER | Age: 65
End: 2025-07-14
Payer: COMMERCIAL

## 2025-07-14 ENCOUNTER — TRANSCRIBE ORDERS (OUTPATIENT)
Dept: SURGERY | Facility: SURGERY CENTER | Age: 65
End: 2025-07-14
Payer: COMMERCIAL

## 2025-07-14 VITALS
HEART RATE: 91 BPM | WEIGHT: 177 LBS | DIASTOLIC BLOOD PRESSURE: 79 MMHG | BODY MASS INDEX: 27.78 KG/M2 | TEMPERATURE: 97.6 F | OXYGEN SATURATION: 94 % | SYSTOLIC BLOOD PRESSURE: 106 MMHG | HEIGHT: 67 IN | RESPIRATION RATE: 16 BRPM

## 2025-07-14 DIAGNOSIS — G89.29 CHRONIC RIGHT SHOULDER PAIN: ICD-10-CM

## 2025-07-14 DIAGNOSIS — M51.362 DEGENERATION OF INTERVERTEBRAL DISC OF LUMBAR REGION WITH DISCOGENIC BACK PAIN AND LOWER EXTREMITY PAIN: ICD-10-CM

## 2025-07-14 DIAGNOSIS — Z41.9 SURGERY, ELECTIVE: Primary | ICD-10-CM

## 2025-07-14 DIAGNOSIS — M25.511 CHRONIC RIGHT SHOULDER PAIN: ICD-10-CM

## 2025-07-14 DIAGNOSIS — M51.362 DEGENERATION OF INTERVERTEBRAL DISC OF LUMBAR REGION WITH DISCOGENIC BACK PAIN AND LOWER EXTREMITY PAIN: Primary | ICD-10-CM

## 2025-07-14 DIAGNOSIS — M54.16 LUMBAR RADICULOPATHY: ICD-10-CM

## 2025-07-14 DIAGNOSIS — M54.16 LUMBAR RADICULOPATHY: Primary | ICD-10-CM

## 2025-07-14 DIAGNOSIS — M47.816 LUMBAR FACET ARTHROPATHY: ICD-10-CM

## 2025-07-14 PROCEDURE — 99214 OFFICE O/P EST MOD 30 MIN: CPT

## 2025-07-14 NOTE — PROGRESS NOTES
CHIEF COMPLAINT  Back pain    Subjective   Mehnazrosetta Perez is a 65 y.o. female  who presents to the office for follow-up of procedure.  She completed a Bilateral L3-L5 RFA on 6/3/25 performed by Dr. Moreira for management of back pain. Patient reports 0% relief from the procedure.     Today pain is 7/10VAS in severity (severity in pain varies based on activity level). She continues to complain of axial low back pain that radiates across her low back in a bandlike pattern. Pain will radiate into left hip and left lateral/anterior thigh. Describes this pain as an intermittent aching, burning, and shooting. Pain is worsened by walking long distances, prolonged sitting or standing, rising from sitting to standing position, and bending/twisting. Pain improve with rest/reposition, and heat.  Mixed results with PT in the past.      Continue with Pregabalin 150mg at night (unable to tolerate during the day due to drowsiness) and compound pain cream. Gabapentin caused dizziness. She continues with OTC Tylenol or Ibuprofen and Cymbalta 60mg BID (prescribed by PCP).     Patient works at [employer].    She complains today of worsening right shoulder pain. History of steroid injection 6 years ago that offered long term relief. She is requesting another injection at this time.      Procedures:  6/3/25 - Bilateral L3-L5 RFA - no relief  4/22/25 - Bilateral L3-L5 MBB - 90% relief x 8 hours  3/25/25 - Bilateral L3-L5 MBB - 95% relief x 8 hours  2/4/25 - Bilateral L4 TF LESI - no relief   11/20/24 - Left L3 & L4 TF LESI - 50% relief for 3 months   8/6/24 - Left L3 & L4 TF LESI - 50% relief x 3 months  6/12/24 - L3-L4 LESI - 20% ongoing relief   2/20/24 - L3-L4 LESI - 100% x 2.5 months  11/1/23 - L3-L4 LESI - 100% relief x 2.5 months    Back Pain  Chronicity:  Chronic  Onset:  More than 1 year ago  Frequency:  Intermittently  Progression since onset:  Unchanged  Pain location:  Lumbar spine  Pain quality:  Shooting, aching and burning  Radiates  "to:  Does not radiate (left anterior thigh and left lateral leg into ankle)  Pain-numeric:  7/10 (severity in pain varies based on activity level)  Pain severity:  Moderate  Pain is:  The same all the time  Aggravated by:  Position, sitting and standing (walking long distances,)  Associated symptoms: numbness (left leg)    Associated symptoms: no abdominal pain, no chest pain, no dysuria, no fever, no headaches and no weakness    Treatments tried:  Chiropractic manipulation, heat and NSAIDs (PT,)  Improvement on treatment:  Mild    PEG Assessment   What number best describes your pain on average in the past week?8  What number best describes how, during the past week, pain has interfered with your enjoyment of life?8  What number best describes how, during the past week, pain has interfered with your general activity?  8    Review of Pertinent Medical Data ---      The following portions of the patient's history were reviewed and updated as appropriate: allergies, current medications, past family history, past medical history, past social history, past surgical history, and problem list.    Review of Systems   Constitutional:  Positive for activity change (less). Negative for fever.   Cardiovascular:  Negative for chest pain.   Gastrointestinal:  Negative for abdominal pain, constipation and diarrhea.   Genitourinary:  Negative for difficulty urinating and dysuria.   Musculoskeletal:  Positive for back pain.   Neurological:  Positive for numbness (left leg). Negative for weakness and headaches.   Psychiatric/Behavioral:  Negative for self-injury, sleep disturbance and suicidal ideas.      I have reviewed and confirmed the accuracy of the ROS as documented by the MA/LPN/VINH Spaulding     Vitals:    07/14/25 0912   BP: 106/79   Pulse: 91   Resp: 16   Temp: 97.6 °F (36.4 °C)   SpO2: 94%   Weight: 80.3 kg (177 lb)   Height: 170.2 cm (67\")   PainSc: 7      Objective   Physical Exam  Constitutional:       " Appearance: Normal appearance.   HENT:      Head: Normocephalic.   Cardiovascular:      Rate and Rhythm: Normal rate and regular rhythm.   Pulmonary:      Effort: Pulmonary effort is normal.      Breath sounds: Normal breath sounds.   Musculoskeletal:      Right shoulder: Bony tenderness present. Decreased range of motion.      Cervical back: Normal range of motion.      Lumbar back: Tenderness present. Decreased range of motion. Positive left straight leg raise test. Negative right straight leg raise test.      Left hip: Tenderness present.      Comments: + lumbar facet loading/tenderness   Skin:     General: Skin is warm and dry.      Capillary Refill: Capillary refill takes less than 2 seconds.   Neurological:      General: No focal deficit present.      Mental Status: She is alert and oriented to person, place, and time.   Psychiatric:         Mood and Affect: Mood normal.         Behavior: Behavior normal.         Thought Content: Thought content normal.         Cognition and Memory: Cognition normal.       Assessment & Plan   Diagnoses and all orders for this visit:    1. Degeneration of intervertebral disc of lumbar region with discogenic back pain and lower extremity pain (Primary)  -     Ibuprofen 3 %, Gabapentin 10 %, Baclofen 2 %, lidocaine 4 %, Ketamine HCl 4 %; Apply 1-2 g topically to the appropriate area as directed 3 (Three) to 4 (Four) times daily.  Dispense: 90 g; Refill: 0    2. Lumbar radiculopathy    3. Lumbar facet arthropathy  -     Ibuprofen 3 %, Gabapentin 10 %, Baclofen 2 %, lidocaine 4 %, Ketamine HCl 4 %; Apply 1-2 g topically to the appropriate area as directed 3 (Three) to 4 (Four) times daily.  Dispense: 90 g; Refill: 0    4. Chronic right shoulder pain  -     Large Joint Arthrocentesis      Mehnaz Chris reports a pain score of 7.  Given her pain assessment as noted, treatment options were discussed and the following options were decided upon as a follow-up plan to address the  patient's pain: continuation of current treatment plan for pain and steroid injections.    --- Left L3 & L4 TF LESI  ---  Indications for epidural injection:  Plan is to proceed with epidural at the appropriate level.  If the patient receives significant pain reduction and improvement in function and the plan will be to repeat the epidural when the pain worsens.  If a second epidural provides at least 6 weeks of sustained improvement that includes both pain reduction and improvement in function then an epidural injection could be repeated once again at the same level.  This is a mutual decision between the clinician and the patient that includes discussions including risks and benefits in detail as well as alternative therapies.  Patient's questions were answered to their satisfaction and to their understanding.  ---   --- The urine drug screen confirmation from 3/3/25 has been reviewed and the result is appropriate based on patient history and MANJULA report   --- Continue Pregabalin. No refill needed at this time.   --- Continue compound pain cream. Refill sent to [facility].   --- If no relief from procedure, may consider neurosurgery referral. Brief discussion on Neuromodulation/SCS therapy.   --- Right shoulder injection - in office procedure   --- Follow-up for procedure      MANJULA REPORT  As part of the patient's treatment plan, I am prescribing controlled substances. The patient has been made aware of appropriate use of such medications, including potential risk of somnolence, limited ability to drive and/or work safely, and the potential for dependence or overdose. It has also been made clear that these medications are for use by this patient only, without concomitant use of alcohol or other substances unless prescribed.     Patient has completed prescribing agreement detailing terms of continued prescribing of controlled substances, including monitoring MANJULA reports, urine drug screening, and pill  counts if necessary. The patient is aware that inappropriate use will results in cessation of prescribing such medications.    As the clinician, I personally reviewed the MANJULA from 7/14/25 while the patient was in the office today.    History and physical exam exhibit continued safe and appropriate use of controlled substances.    Dictated utilizing Dragon dictation.

## 2025-07-28 ENCOUNTER — PROCEDURE VISIT (OUTPATIENT)
Dept: PAIN MEDICINE | Facility: CLINIC | Age: 65
End: 2025-07-28
Payer: COMMERCIAL

## 2025-07-28 VITALS
TEMPERATURE: 97.1 F | RESPIRATION RATE: 18 BRPM | HEART RATE: 91 BPM | HEIGHT: 67 IN | OXYGEN SATURATION: 94 % | BODY MASS INDEX: 27.59 KG/M2 | DIASTOLIC BLOOD PRESSURE: 77 MMHG | WEIGHT: 175.8 LBS | SYSTOLIC BLOOD PRESSURE: 124 MMHG

## 2025-07-28 DIAGNOSIS — G89.29 CHRONIC RIGHT SHOULDER PAIN: Primary | ICD-10-CM

## 2025-07-28 DIAGNOSIS — M25.511 CHRONIC RIGHT SHOULDER PAIN: Primary | ICD-10-CM

## 2025-07-28 PROCEDURE — 20610 DRAIN/INJ JOINT/BURSA W/O US: CPT

## 2025-07-28 RX ORDER — LIDOCAINE HYDROCHLORIDE 10 MG/ML
5 INJECTION, SOLUTION INFILTRATION; PERINEURAL ONCE
Status: COMPLETED | OUTPATIENT
Start: 2025-07-28 | End: 2025-07-28

## 2025-07-28 RX ORDER — METHYLPREDNISOLONE ACETATE 40 MG/ML
40 INJECTION, SUSPENSION INTRA-ARTICULAR; INTRALESIONAL; INTRAMUSCULAR; SOFT TISSUE ONCE
Status: COMPLETED | OUTPATIENT
Start: 2025-07-28 | End: 2025-07-28

## 2025-07-28 RX ADMIN — METHYLPREDNISOLONE ACETATE 40 MG: 40 INJECTION, SUSPENSION INTRA-ARTICULAR; INTRALESIONAL; INTRAMUSCULAR; SOFT TISSUE at 10:26

## 2025-07-28 RX ADMIN — LIDOCAINE HYDROCHLORIDE 5 ML: 10 INJECTION, SOLUTION INFILTRATION; PERINEURAL at 10:30

## 2025-07-28 NOTE — PROGRESS NOTES
"CHIEF COMPLAINT: Shoulder Pain    Ro Perez is a 65 y.o. female.  She is here for the following procedure:  right shoulder injection    Vitals:    07/28/25 1014   BP: 124/77   BP Location: Left arm   Patient Position: Sitting   Cuff Size: Adult   Pulse: 91   Resp: 18   Temp: 97.1 °F (36.2 °C)   TempSrc: Temporal   SpO2: 94%   Weight: 79.7 kg (175 lb 12.8 oz)   Height: 170.2 cm (67\")   PainSc: 4      Lidocaine 1%  Lot#: [id number] Exp:   NDC: 2770636654  Depo Medrol 40 mg/ml Lot#: [id number] Exp: 12-  NDC: 0765576993     + Large Joint Arthrocentesis: R subacromial bursa (Lidocaine 1%  Lot#: [id number] Exp:   NDC: 7478312286  Depo Medrol 40 mg/ml Lot#: [id number] Exp: 12-  NDC: 5424658578) on 7/28/2025 11:03 AM  Indications: pain and joint swelling  Details: 25 G needle, lateral approach  Aspirate: 0 mL (None  )  Outcome: tolerated well, no immediate complications  Procedure, treatment alternatives, risks and benefits explained, specific risks discussed. Consent was given by the patient. Immediately prior to procedure a time out was called to verify the correct patient, procedure, equipment, support staff and site/side marked as required. Patient was prepped and draped in the usual sterile fashion.         Visit Diagnoses:  1. Chronic right shoulder pain        VINH Phillips  Pain Management  "

## 2025-07-30 RX ORDER — DULOXETIN HYDROCHLORIDE 60 MG/1
60 CAPSULE, DELAYED RELEASE ORAL 2 TIMES DAILY
Qty: 180 CAPSULE | Refills: 1 | Status: SHIPPED | OUTPATIENT
Start: 2025-07-30

## 2025-07-30 NOTE — TELEPHONE ENCOUNTER
Rx Refill Note  Requested Prescriptions     Pending Prescriptions Disp Refills    DULoxetine (CYMBALTA) 60 MG capsule [Pharmacy Med Name: DULOXETINE HCL DR 60 MG CAP] 180 capsule 3     Sig: TAKE 1 CAPSULE BY MOUTH TWICE A DAY      Last office visit with prescribing clinician: 5/12/2025   Last telemedicine visit with prescribing clinician: Visit date not found   Next office visit with prescribing clinician: 8/4/2025                         Would you like a call back once the refill request has been completed: [] Yes [] No    If the office needs to give you a call back, can they leave a voicemail: [] Yes [] No    Irasema Emery MA  07/30/25, 08:15 EDT

## 2025-07-31 RX ORDER — MIRTAZAPINE 15 MG/1
15 TABLET, FILM COATED ORAL
Qty: 90 TABLET | Refills: 3 | Status: SHIPPED | OUTPATIENT
Start: 2025-07-31

## 2025-07-31 NOTE — TELEPHONE ENCOUNTER
Rx Refill Note  Requested Prescriptions     Pending Prescriptions Disp Refills    mirtazapine (REMERON) 15 MG tablet [Pharmacy Med Name: MIRTAZAPINE 15 MG TABLET] 90 tablet 3     Sig: TAKE 1 TABLET BY MOUTH EVERYDAY AT BEDTIME      Last office visit with prescribing clinician: 5/12/2025   Last telemedicine visit with prescribing clinician: Visit date not found   Next office visit with prescribing clinician: 8/4/2025                         Would you like a call back once the refill request has been completed: [] Yes [] No    If the office needs to give you a call back, can they leave a voicemail: [] Yes [] No    Leslie Lott MA  07/31/25, 08:36 EDT

## 2025-08-04 ENCOUNTER — OFFICE VISIT (OUTPATIENT)
Dept: FAMILY MEDICINE CLINIC | Facility: CLINIC | Age: 65
End: 2025-08-04
Payer: COMMERCIAL

## 2025-08-04 VITALS
HEIGHT: 67 IN | DIASTOLIC BLOOD PRESSURE: 76 MMHG | OXYGEN SATURATION: 96 % | BODY MASS INDEX: 28.02 KG/M2 | SYSTOLIC BLOOD PRESSURE: 124 MMHG | HEART RATE: 84 BPM | WEIGHT: 178.5 LBS

## 2025-08-04 DIAGNOSIS — Z00.00 HEALTH MAINTENANCE EXAMINATION: ICD-10-CM

## 2025-08-04 DIAGNOSIS — Z12.11 ENCOUNTER FOR SCREENING FOR MALIGNANT NEOPLASM OF COLON: ICD-10-CM

## 2025-08-04 DIAGNOSIS — R92.30 DENSE BREAST: ICD-10-CM

## 2025-08-04 DIAGNOSIS — E66.09 CLASS 1 OBESITY DUE TO EXCESS CALORIES WITH SERIOUS COMORBIDITY AND BODY MASS INDEX (BMI) OF 34.0 TO 34.9 IN ADULT: Primary | ICD-10-CM

## 2025-08-04 DIAGNOSIS — Z12.31 ENCOUNTER FOR SCREENING MAMMOGRAM FOR BREAST CANCER: ICD-10-CM

## 2025-08-04 DIAGNOSIS — E66.811 CLASS 1 OBESITY DUE TO EXCESS CALORIES WITH SERIOUS COMORBIDITY AND BODY MASS INDEX (BMI) OF 34.0 TO 34.9 IN ADULT: Primary | ICD-10-CM

## 2025-08-04 PROCEDURE — 99397 PER PM REEVAL EST PAT 65+ YR: CPT | Performed by: NURSE PRACTITIONER

## 2025-08-04 RX ORDER — AMANTADINE HYDROCHLORIDE 100 MG/1
TABLET ORAL
COMMUNITY
Start: 2025-07-15

## 2025-08-04 RX ORDER — PHENTERMINE AND TOPIRAMATE EXTENDED-RELEASE 15; 92 MG/1; MG/1
1 CAPSULE ORAL DAILY
Qty: 90 CAPSULE | Refills: 0 | Status: SHIPPED | OUTPATIENT
Start: 2025-08-04

## 2025-08-05 ENCOUNTER — HOSPITAL ENCOUNTER (OUTPATIENT)
Facility: SURGERY CENTER | Age: 65
Setting detail: HOSPITAL OUTPATIENT SURGERY
Discharge: HOME | End: 2025-08-05
Attending: ANESTHESIOLOGY | Admitting: ANESTHESIOLOGY
Payer: COMMERCIAL

## 2025-08-05 ENCOUNTER — HOSPITAL ENCOUNTER (OUTPATIENT)
Dept: GENERAL RADIOLOGY | Facility: SURGERY CENTER | Age: 65
Setting detail: HOSPITAL OUTPATIENT SURGERY
End: 2025-08-05
Payer: COMMERCIAL

## 2025-08-05 VITALS
HEART RATE: 73 BPM | WEIGHT: 173 LBS | HEIGHT: 67 IN | DIASTOLIC BLOOD PRESSURE: 66 MMHG | BODY MASS INDEX: 27.15 KG/M2 | SYSTOLIC BLOOD PRESSURE: 116 MMHG | OXYGEN SATURATION: 95 % | TEMPERATURE: 98.2 F | RESPIRATION RATE: 18 BRPM

## 2025-08-05 DIAGNOSIS — M51.362 DEGENERATION OF INTERVERTEBRAL DISC OF LUMBAR REGION WITH DISCOGENIC BACK PAIN AND LOWER EXTREMITY PAIN: ICD-10-CM

## 2025-08-05 DIAGNOSIS — Z41.9 SURGERY, ELECTIVE: ICD-10-CM

## 2025-08-05 DIAGNOSIS — M54.16 LUMBAR RADICULOPATHY: ICD-10-CM

## 2025-08-05 PROCEDURE — 25010000002 METHYLPREDNISOLONE PER 40 MG: Performed by: ANESTHESIOLOGY

## 2025-08-05 PROCEDURE — 77002 NEEDLE LOCALIZATION BY XRAY: CPT

## 2025-08-05 PROCEDURE — 25010000002 LIDOCAINE PF 1% 1 % SOLUTION 5 ML VIAL: Performed by: ANESTHESIOLOGY

## 2025-08-05 PROCEDURE — 64484 NJX AA&/STRD TFRM EPI L/S EA: CPT | Performed by: ANESTHESIOLOGY

## 2025-08-05 PROCEDURE — 64483 NJX AA&/STRD TFRM EPI L/S 1: CPT | Performed by: ANESTHESIOLOGY

## 2025-08-05 PROCEDURE — 25010000002 BUPIVACAINE (PF) 0.5 % SOLUTION 10 ML VIAL: Performed by: ANESTHESIOLOGY

## 2025-08-05 PROCEDURE — 25510000001 IOPAMIDOL 61 % SOLUTION 30 ML VIAL: Performed by: ANESTHESIOLOGY

## 2025-08-11 ENCOUNTER — HOSPITAL ENCOUNTER (OUTPATIENT)
Dept: MAMMOGRAPHY | Facility: HOSPITAL | Age: 65
Discharge: HOME | End: 2025-08-11
Admitting: NURSE PRACTITIONER
Payer: COMMERCIAL

## 2025-08-11 DIAGNOSIS — Z12.31 ENCOUNTER FOR SCREENING MAMMOGRAM FOR BREAST CANCER: ICD-10-CM

## 2025-08-11 PROCEDURE — 77063 BREAST TOMOSYNTHESIS BI: CPT

## 2025-08-11 PROCEDURE — 77067 SCR MAMMO BI INCL CAD: CPT

## 2025-08-18 ENCOUNTER — HOSPITAL ENCOUNTER (OUTPATIENT)
Dept: ULTRASOUND IMAGING | Facility: HOSPITAL | Age: 65
Discharge: HOME | End: 2025-08-18
Admitting: NURSE PRACTITIONER
Payer: COMMERCIAL

## 2025-08-18 PROCEDURE — 76641 ULTRASOUND BREAST COMPLETE: CPT

## 2025-08-19 ENCOUNTER — RESULTS FOLLOW-UP (OUTPATIENT)
Dept: FAMILY MEDICINE CLINIC | Facility: CLINIC | Age: 65
End: 2025-08-19
Payer: COMMERCIAL

## 2025-08-19 DIAGNOSIS — R59.0 ENLARGED LYMPH NODES IN ARMPIT: Primary | ICD-10-CM

## 2025-08-19 DIAGNOSIS — R92.8 ABNORMAL MAMMOGRAM: ICD-10-CM

## 2025-08-26 RX ORDER — ALBUTEROL SULFATE 90 UG/1
2 INHALANT RESPIRATORY (INHALATION) EVERY 4 HOURS PRN
Qty: 6.7 G | Refills: 3 | Status: SHIPPED | OUTPATIENT
Start: 2025-08-26

## 2025-08-27 RX ORDER — METFORMIN HYDROCHLORIDE 500 MG/1
TABLET, EXTENDED RELEASE ORAL
Qty: 90 TABLET | Refills: 3 | Status: SHIPPED | OUTPATIENT
Start: 2025-08-27

## 2025-08-28 RX ORDER — FENOFIBRATE 145 MG/1
72.5 TABLET, FILM COATED ORAL DAILY
Qty: 45 TABLET | Refills: 1 | Status: SHIPPED | OUTPATIENT
Start: 2025-08-28

## (undated) DEVICE — GLV SURG TRIUMPH PF LTX 7.5 STRL

## (undated) DEVICE — SYR EPID PULSATOR LOR LL 7CC

## (undated) DEVICE — EPIDURAL TRAY: Brand: MEDLINE INDUSTRIES, INC.

## (undated) DEVICE — NDL SPINE 22G 5IN BLK

## (undated) DEVICE — ADAPT CLN BIOGUARD AIR/H2O DISP

## (undated) DEVICE — PREP SOL POVIDONE/IODINE BT 4OZ

## (undated) DEVICE — TRAP FLD MINIVAC MEGADYNE 100ML

## (undated) DEVICE — NDL SPINE 22G 31/2IN BLK

## (undated) DEVICE — TRY EPID CUST BHSCE

## (undated) DEVICE — GLV SURG SENSICARE PI LF PF 7.5 GRN STRL

## (undated) DEVICE — THE TORRENT IRRIGATION SCOPE CONNECTOR IS USED WITH THE TORRENT IRRIGATION TUBING TO PROVIDE IRRIGATION FLUIDS SUCH AS STERILE WATER DURING GASTROINTESTINAL ENDOSCOPIC PROCEDURES WHEN USED IN CONJUNCTION WITH AN IRRIGATION PUMP (OR ELECTROSURGICAL UNIT).: Brand: TORRENT

## (undated) DEVICE — NDL EPID TUOHY W/WINGS 20G 3.5IN

## (undated) DEVICE — Device: Brand: PORTEX

## (undated) DEVICE — NDL EPID TUOHY W/WINGS 20G 4.5IN

## (undated) DEVICE — GOWN SURG AERO CHROME XL

## (undated) DEVICE — TOWEL,OR,DSP,ST,BLUE,STD,4/PK,20PK/CS: Brand: MEDLINE

## (undated) DEVICE — PANTY KNIT WASHABLE LG/XL BRN/GRN LF

## (undated) DEVICE — Device

## (undated) DEVICE — KT ORCA ORCAPOD DISP STRL

## (undated) DEVICE — CANN O2 ETCO2 FITS ALL CONN CO2 SMPL A/ 7IN DISP LF

## (undated) DEVICE — SPNG LAP 18X18IN LF STRL PK/5

## (undated) DEVICE — SUT VIC 2/0 SH 27IN

## (undated) DEVICE — DRAPE,UTILITY,TAPE,15X26,STERILE: Brand: MEDLINE

## (undated) DEVICE — NDL HYPO ECLPS SFTY 22G 1 1/2IN

## (undated) DEVICE — SINGLE-USE BIOPSY FORCEPS: Brand: RADIAL JAW 4

## (undated) DEVICE — LOU MINOR PROCEDURE: Brand: MEDLINE INDUSTRIES, INC.

## (undated) DEVICE — TUBING, SUCTION, 1/4" X 10', STRAIGHT: Brand: MEDLINE

## (undated) DEVICE — SENSR O2 OXIMAX FNGR A/ 18IN NONSTR

## (undated) DEVICE — PENCL E/S ULTRAVAC TELESCP NOSE HOLSTR 10FT

## (undated) DEVICE — PAD GRND E/S NT200IX RF/GEN W/CABL DISP

## (undated) DEVICE — SPNG GZ WOVN 4X4IN 12PLY 10/BX STRL

## (undated) DEVICE — STERILE LATEX POWDER-FREE SURGICAL GLOVESWITH NITRILE COATING: Brand: PROTEXIS